# Patient Record
Sex: MALE | Race: WHITE | NOT HISPANIC OR LATINO | Employment: FULL TIME | URBAN - METROPOLITAN AREA
[De-identification: names, ages, dates, MRNs, and addresses within clinical notes are randomized per-mention and may not be internally consistent; named-entity substitution may affect disease eponyms.]

---

## 2017-01-11 ENCOUNTER — LAB VISIT (OUTPATIENT)
Dept: LAB | Facility: HOSPITAL | Age: 57
End: 2017-01-11
Attending: NURSE PRACTITIONER
Payer: COMMERCIAL

## 2017-01-11 DIAGNOSIS — E29.1 HYPOGONADISM IN MALE: ICD-10-CM

## 2017-01-11 PROCEDURE — 84270 ASSAY OF SEX HORMONE GLOBUL: CPT

## 2017-01-11 PROCEDURE — 36415 COLL VENOUS BLD VENIPUNCTURE: CPT

## 2017-01-14 LAB
ALBUMIN SERPL-MCNC: 4.3 G/DL (ref 3.6–5.1)
SHBG SERPL-SCNC: 31 NMOL/L (ref 22–77)
TESTOST FREE SERPL-MCNC: 36.6 PG/ML (ref 46–224)
TESTOST SERPL-MCNC: 273 NG/DL (ref 250–1100)
TESTOSTERONE.FREE+WB SERPL-MCNC: 72 NG/DL (ref 110–575)

## 2017-01-17 ENCOUNTER — OFFICE VISIT (OUTPATIENT)
Dept: UROLOGY | Facility: CLINIC | Age: 57
End: 2017-01-17
Payer: COMMERCIAL

## 2017-01-17 VITALS
HEART RATE: 67 BPM | BODY MASS INDEX: 26.51 KG/M2 | DIASTOLIC BLOOD PRESSURE: 71 MMHG | SYSTOLIC BLOOD PRESSURE: 115 MMHG | HEIGHT: 73 IN | WEIGHT: 200 LBS

## 2017-01-17 DIAGNOSIS — R79.89 LOW SERUM TESTOSTERONE LEVEL: ICD-10-CM

## 2017-01-17 DIAGNOSIS — E29.1 HYPOGONADISM IN MALE: Primary | ICD-10-CM

## 2017-01-17 DIAGNOSIS — R79.89 LOW TESTOSTERONE: ICD-10-CM

## 2017-01-17 LAB
BILIRUB SERPL-MCNC: NORMAL MG/DL
BLOOD URINE, POC: NORMAL
COLOR, POC UA: NORMAL
GLUCOSE UR QL STRIP: NORMAL
KETONES UR QL STRIP: NORMAL
LEUKOCYTE ESTERASE URINE, POC: NORMAL
NITRITE, POC UA: NORMAL
PH, POC UA: 7
PROTEIN, POC: NORMAL
SPECIFIC GRAVITY, POC UA: 1.01
UROBILINOGEN, POC UA: NORMAL

## 2017-01-17 PROCEDURE — 1159F MED LIST DOCD IN RCRD: CPT | Mod: S$GLB,,, | Performed by: NURSE PRACTITIONER

## 2017-01-17 PROCEDURE — 99214 OFFICE O/P EST MOD 30 MIN: CPT | Mod: 25,S$GLB,, | Performed by: NURSE PRACTITIONER

## 2017-01-17 PROCEDURE — 81001 URINALYSIS AUTO W/SCOPE: CPT | Mod: S$GLB,,, | Performed by: NURSE PRACTITIONER

## 2017-01-17 PROCEDURE — 99999 PR PBB SHADOW E&M-EST. PATIENT-LVL III: CPT | Mod: PBBFAC,,, | Performed by: NURSE PRACTITIONER

## 2017-01-17 RX ORDER — TESTOSTERONE CYPIONATE 200 MG/ML
200 INJECTION, SOLUTION INTRAMUSCULAR
Qty: 10 ML | Refills: 0 | Status: SHIPPED | OUTPATIENT
Start: 2017-01-17 | End: 2018-01-29 | Stop reason: ALTCHOICE

## 2017-01-17 NOTE — MR AVS SNAPSHOT
[facility]  [address]  [place] [address]  Phone: [phone]                  Edwin Conklin   2017 9:30 AM   Office Visit    Description:  Male : 1960   Provider:  NORMA Foley   Department:  [facility]           Reason for Visit     Follow-up           Diagnoses this Visit        Comments    Hypogonadism in male    -  Primary     Low serum testosterone level         Low testosterone                To Do List           Goals (5 Years of Data)     None       These Medications        Disp Refills Start End    testosterone cypionate (DEPOTESTOTERONE CYPIONATE) 200 mg/mL injection 10 mL 0 2017     Inject 1 mL (200 mg total) into the muscle every 28 days. - Intramuscular    Pharmacy: [facility] [address] [place] [address]  [facility] Ph #: [phone]         [facility]     [facility] On Call Nurse Care Line -  Assistance  Registered nurses in the [facility] provide clinical advisement, health education, appointment booking, and other advisory services.  Call for this free service at [phone].             Medications           Message regarding Medications     Verify the changes and/or additions to your medication regime listed below are the same as discussed with your clinician today.  If any of these changes or additions are incorrect, please notify your healthcare provider.             Verify that the below list of medications is an accurate representation of the medications you are currently taking.  If none reported, the list may be blank. If incorrect, please contact your healthcare provider. Carry this list with you in case of emergency.           Current Medications     testosterone cypionate (DEPOTESTOTERONE CYPIONATE) 200 mg/mL injection Inject 1 mL (200 mg total) into the muscle every 28 days.           Clinical Reference Information           Vital Signs - Last Recorded  Most  "recent update: 1/17/2017  9:35 AM by Marissa Mclaughlin MA    BP Pulse Ht Wt BMI    115/71 (BP Location: Right arm, Patient Position: Sitting, BP Method: Automatic) 67 6' 1" (1.854 m) 90.7 kg (200 lb) 26.39 kg/m2      Blood Pressure          Most Recent Value    BP  115/71      Allergies as of 1/17/2017     No Known Allergies      Immunizations Administered on Date of Encounter - 1/17/2017     None      Orders Placed During Today's Visit      Normal Orders This Visit    POCT urinalysis, dipstick or tablet reag     Future Labs/Procedures Expected by Expires    Prostate Specific Antigen, Diagnostic  4/17/2017 1/17/2018    Testosterone Panel  4/17/2017 3/18/2018         1/17/2017  9:40 AM - Marissa Mclaughlin MA      Component Results     Component    Color, UA    yellow clear    Spec Grav, UA    1.010    pH, UA    7    WBC, UA    n    Nitrite, UA    n    Protein, UA    n    Glucose, UA    n    Ketones, UA    n    Urobilinogen, UA    n    Bilirubin, UA    n    Blood, UA    n      "

## 2017-01-17 NOTE — PROGRESS NOTES
[facility] Progress Note  Staff: SHERI Briseno    Chief Complaint:   Chief Complaint   Patient presents with    Follow-up     low testosterone      HPI: Edwin Conklin is a 56 y.o. male here for for routine recheck in r/t hypogonadism and testosterone replacement therapy. Pt was last seen by me on 08/15/2016. During last ov we decided to do the replacement injections every other month instead of every month.  Today looking at latest lab results his testosterone has lowered since changing regimen.  Pt states today his erections have decreased as well since going to every other month with the injections as well as some fatigue issues.     Pt also has a hx of BPH but denies any dysuria, urinary frequency, urgency, hematuria or incontinence at this time.    Last Testosterone labs drawn on 2017:        16    Testosterone 250 - 1100 ng/dL [phone]     Last PSA Screenin2016 0.22*  Lab Results   Component Value Date    PSA 0.14 2011    PSA 0.15 2010     Relevant Labs:   UA today:    Color:  Clear, Yellow  Spec. Grav. 1.010  PH  7.0  Negative for leukocytes, nitrites, protein, glucose, ketones, bilirubin, and blood.    Review of Systems   Constitutional: Negative for chills, diaphoresis, fever and weight loss.   HENT: Negative for congestion, hearing loss, nosebleeds and sore throat.    Eyes: Negative for blurred vision and pain.   Respiratory: Negative for cough and wheezing.    Cardiovascular: Negative for chest pain, palpitations and leg swelling.   Gastrointestinal: Negative for abdominal pain, heartburn, nausea and vomiting.   Genitourinary: Negative for dysuria, flank pain, frequency, hematuria and urgency.   Musculoskeletal: Negative for back pain, joint pain, myalgias and neck pain.   Skin: Negative for itching and rash.   Neurological: Negative for dizziness, tremors, sensory change, seizures, loss of consciousness,  weakness and headaches.   Endo/Heme/Allergies: Does not bruise/bleed easily.   Psychiatric/Behavioral: Negative for depression and suicidal ideas. The patient is not nervous/anxious.      Physical Exam   Constitutional: He is oriented to person, place, and time. He appears well-developed and well-nourished.   HENT:   Head: Normocephalic and atraumatic.   Right Ear: External ear normal.   Left Ear: External ear normal.   Nose: Nose normal.   Mouth/Throat: Oropharynx is clear and moist.   Eyes: Conjunctivae and EOM are normal. Pupils are equal, round, and reactive to light.   Neck: Normal range of motion. Neck supple.   Cardiovascular: Normal rate, regular rhythm, normal heart sounds and intact distal pulses.    Pulmonary/Chest: Effort normal and breath sounds normal.   Abdominal: Soft. Bowel sounds are normal.   Musculoskeletal: Normal range of motion.   Neurological: He is alert and oriented to person, place, and time. He has normal reflexes.   Skin: Skin is warm and dry.     Psychiatric: He has a normal mood and affect. His behavior is normal. Judgment and thought content normal.     A) Edwin Conklin is a 56 y.o. male with   1. Hypogonadism in male    2. Low serum testosterone level    3. Low testosterone        Plan)   1. Pt will go back to Testosterone Injections 200 mg-1mL every 28 days and/or monthly.  2. We will re-draw his Testosterone and PSA labs in three months prior to next ov in April.    I have spent 30 minutes with this patient and over 50% of visit was spent counseling with the patient.    Olamide WADE

## 2017-05-01 ENCOUNTER — OFFICE VISIT (OUTPATIENT)
Dept: UROLOGY | Facility: CLINIC | Age: 57
End: 2017-05-01
Payer: COMMERCIAL

## 2017-05-01 VITALS
DIASTOLIC BLOOD PRESSURE: 76 MMHG | HEIGHT: 73 IN | BODY MASS INDEX: 25.98 KG/M2 | TEMPERATURE: 98 F | WEIGHT: 196 LBS | HEART RATE: 74 BPM | SYSTOLIC BLOOD PRESSURE: 126 MMHG

## 2017-05-01 DIAGNOSIS — S39.94XA PENILE TRAUMA, INITIAL ENCOUNTER: ICD-10-CM

## 2017-05-01 PROCEDURE — 99999 PR PBB SHADOW E&M-EST. PATIENT-LVL III: CPT | Mod: PBBFAC,,, | Performed by: UROLOGY

## 2017-05-01 PROCEDURE — 1160F RVW MEDS BY RX/DR IN RCRD: CPT | Mod: S$GLB,,, | Performed by: UROLOGY

## 2017-05-01 PROCEDURE — 81002 URINALYSIS NONAUTO W/O SCOPE: CPT | Mod: S$GLB,,, | Performed by: UROLOGY

## 2017-05-01 PROCEDURE — 99214 OFFICE O/P EST MOD 30 MIN: CPT | Mod: 25,S$GLB,, | Performed by: UROLOGY

## 2017-05-01 NOTE — PROGRESS NOTES
Subjective:       Patient ID: Edwin Conklin is a 56 y.o. male.    Chief Complaint:   OFFICE NOTE    CHIEF COMPLAINT:  Bruising of the genitalia.    Mr. Conklin is a 56-year-old male who is being treated by us for low serum   testosterone.  The patient refers that approximately 8 to 10 days ago, felt a   discomfort at the end of intercourse and the next morning he saw that his penis   was bruised and then the bruising went into the suprapubic area and also in the   scrotum.  Since then, the bruising has been decreasing in size and resolving.    Denies any pain at the present time.  Denies any deformity of the penis and in   general, he feels fine except that the genitalia looks bruised.  This is the   first time that this happens to him.  Denies any difficulty urinating.  Denies   any hematuria.  Denies any significant trauma, but he felt that it was probably   secondary to some mismovement during sexual intercourse at the time of the   ejaculation.  It is to be noted that the last PSA on him is on August 2016,   0.22.  The urinalysis today failed to show evidence of any blood in the urine.    The remaining of the medical and surgical history, current medications, and   allergies were fully reviewed by me during this visit.      EOR/PN  dd: 05/01/2017 16:48:47 (CDT)  td: 05/02/2017 01:26:44 (CDT)  Doc ID   #[id number]  Job ID #[id number]    CC:       HPI  Review of Systems   Constitutional: Negative for activity change and appetite change.   HENT: Negative.    Eyes: Negative for discharge.   Respiratory: Negative for cough and shortness of breath.    Cardiovascular: Negative for chest pain and palpitations.   Gastrointestinal: Negative for abdominal distention, abdominal pain, constipation and vomiting.   Genitourinary: Negative for discharge, dysuria, flank pain, frequency, hematuria, testicular pain and urgency.        Penis and scrotal bruising     Musculoskeletal: Negative for arthralgias.   Skin: Negative for  rash.   Neurological: Negative for dizziness.   Psychiatric/Behavioral: The patient is not nervous/anxious.        Objective:      Physical Exam   Constitutional: He appears well-developed and well-nourished.   HENT:   Head: Normocephalic.   Eyes: Pupils are equal, round, and reactive to light.   Neck: Normal range of motion.   Cardiovascular: Normal rate.    Pulmonary/Chest: Effort normal.   Abdominal: Soft. He exhibits no distension and no mass. There is no tenderness.   Genitourinary: Rectum normal, prostate normal and testes normal. Right testis shows no mass and no tenderness. Left testis shows no mass and no tenderness. Circumcised.         Musculoskeletal: Normal range of motion.   Neurological: He is alert.   Skin: Skin is warm.     Psychiatric: He has a normal mood and affect.       Assessment:       1. Penile trauma, initial encounter        Plan:       Penile trauma, initial encounter  -     POCT URINE DIPSTICK WITHOUT MICROSCOPE    ASA daily, warm bath an the bruising will resolve in time. RTC if needed

## 2017-05-02 LAB
BILIRUB SERPL-MCNC: NORMAL MG/DL
BLOOD URINE, POC: NORMAL
COLOR, POC UA: NORMAL
GLUCOSE UR QL STRIP: NORMAL
KETONES UR QL STRIP: NORMAL
LEUKOCYTE ESTERASE URINE, POC: NORMAL
NITRITE, POC UA: NORMAL
PH, POC UA: 5
PROTEIN, POC: NORMAL
SPECIFIC GRAVITY, POC UA: 1.02
UROBILINOGEN, POC UA: NORMAL

## 2018-01-16 ENCOUNTER — TELEPHONE (OUTPATIENT)
Dept: UROLOGY | Facility: CLINIC | Age: 58
End: 2018-01-16

## 2018-01-16 NOTE — TELEPHONE ENCOUNTER
----- Message from She Ruiz sent at 1/16/2018  2:40 PM CST -----  Pt is asking to have his lab orders sent to [facility] ...[phone] (home)

## 2018-01-29 ENCOUNTER — OFFICE VISIT (OUTPATIENT)
Dept: UROLOGY | Facility: CLINIC | Age: 58
End: 2018-01-29
Payer: COMMERCIAL

## 2018-01-29 VITALS
HEART RATE: 77 BPM | RESPIRATION RATE: 18 BRPM | WEIGHT: 208.13 LBS | SYSTOLIC BLOOD PRESSURE: 108 MMHG | BODY MASS INDEX: 27.59 KG/M2 | DIASTOLIC BLOOD PRESSURE: 69 MMHG | HEIGHT: 73 IN | TEMPERATURE: 98 F

## 2018-01-29 DIAGNOSIS — R79.89 LOW SERUM TESTOSTERONE: Primary | ICD-10-CM

## 2018-01-29 DIAGNOSIS — R79.89 LOW SERUM TESTOSTERONE LEVEL: ICD-10-CM

## 2018-01-29 LAB
BILIRUB SERPL-MCNC: NORMAL MG/DL
BLOOD URINE, POC: NORMAL
COLOR, POC UA: YELLOW
GLUCOSE UR QL STRIP: NORMAL
KETONES UR QL STRIP: NORMAL
LEUKOCYTE ESTERASE URINE, POC: NORMAL
NITRITE, POC UA: NORMAL
PH, POC UA: 6
PROTEIN, POC: NORMAL
SPECIFIC GRAVITY, POC UA: 1.01
UROBILINOGEN, POC UA: NORMAL

## 2018-01-29 PROCEDURE — 99999 PR PBB SHADOW E&M-EST. PATIENT-LVL III: CPT | Mod: PBBFAC,,, | Performed by: UROLOGY

## 2018-01-29 PROCEDURE — 81002 URINALYSIS NONAUTO W/O SCOPE: CPT | Mod: S$GLB,,, | Performed by: UROLOGY

## 2018-01-29 PROCEDURE — 99214 OFFICE O/P EST MOD 30 MIN: CPT | Mod: 25,S$GLB,, | Performed by: UROLOGY

## 2018-01-29 RX ORDER — TESTOSTERONE CYPIONATE 200 MG/ML
200 INJECTION, SOLUTION INTRAMUSCULAR
Qty: 10 ML | Refills: 0 | Status: SHIPPED | OUTPATIENT
Start: 2018-01-29 | End: 2018-11-28 | Stop reason: SDUPTHER

## 2018-01-29 NOTE — PROGRESS NOTES
Patient ID: Edwin Conklin is a 57 y.o. male.    Chief Complaint:   OFFICE NOTE    CHIEF COMPLAINT:  Low serum testosterone.    Mr. Conklin is a 57-year-old male, who has a long history of low serum   testosterone.  The patient is here for his annual evaluation.  He refers that he   stopped giving him the shots on 10/20/2017.  Since then, the patient is having   increased fatigue, low sexual libido, and will like to go back on the   medications.  We did before this visit a complete evaluation on his blood test,   and his PSA was 0.1, the total testosterone was 249, the free testosterone was   36.8, and the testosterone bioavailable is 72.  All these numbers are below the   normal range for the lab.  All these tests were performed on 01/19/2018.    The patient referred to have nocturia x2.  No dysuria.  No hematuria.  The flow   is adequate.  He feels that he empties the bladder satisfactory.    FAMILY HISTORY:  Negative for prostate cancer.    PAST MEDICAL AND PAST SURGICAL HISTORY:  Have not changed since the last visit.    All this was reviewed including medications and allergies.    Urinalysis today is clear.      EOR/HN  dd: 01/29/2018 17:08:20 (CST)  td: 01/30/2018 07:32:20 (CST)  Doc ID   #[id number]  Job ID #[id number]    CC:         HPI  Review of Systems   Constitutional: Positive for activity change and fatigue. Negative for appetite change.   HENT: Negative.    Eyes: Negative for discharge.   Respiratory: Negative for cough and shortness of breath.    Cardiovascular: Negative for chest pain and palpitations.   Gastrointestinal: Negative for abdominal distention, abdominal pain, constipation and vomiting.   Genitourinary: Negative for discharge, dysuria, flank pain, frequency, hematuria, testicular pain and urgency.   Musculoskeletal: Negative for arthralgias.   Skin: Negative for rash.   Neurological: Negative for dizziness.   Psychiatric/Behavioral: The patient is not nervous/anxious.         Objective:      Physical Exam   Constitutional: He appears well-developed and well-nourished.   HENT:   Head: Normocephalic.   Eyes: Pupils are equal, round, and reactive to light.   Neck: Normal range of motion.   Cardiovascular: Normal rate.    Pulmonary/Chest: Effort normal.   Abdominal: Soft. He exhibits no distension and no mass. There is no tenderness. A hernia is present. Hernia confirmed negative in the right inguinal area and confirmed negative in the left inguinal area.   Genitourinary: Rectum normal and penis normal. Rectal exam shows no external hemorrhoid, no mass and no tenderness. Prostate is enlarged. Prostate is not tender. Right testis shows no mass and no tenderness. Left testis shows no mass and no tenderness. No discharge found.   Musculoskeletal: Normal range of motion.   Neurological: He is alert.   Skin: Skin is warm.     Psychiatric: He has a normal mood and affect.       Assessment:       1. Low serum testosterone    2. Low serum testosterone level        Plan:       Low serum testosterone  -     POCT URINE DIPSTICK WITHOUT MICROSCOPE    Low serum testosterone level  -     testosterone cypionate (DEPOTESTOTERONE CYPIONATE) 200 mg/mL injection; Inject 1 mL (200 mg total) into the muscle every 28 days.  Dispense: 10 mL; Refill: 0    Plan to keep the same management. RTC 1 yr

## 2018-11-28 DIAGNOSIS — R79.89 LOW SERUM TESTOSTERONE LEVEL: ICD-10-CM

## 2018-11-28 NOTE — TELEPHONE ENCOUNTER
----- Message from Heather Diaz sent at 11/28/2018  2:12 PM CST -----  Contact: [phone]  Patient requesting a refill on testosterone.    Patient will be using [facility] Phone: [phone].    Please call patient at [phone]. Thanks!

## 2018-11-29 DIAGNOSIS — R79.89 LOW SERUM TESTOSTERONE LEVEL: ICD-10-CM

## 2018-11-29 NOTE — TELEPHONE ENCOUNTER
----- Message from Quinn Sniger sent at 11/29/2018 11:16 AM CST -----  Pt came in the the [facility] and did not know dr. Hernandez was in [facility]. He was trying to get a refill on testosteron.

## 2018-11-29 NOTE — TELEPHONE ENCOUNTER
Informed pt that I sent in his refill request to provider. Dr. Hernandez will be in for clinic tomorrow and I will tell him to look at his refill request. Pt verbalized understanding.

## 2018-12-03 DIAGNOSIS — R79.89 LOW SERUM TESTOSTERONE LEVEL: ICD-10-CM

## 2018-12-03 RX ORDER — TESTOSTERONE CYPIONATE 200 MG/ML
200 INJECTION, SOLUTION INTRAMUSCULAR
Qty: 10 ML | Refills: 0 | Status: SHIPPED | OUTPATIENT
Start: 2018-12-03 | End: 2018-12-04 | Stop reason: SDUPTHER

## 2018-12-04 DIAGNOSIS — R79.89 LOW SERUM TESTOSTERONE LEVEL: ICD-10-CM

## 2018-12-04 NOTE — TELEPHONE ENCOUNTER
----- Message from Bess Johnson sent at 12/4/2018  2:24 PM CST -----  Type:  RX Refill Request    Who Called:Ptient  Refill or New Rx:  refill  RX Name and Strength:  testosterone cypionate (DEPOTESTOTERONE CYPIONATE) 200 mg/mL injection  How is the patient currently taking it? (ex. 1XDay):  Na  Is this a 30 day or 90 day RX:  30  Preferred Pharmacy with phone number:    [facility] [address] [place], [place] [address] [facility]  [address] [facility] [address]  Phone: [phone] Fax: [phone]  Local or Mail Order: local  Ordering Provider:  David Lock Call Back Number:  [phone] (home)     Additional Information:  Carly

## 2018-12-05 RX ORDER — TESTOSTERONE CYPIONATE 200 MG/ML
INJECTION, SOLUTION INTRAMUSCULAR
Qty: 10 ML | Refills: 0 | OUTPATIENT
Start: 2018-12-05

## 2018-12-05 NOTE — TELEPHONE ENCOUNTER
----- Message from Vinny Bishop sent at 12/5/2018 10:15 AM CST -----  Contact: self   Please need for rx testosterone cypionate (DEPOTESTOTERONE CYPIONATE) 200 mg/mL injection to be sent to pharmacy. Please call back at [phone] (home) asap     [id number] - [place] - [address] [facility] [address]  [address] [place] [address]  Phone: [phone] Fax: [phone]

## 2018-12-06 ENCOUNTER — TELEPHONE (OUTPATIENT)
Dept: UROLOGY | Facility: CLINIC | Age: 58
End: 2018-12-06

## 2018-12-06 RX ORDER — TESTOSTERONE CYPIONATE 200 MG/ML
200 INJECTION, SOLUTION INTRAMUSCULAR
Qty: 10 ML | Refills: 0 | Status: SHIPPED | OUTPATIENT
Start: 2018-12-06 | End: 2019-02-18 | Stop reason: SDUPTHER

## 2018-12-06 NOTE — TELEPHONE ENCOUNTER
"----- Message from Tera Ellis sent at 12/6/2018  9:41 AM CST -----  Type: Needs Medical Advice    Who Called:  Bc Lock Call Back Number: [phone]  Additional Information: Edwin Conklin is on the line kind of "ticked" off about a prescription - please call regardless today    "

## 2018-12-06 NOTE — TELEPHONE ENCOUNTER
Informed pt provider is send another RX in due to the other one not transcribing to pharmacy correctly. Pt verbalized understanding.

## 2019-02-06 ENCOUNTER — TELEPHONE (OUTPATIENT)
Dept: UROLOGY | Facility: CLINIC | Age: 59
End: 2019-02-06

## 2019-02-06 DIAGNOSIS — R79.89 LOW TESTOSTERONE: Primary | ICD-10-CM

## 2019-02-06 NOTE — TELEPHONE ENCOUNTER
----- Message from Ellis Javier sent at 2/6/2019 11:43 AM CST -----  Contact: pt  Pt is requesting orders to be put in    Orders Requested: testosterone levels to be checked  Reason for the orders: always gets this done prior to horacio   Additional Information: previous pt of David    Please call pt to inform them the orders have been entered so that they are able to call and schedule.     Call Back #: [phone]  Thanks

## 2019-02-06 NOTE — TELEPHONE ENCOUNTER
----- Message from Carolina Dior sent at 2/6/2019  4:30 PM CST -----  Type:  Patient Returning Call    Who Called: Patient  Who Left Message for Patient:  n/a  Does the patient know what this is regarding?:  no  Best Call Back Number:  [phone]  Additional Information:  Accidentally told nurse that she had the wrong number because he did not think it was him office was asking for. Please call back

## 2019-02-06 NOTE — TELEPHONE ENCOUNTER
Contacted pt to inform him of orders put in for him to have lab work done. Pt was advised to fast before labs are done no later than 9:30am and 3 days before next injection. Patient voiced understanding.

## 2019-02-12 ENCOUNTER — LAB VISIT (OUTPATIENT)
Dept: LAB | Facility: HOSPITAL | Age: 59
End: 2019-02-12
Attending: NURSE PRACTITIONER
Payer: COMMERCIAL

## 2019-02-12 DIAGNOSIS — R79.89 LOW TESTOSTERONE: ICD-10-CM

## 2019-02-12 LAB
ANION GAP SERPL CALC-SCNC: 9 MMOL/L
BUN SERPL-MCNC: 12 MG/DL
CALCIUM SERPL-MCNC: 9.3 MG/DL
CHLORIDE SERPL-SCNC: 106 MMOL/L
CO2 SERPL-SCNC: 24 MMOL/L
COMPLEXED PSA SERPL-MCNC: 0.12 NG/ML
CREAT SERPL-MCNC: 1.1 MG/DL
ERYTHROCYTE [DISTWIDTH] IN BLOOD BY AUTOMATED COUNT: 14.8 %
EST. GFR  (AFRICAN AMERICAN): >60 ML/MIN/1.73 M^2
EST. GFR  (NON AFRICAN AMERICAN): >60 ML/MIN/1.73 M^2
GLUCOSE SERPL-MCNC: 87 MG/DL
HCT VFR BLD AUTO: 47.3 %
HGB BLD-MCNC: 15.5 G/DL
MCH RBC QN AUTO: 29.8 PG
MCHC RBC AUTO-ENTMCNC: 32.7 G/DL
MCV RBC AUTO: 91 FL
PLATELET # BLD AUTO: 205 K/UL
PMV BLD AUTO: 7.6 FL
POTASSIUM SERPL-SCNC: 3.7 MMOL/L
RBC # BLD AUTO: 5.2 M/UL
SODIUM SERPL-SCNC: 139 MMOL/L
TESTOST SERPL-MCNC: 664 NG/DL
WBC # BLD AUTO: 6.8 K/UL

## 2019-02-12 PROCEDURE — 84403 ASSAY OF TOTAL TESTOSTERONE: CPT

## 2019-02-12 PROCEDURE — 80048 BASIC METABOLIC PNL TOTAL CA: CPT

## 2019-02-12 PROCEDURE — 36415 COLL VENOUS BLD VENIPUNCTURE: CPT

## 2019-02-12 PROCEDURE — 84153 ASSAY OF PSA TOTAL: CPT

## 2019-02-12 PROCEDURE — 85027 COMPLETE CBC AUTOMATED: CPT

## 2019-02-15 NOTE — PROGRESS NOTES
[facility] Note  Staff: SHERI Briseno    PCP: N/A    Chief Complaint: Follow-up: Hypogonadism    Subjective:        HPI: Edwin Conklin is a 58 y.o. male presents today for his annual exam.    The pt was last seen by Dr. Hernandez on 01/30/2018.    Mr. Conklin is a 57-year-old male, who has a long history of low serum   testosterone.  The patient is here for his annual evaluation.  Pt is doing well with no complaints voiced as of today's office visit.   The patient referred to have nocturia x2.  No dysuria.  No hematuria.  The flow   is adequate.  He feels that he empties the bladder satisfactory.     FAMILY HISTORY:  Negative for prostate cancer.     PAST MEDICAL AND PAST SURGICAL HISTORY:  Have not changed since the last visit.    All this was reviewed including medications and allergies.    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    Medications: reviewed   Objective:     Vitals:    02/18/19 0821   BP: 115/72   Pulse: 73   Resp: 18   Temp: 97.5 °F (36.4 °C)     General:WDWN in NAD  Eyes: PERRLA, normal conjunctiva  Respiratory: no increased work on breathing, clear to auscultation  Cardiovascular: regular rate and rhythm. No obvious extremity edema.  GI: palpation of masses. No  tenderness. No hepatosplenomegaly to palpation.  Musculoskeletal: normal range of motion of bilateral upper extremities. Normal muscle strength and tone.  Skin: no obvious rashes or lesions. No tightening of skin noted.  Neurologic: CN grossly normal. Normal sensation.   Psychiatric: awake, alert and oriented x 3. Mood and affect normal. Cooperative.    :  Inspection of anus and perineum normal  No scrotal rashes, cysts or lesions  Epididymis normal in size, no tenderness  Testes normal and size, equal size bilaterally, no masses  Urethral meatus normal without discharge  YNES: 25-30g prostate gland without masses, tenderness. SV not palpable. Normal sphincter tone. No hemhorroids.    LABS REVIEW:  UA today:  Color:Clear, Yellow  Spec. Grav.  1.020  PH  6.0  Trace of Blood    Cr:   Lab Results   Component Value Date    CREATININE 1.1 02/12/2019     Assessment:       1. Hypogonadism in male    2. Low serum testosterone level          Plan:     1.  Testosterone cypionate 200 mg/mL-Inject 1 mL every 28 days refilled for pt today.    F/u with pt in one year with lab work done prior to office visit.    [facility]: Inactive    [place] Larios, NORMA-C

## 2019-02-18 ENCOUNTER — OFFICE VISIT (OUTPATIENT)
Dept: UROLOGY | Facility: CLINIC | Age: 59
End: 2019-02-18
Payer: COMMERCIAL

## 2019-02-18 VITALS
BODY MASS INDEX: 28.11 KG/M2 | DIASTOLIC BLOOD PRESSURE: 72 MMHG | SYSTOLIC BLOOD PRESSURE: 115 MMHG | HEIGHT: 73 IN | TEMPERATURE: 98 F | RESPIRATION RATE: 18 BRPM | WEIGHT: 212.06 LBS | HEART RATE: 73 BPM

## 2019-02-18 DIAGNOSIS — R79.89 LOW SERUM TESTOSTERONE LEVEL: ICD-10-CM

## 2019-02-18 DIAGNOSIS — E29.1 HYPOGONADISM IN MALE: Primary | ICD-10-CM

## 2019-02-18 LAB
BILIRUB SERPL-MCNC: ABNORMAL MG/DL
BLOOD URINE, POC: ABNORMAL
COLOR, POC UA: YELLOW
GLUCOSE UR QL STRIP: ABNORMAL
KETONES UR QL STRIP: ABNORMAL
LEUKOCYTE ESTERASE URINE, POC: ABNORMAL
NITRITE, POC UA: ABNORMAL
PH, POC UA: 6
PROTEIN, POC: ABNORMAL
SPECIFIC GRAVITY, POC UA: 1.02
UROBILINOGEN, POC UA: 0.2

## 2019-02-18 PROCEDURE — 3008F PR BODY MASS INDEX (BMI) DOCUMENTED: ICD-10-PCS | Mod: CPTII,S$GLB,, | Performed by: NURSE PRACTITIONER

## 2019-02-18 PROCEDURE — 3008F BODY MASS INDEX DOCD: CPT | Mod: CPTII,S$GLB,, | Performed by: NURSE PRACTITIONER

## 2019-02-18 PROCEDURE — 99999 PR PBB SHADOW E&M-EST. PATIENT-LVL IV: ICD-10-PCS | Mod: PBBFAC,,, | Performed by: NURSE PRACTITIONER

## 2019-02-18 PROCEDURE — 99214 PR OFFICE/OUTPT VISIT, EST, LEVL IV, 30-39 MIN: ICD-10-PCS | Mod: 25,S$GLB,, | Performed by: NURSE PRACTITIONER

## 2019-02-18 PROCEDURE — 99999 PR PBB SHADOW E&M-EST. PATIENT-LVL IV: CPT | Mod: PBBFAC,,, | Performed by: NURSE PRACTITIONER

## 2019-02-18 PROCEDURE — 81002 POCT URINE DIPSTICK WITHOUT MICROSCOPE: ICD-10-PCS | Mod: S$GLB,,, | Performed by: NURSE PRACTITIONER

## 2019-02-18 PROCEDURE — 99214 OFFICE O/P EST MOD 30 MIN: CPT | Mod: 25,S$GLB,, | Performed by: NURSE PRACTITIONER

## 2019-02-18 PROCEDURE — 81002 URINALYSIS NONAUTO W/O SCOPE: CPT | Mod: S$GLB,,, | Performed by: NURSE PRACTITIONER

## 2019-02-18 RX ORDER — TESTOSTERONE CYPIONATE 200 MG/ML
200 INJECTION, SOLUTION INTRAMUSCULAR
Qty: 10 ML | Refills: 3 | Status: SHIPPED | OUTPATIENT
Start: 2019-02-18 | End: 2019-09-23 | Stop reason: SDUPTHER

## 2019-02-18 NOTE — PATIENT INSTRUCTIONS
Testosterone injection  What is this medicine?  TESTOSTERONE (esteban TOS ter one) is the main male hormone. It supports normal male development such as muscle growth, facial hair, and deep voice. It is used in males to treat low testosterone levels.  How should I use this medicine?  This medicine is for injection into a muscle. It is usually given by a health care professional in a hospital or clinic setting.  Contact your pediatrician regarding the use of this medicine in children. While this medicine may be prescribed for children as young as 12 years of age for selected conditions, precautions do apply.  What side effects may I notice from receiving this medicine?  Side effects that you should report to your doctor or health care professional as soon as possible:  · allergic reactions like skin rash, itching or hives, swelling of the face, lips, or tongue  · breast enlargement  · breathing problems  · changes in mood, especially anger, depression, or rage  · dark urine  · general ill feeling or flu-like symptoms  · light-colored stools  · loss of appetite, nausea  · nausea, vomiting  · right upper belly pain  · stomach pain  · swelling of ankles  · too frequent or persistent erections  · trouble passing urine or change in the amount of urine  · unusually weak or tired  · yellowing of the eyes or skin  Additional side effects that can occur in women include:  · deep or hoarse voice  · facial hair growth  · irregular menstrual periods  Side effects that usually do not require medical attention (report to your doctor or health care professional if they continue or are bothersome):  · acne  · change in sex drive or performance  · hair loss  · headache  What may interact with this medicine?  · medicines for diabetes  · medicines that treat or prevent blood clots like warfarin  · oxyphenbutazone  · propranolol  · steroid medicines like prednisone or cortisone  What if I miss a dose?  Try not to miss a dose. Your doctor  or health care professional will tell you when your next injection is due. Notify the office if you are unable to keep an appointment.  Where should I keep my medicine?  Keep out of the reach of children. This medicine can be abused. Keep your medicine in a safe place to protect it from theft. Do not share this medicine with anyone. Selling or giving away this medicine is dangerous and against the law.  Store at room temperature between 20 and 25 degrees C (68 and 77 degrees F). Do not freeze. Protect from light. Follow the directions for the product you are prescribed. Throw away any unused medicine after the expiration date.  What should I tell my health care provider before I take this medicine?  They need to know if you have any of these conditions:  · breast cancer  · diabetes  · heart disease  · kidney disease  · liver disease  · lung disease  · prostate cancer, enlargement  · an unusual or allergic reaction to testosterone, other medicines, foods, dyes, or preservatives  · pregnant or trying to get pregnant  · breast-feeding  What should I watch for while using this medicine?  Visit your doctor or health care professional for regular checks on your progress. They will need to check the level of testosterone in your blood.  This medicine is only approved for use in men who have low levels of testosterone related to certain medical conditions. Heart attacks and strokes have been reported with the use of this medicine. Notify your doctor or health care professional and seek emergency treatment if you develop breathing problems; changes in vision; confusion; chest pain or chest tightness; sudden arm pain; severe, sudden headache; trouble speaking or understanding; sudden numbness or weakness of the face, arm or leg; loss of balance or coordination. Talk to your doctor about the risks and benefits of this medicine.  This medicine may affect blood sugar levels. If you have diabetes, check with your doctor or health  care professional before you change your diet or the dose of your diabetic medicine.  This drug is banned from use in athletes by most athletic organizations.  NOTE:This sheet is a summary. It may not cover all possible information. If you have questions about this medicine, talk to your doctor, pharmacist, or health care provider. Copyright© 2017 [employer]

## 2019-05-09 ENCOUNTER — OFFICE VISIT (OUTPATIENT)
Dept: URGENT CARE | Facility: CLINIC | Age: 59
End: 2019-05-09
Payer: COMMERCIAL

## 2019-05-09 VITALS
HEIGHT: 73 IN | TEMPERATURE: 98 F | WEIGHT: 212 LBS | OXYGEN SATURATION: 100 % | BODY MASS INDEX: 28.1 KG/M2 | HEART RATE: 82 BPM | RESPIRATION RATE: 18 BRPM

## 2019-05-09 DIAGNOSIS — T14.8XXA ABRASION OF SKIN: Primary | ICD-10-CM

## 2019-05-09 DIAGNOSIS — S69.92XA HAND INJURY, LEFT, INITIAL ENCOUNTER: ICD-10-CM

## 2019-05-09 PROCEDURE — 99203 PR OFFICE/OUTPT VISIT, NEW, LEVL III, 30-44 MIN: ICD-10-PCS | Mod: S$GLB,,, | Performed by: PHYSICIAN ASSISTANT

## 2019-05-09 PROCEDURE — 99203 OFFICE O/P NEW LOW 30 MIN: CPT | Mod: S$GLB,,, | Performed by: PHYSICIAN ASSISTANT

## 2019-05-09 PROCEDURE — 3008F PR BODY MASS INDEX (BMI) DOCUMENTED: ICD-10-PCS | Mod: CPTII,S$GLB,, | Performed by: PHYSICIAN ASSISTANT

## 2019-05-09 PROCEDURE — 3008F BODY MASS INDEX DOCD: CPT | Mod: CPTII,S$GLB,, | Performed by: PHYSICIAN ASSISTANT

## 2019-05-09 RX ORDER — MUPIROCIN 20 MG/G
OINTMENT TOPICAL
Qty: 22 G | Refills: 0 | Status: SHIPPED | OUTPATIENT
Start: 2019-05-09 | End: 2019-08-07

## 2019-05-09 NOTE — PROGRESS NOTES
"Subjective:       Patient ID: Edwin Conklin is a 58 y.o. male.    Vitals:  height is 6' 1" (1.854 m) and weight is 96.2 kg (212 lb). His temperature is 98 °F (36.7 °C). His pulse is 82. His respiration is 18 and oxygen saturation is 100%.     Chief Complaint: Hand Injury (lt hand inj)    Lt hand abrasion secondary using a pressure hose this am proximal to left thumb. Denies E/E/E. TETANUS UTD    Hand Injury    His dominant hand is their left hand. The incident occurred 6 to 12 hours ago. The incident occurred at home. The injury mechanism was a direct blow. The pain is present in the left hand. Quality: throbbing. The pain does not radiate. The pain is at a severity of 2/10. The pain is mild. The pain has been constant since the incident. Pertinent negatives include no chest pain, muscle weakness, numbness or tingling. Nothing aggravates the symptoms. He has tried nothing for the symptoms.       Constitution: Negative for fatigue.   HENT: Negative for facial swelling and facial trauma.    Neck: Negative for neck stiffness.   Cardiovascular: Negative for chest trauma and chest pain.   Eyes: Negative for eye trauma, double vision and blurred vision.   Gastrointestinal: Negative for abdominal trauma, abdominal pain and rectal bleeding.   Genitourinary: Negative for hematuria, genital trauma and pelvic pain.   Musculoskeletal: Positive for pain and trauma. Negative for joint swelling, abnormal ROM of joint and pain with walking.   Skin: Negative for color change, wound, abrasion, laceration and erythema.   Neurological: Negative for dizziness, history of vertigo, light-headedness, coordination disturbances, altered mental status, loss of consciousness and numbness.   Hematologic/Lymphatic: Negative for history of bleeding disorder.   Psychiatric/Behavioral: Negative for altered mental status.       Objective:      Physical Exam   Constitutional: He is oriented to person, place, and time. He appears well-developed " and well-nourished. He is cooperative.  Non-toxic appearance. He does not appear ill. No distress.   HENT:   Head: Normocephalic and atraumatic. Head is without abrasion, without contusion and without laceration.   Right Ear: Hearing, tympanic membrane, external ear and ear canal normal.   Left Ear: Hearing, tympanic membrane, external ear and ear canal normal.   Nose: Nose normal. No mucosal edema, rhinorrhea or nasal deformity. No epistaxis. Right sinus exhibits no maxillary sinus tenderness and no frontal sinus tenderness. Left sinus exhibits no maxillary sinus tenderness and no frontal sinus tenderness.   Mouth/Throat: Uvula is midline, oropharynx is clear and moist and mucous membranes are normal. No trismus in the jaw. Normal dentition. No uvula swelling. No posterior oropharyngeal erythema.   Eyes: Pupils are equal, round, and reactive to light. Conjunctivae, EOM and lids are normal. Right eye exhibits no discharge. Left eye exhibits no discharge. No scleral icterus.   Sclera clear bilat   Neck: Trachea normal, normal range of motion, full passive range of motion without pain and phonation normal. Neck supple.   Cardiovascular: Normal rate, regular rhythm, normal heart sounds, intact distal pulses and normal pulses.   Pulmonary/Chest: Effort normal and breath sounds normal. No stridor. No respiratory distress.   Abdominal: Soft. Normal appearance and bowel sounds are normal. He exhibits no distension, no pulsatile midline mass and no mass. There is no tenderness.   Musculoskeletal: Normal range of motion. He exhibits no edema or deformity.   Neurological: He is alert and oriented to person, place, and time. He exhibits normal muscle tone. Coordination normal.   Skin: Skin is warm and dry. Capillary refill takes less than 2 seconds. Abrasion noted. No bruising, no burn, no ecchymosis, no laceration, no lesion and no rash noted. He is not diaphoretic. No erythema. No pallor.        Psychiatric: He has a normal  mood and affect. His speech is normal and behavior is normal. Judgment and thought content normal. Cognition and memory are normal.   Nursing note and vitals reviewed.      Assessment:       1. Abrasion of skin    2. Hand injury, left, initial encounter        Plan:         Abrasion of skin  -     mupirocin (BACTROBAN) 2 % ointment; Apply to affected area 3 times daily  Dispense: 22 g; Refill: 0    Hand injury, left, initial encounter     Discussed with patient that due to pressure injury we get concerned for swelling edema or further soft tissue damage.  Discussed in the area which he had just hit this superficial skin layer therefore further injury unlikely although specific signs and symptoms to watch out for were discussed.  Verbalized understanding and all his questions were answered      Abrasions  Abrasions are skin scrapes. Their treatment depends on how large and deep the abrasion is.  Home care  You may be prescribed an antibiotic cream or ointment to apply to the wound. This helps prevent infection. Follow instructions when using this medicine.  General care  · To care for the abrasion, do the following each day for as long as directed by your healthcare provider.  ¨ If you were given a bandage, change it once a day. If your bandage sticks to the wound, soak it in warm water until it loosens.  ¨ Wash the area with soap and warm water. You may do this in a sink or under a tub faucet or shower. Rinse off the soap. Then pat the area dry with a clean towel.  ¨ If antibiotic ointment or cream was prescribed, reapply it to the wound as directed. Cover the wound with a fresh nonstick bandage. If the bandage becomes wet or dirty, change it as soon as possible.  ¨ Some antibiotic ointments or cream can cause an allergic reaction or dermatitis. This may cause redness, itching and or hives. If this occurs, stop using the ointment immediately and wash off any remaining ointment. You may need to take some allergy  medicine to relieve symptoms.  · You may use acetaminophen or ibuprofen to control pain unless another pain medicine was prescribed. Talk with your healthcare provider before using these medicines if you have chronic liver or kidney disease or ever had a stomach ulcer or GI bleeding. Dont use ibuprofen in children younger than six months old.  · Most skin wounds heal within 10 days. But an infection may occur even with treatment. So its important to watch the wound for signs of infection as listed below.  Follow-up care  Follow up with your healthcare provider, or as advised.  When to seek medical advice  Call your healthcare provider right away if any of these occur:  · Fever of 100.4ºF (38ºC) or higher, or as directed by your healthcare provider  · Increasing pain, redness, swelling, or drainage from the wound  · Bleeding from the wound that does not stop after a few minutes of steady, firm pressure  · Decreased ability to move any body part near the wound  Date Last Reviewed: 3/3/2017  © 8754-7661 [employer]. [address] [place] [address], [place] [address]. All rights reserved. This information is not intended as a substitute for professional medical care. Always follow your healthcare professional's instructions.      RICE therapy discussed.    Please follow up with your Primary care provider within 2-5 days if your signs and symptoms have not resolved or worsen.     If your condition worsens or fails to improve we recommend that you receive another evaluation at the emergency room immediately or contact your primary medical clinic to discuss your concerns.   You must understand that you have received an Urgent Care treatment only and that you may be released before all of your medical problems are known or treated. You, the patient, will arrange for follow up care as instructed.     RED FLAGS/WARNING SYMPTOMS DISCUSSED WITH PATIENT THAT WOULD WARRANT EMERGENT MEDICAL ATTENTION. PATIENT VERBALIZED  UNDERSTANDING.

## 2019-05-09 NOTE — PATIENT INSTRUCTIONS
Abrasions  Abrasions are skin scrapes. Their treatment depends on how large and deep the abrasion is.  Home care  You may be prescribed an antibiotic cream or ointment to apply to the wound. This helps prevent infection. Follow instructions when using this medicine.  General care  · To care for the abrasion, do the following each day for as long as directed by your healthcare provider.  ¨ If you were given a bandage, change it once a day. If your bandage sticks to the wound, soak it in warm water until it loosens.  ¨ Wash the area with soap and warm water. You may do this in a sink or under a tub faucet or shower. Rinse off the soap. Then pat the area dry with a clean towel.  ¨ If antibiotic ointment or cream was prescribed, reapply it to the wound as directed. Cover the wound with a fresh nonstick bandage. If the bandage becomes wet or dirty, change it as soon as possible.  ¨ Some antibiotic ointments or cream can cause an allergic reaction or dermatitis. This may cause redness, itching and or hives. If this occurs, stop using the ointment immediately and wash off any remaining ointment. You may need to take some allergy medicine to relieve symptoms.  · You may use acetaminophen or ibuprofen to control pain unless another pain medicine was prescribed. Talk with your healthcare provider before using these medicines if you have chronic liver or kidney disease or ever had a stomach ulcer or GI bleeding. Dont use ibuprofen in children younger than six months old.  · Most skin wounds heal within 10 days. But an infection may occur even with treatment. So its important to watch the wound for signs of infection as listed below.  Follow-up care  Follow up with your healthcare provider, or as advised.  When to seek medical advice  Call your healthcare provider right away if any of these occur:  · Fever of 100.4ºF (38ºC) or higher, or as directed by your healthcare provider  · Increasing pain, redness, swelling, or  drainage from the wound  · Bleeding from the wound that does not stop after a few minutes of steady, firm pressure  · Decreased ability to move any body part near the wound  Date Last Reviewed: 3/3/2017  © 0914-7600 [employer]. [address] [place] [address], [place] [address]. All rights reserved. This information is not intended as a substitute for professional medical care. Always follow your healthcare professional's instructions.      RICE therapy discussed.    Please follow up with your Primary care provider within 2-5 days if your signs and symptoms have not resolved or worsen.     If your condition worsens or fails to improve we recommend that you receive another evaluation at the emergency room immediately or contact your primary medical clinic to discuss your concerns.   You must understand that you have received an Urgent Care treatment only and that you may be released before all of your medical problems are known or treated. You, the patient, will arrange for follow up care as instructed.     RED FLAGS/WARNING SYMPTOMS DISCUSSED WITH PATIENT THAT WOULD WARRANT EMERGENT MEDICAL ATTENTION. PATIENT VERBALIZED UNDERSTANDING.

## 2019-08-07 ENCOUNTER — OFFICE VISIT (OUTPATIENT)
Dept: FAMILY MEDICINE | Facility: CLINIC | Age: 59
End: 2019-08-07
Payer: COMMERCIAL

## 2019-08-07 VITALS
BODY MASS INDEX: 27.7 KG/M2 | DIASTOLIC BLOOD PRESSURE: 69 MMHG | HEART RATE: 78 BPM | WEIGHT: 209 LBS | SYSTOLIC BLOOD PRESSURE: 104 MMHG | HEIGHT: 73 IN

## 2019-08-07 DIAGNOSIS — Z11.59 NEED FOR HEPATITIS C SCREENING TEST: ICD-10-CM

## 2019-08-07 DIAGNOSIS — Z00.00 ANNUAL PHYSICAL EXAM: Primary | ICD-10-CM

## 2019-08-07 PROCEDURE — 99999 PR PBB SHADOW E&M-NEW PATIENT-LVL III: CPT | Mod: PBBFAC,,, | Performed by: INTERNAL MEDICINE

## 2019-08-07 PROCEDURE — 99396 PR PREVENTIVE VISIT,EST,40-64: ICD-10-PCS | Mod: S$GLB,,, | Performed by: INTERNAL MEDICINE

## 2019-08-07 PROCEDURE — 99396 PREV VISIT EST AGE 40-64: CPT | Mod: S$GLB,,, | Performed by: INTERNAL MEDICINE

## 2019-08-07 PROCEDURE — 99999 PR PBB SHADOW E&M-NEW PATIENT-LVL III: ICD-10-PCS | Mod: PBBFAC,,, | Performed by: INTERNAL MEDICINE

## 2019-08-07 NOTE — PATIENT INSTRUCTIONS

## 2019-08-07 NOTE — PROGRESS NOTES
Subjective:       Patient ID: Edwin Conklin is a 59 y.o. male.    Chief Complaint: Establish Care and Annual Exam    Mr. Edwin Conklin is a 59-year-old  gentleman who comes to establish with me as a new patient.  He is also here for annual physical.  Thus far he has not been diagnosed with any major medical issues except for low testosterone and he sees Dr. Hernandez/Ms. Olamide Deluca for the same.    He had a lipid panel done approximately in 2010 which was unremarkable.  Thus far he has a normal blood sugar.    He does not smoke cigarettes.  He has briefly smoked during his teenage years.  No illicit substance abuse.  Alcohol use is occasional.    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  He also works as a part-time night  at [employer] in [place].    He is happily  and is blessed with 2 daughters.  He plays golf as a hobby.    He drive safely.    He is updated on tetanus diphtheria vaccination.  He had a colonoscopy in 2012.    He has been also recommended shingles vaccine given that he is 59 now and also annual influenza vaccination.      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      Social History     Socioeconomic History    Marital status:      Spouse name: Theresa Lucio    Number of children: Not on file    Years of education: Not on file    Highest education level: Not on file   Occupational History    Occupation: Salesman     Comment: [employer]   Social Needs    Financial resource strain: Not on file    Food insecurity:     Worry: Not on file     Inability: Not on file    Transportation needs:     Medical: Not on file     Non-medical: Not on file   Tobacco Use    Smoking status: Former Smoker      Packs/day: 0.25     Years: 0.25     Pack years: 0.06     Types: Cigarettes     Start date: 1974     Last attempt to quit: 1975     Years since quittin.6    Smokeless tobacco: Never Used   Substance and Sexual Activity    Alcohol use: Yes     Comment: 3x's week-beer or crown    Drug use: Never    Sexual activity: Yes     Partners: Female   Lifestyle    Physical activity:     Days per week: Not on file     Minutes per session: Not on file    Stress: Not at all   Relationships    Social connections:     Talks on phone: Not on file     Gets together: Not on file     Attends Anabaptist service: Not on file     Active member of club or organization: Not on file     Attends meetings of clubs or organizations: Not on file     Relationship status: Not on file   Other Topics Concern    Not on file   Social History Narrative    Annamaria Daughter     Past Surgical History:   Procedure Laterality Date    EYE SURGERY      ROTATOR CUFF REPAIR      left shoulder    VASECTOMY       Family History   Problem Relation Age of Onset    Alzheimer's disease Father     Emphysema Sister        Review of Systems   Constitutional: Negative for activity change, chills, diaphoresis, fatigue and fever.   HENT: Negative for congestion, facial swelling, nosebleeds, postnasal drip, sore throat and trouble swallowing.    Eyes: Negative for pain, discharge and visual disturbance.   Respiratory: Positive for apnea (CPAP - .Dr Abernathysey). Negative for cough, chest tightness and wheezing.    Cardiovascular: Negative for chest pain, palpitations and leg swelling.        Small Cyst rt breast /Chest   Gastrointestinal: Negative for abdominal distention, abdominal pain, blood in stool and diarrhea.   Endocrine: Negative for cold intolerance, heat intolerance, polydipsia, polyphagia and polyuria.   Genitourinary: Negative for dysuria, flank pain, frequency (nocturia X 1 ), hematuria, scrotal swelling and urgency.        Low T levels  "sees Dr Hernandez and Olamide Deluca   Musculoskeletal: Positive for back pain. Negative for arthralgias and joint swelling.        Back spasm   Skin: Positive for rash (in arm and wrist on and off). Negative for color change and pallor.   Allergic/Immunologic: Negative for environmental allergies, food allergies and immunocompromised state.   Neurological: Negative for dizziness, tremors, seizures, syncope, speech difficulty, light-headedness and numbness.   Hematological: Negative for adenopathy. Does not bruise/bleed easily.   Psychiatric/Behavioral: Negative for agitation, behavioral problems, dysphoric mood and sleep disturbance (sleep apnea). The patient is not nervous/anxious.          Objective:      Blood pressure 104/69, pulse 78, height 6' 1" (1.854 m), weight 94.8 kg (209 lb). Body mass index is 27.57 kg/m².  Physical Exam   Constitutional: He is oriented to person, place, and time. He appears well-developed.   HENT:   Head: Normocephalic and atraumatic.   Nose: Nose normal.   Mouth/Throat: Oropharynx is clear and moist. No oropharyngeal exudate.   Eyes: Conjunctivae and EOM are normal.   Neck: Normal range of motion. Neck supple. No JVD present. No tracheal deviation present. No thyromegaly present.   Cardiovascular: Normal rate, regular rhythm and normal heart sounds. Exam reveals no gallop and no friction rub.   No murmur heard.  Pulmonary/Chest: Effort normal and breath sounds normal. No respiratory distress. He has no wheezes. He has no rales. He exhibits no tenderness, no crepitus, no edema and no swelling. Right breast exhibits no mass. Left breast exhibits no mass.       Abdominal: Soft. Bowel sounds are normal. He exhibits no distension. There is no tenderness.   Musculoskeletal: Normal range of motion.   Tight hamstrings bilaterally   Neurological: He is alert and oriented to person, place, and time. He has normal reflexes.   Skin: Skin is warm and dry.   Psychiatric: He has a normal mood and " affect.   Nursing note and vitals reviewed.        Assessment:       1. Annual physical exam           No visits with results within 3 Month(s) from this visit.   Latest known visit with results is:   Office Visit on 02/18/2019   Component Date Value Ref Range Status    Color, UA 02/18/2019 Yellow   Final    Spec Grav UA 02/18/2019 1.020   Final    pH, UA 02/18/2019 6   Final    WBC, UA 02/18/2019 neg   Final    Nitrite, UA 02/18/2019 neg   Final    Protein 02/18/2019 neg   Final    Glucose, UA 02/18/2019 neg   Final    Ketones, UA 02/18/2019 neg   Final    Urobilinogen, UA 02/18/2019 0.2   Final    Bilirubin 02/18/2019 neg   Final    Blood, UA 02/18/2019 trace   Final         Plan:           Annual physical exam      Patient generally presents with a good physical examination.  Blood pressures are okay.  Clinical examination is okay.    He has a small benign-appearing cyst on the right chest wall below the right breast.  No breast abnormality detected.  This can be simply observed.  No intervention needed now.    Patient does experience some itchy rash on and off in the left arm.  I have advised him to take pictures of it.  He can apply some Benadryl cream or over-the-counter steroid cream for relief.  Try to avoid excess sweating.    I will check a lipid panel and a fasting blood glucose.  I will notify him of the results.    I have advised him to continue to watch his diet and incorporate more greens and vegetables.  Avoid fatty and fried food.  Exercise and stretching to keep his hamstring supple and pliant.    I have also recommended him to consider shingles vaccine as well as influenza vaccine but at this point declines.    If all goes good, I will see him back in 1 year time for repeat annual physical and in the interim if there is any problem he can see me earlier.    I would like to request a copy of his colonoscopy reports also.  This was done by Dr. Barraza several years back.      Current  Outpatient Medications:     testosterone cypionate (DEPOTESTOTERONE CYPIONATE) 200 mg/mL injection, Inject 1 mL (200 mg total) into the muscle every 28 days., Disp: 10 mL, Rfl: 3

## 2019-08-09 ENCOUNTER — TELEPHONE (OUTPATIENT)
Dept: FAMILY MEDICINE | Facility: CLINIC | Age: 59
End: 2019-08-09

## 2019-08-09 ENCOUNTER — LAB VISIT (OUTPATIENT)
Dept: LAB | Facility: HOSPITAL | Age: 59
End: 2019-08-09
Attending: INTERNAL MEDICINE
Payer: COMMERCIAL

## 2019-08-09 DIAGNOSIS — Z00.00 ANNUAL PHYSICAL EXAM: ICD-10-CM

## 2019-08-09 DIAGNOSIS — Z11.59 NEED FOR HEPATITIS C SCREENING TEST: ICD-10-CM

## 2019-08-09 LAB
CHOLEST SERPL-MCNC: 169 MG/DL (ref 120–199)
CHOLEST/HDLC SERPL: 4.8 {RATIO} (ref 2–5)
GLUCOSE SERPL-MCNC: 77 MG/DL (ref 70–110)
HCV AB SERPL QL IA: NEGATIVE
HDLC SERPL-MCNC: 35 MG/DL (ref 40–75)
HDLC SERPL: 20.7 % (ref 20–50)
LDLC SERPL CALC-MCNC: 109.6 MG/DL (ref 63–159)
NONHDLC SERPL-MCNC: 134 MG/DL
TRIGL SERPL-MCNC: 122 MG/DL (ref 30–150)

## 2019-08-09 PROCEDURE — 82947 ASSAY GLUCOSE BLOOD QUANT: CPT

## 2019-08-09 PROCEDURE — 36415 COLL VENOUS BLD VENIPUNCTURE: CPT

## 2019-08-09 PROCEDURE — 80061 LIPID PANEL: CPT

## 2019-08-09 PROCEDURE — 86803 HEPATITIS C AB TEST: CPT

## 2019-08-12 ENCOUNTER — TELEPHONE (OUTPATIENT)
Dept: FAMILY MEDICINE | Facility: CLINIC | Age: 59
End: 2019-08-12

## 2019-08-12 NOTE — TELEPHONE ENCOUNTER
----- Message from Jerel Earl MD sent at 8/9/2019  7:25 PM CDT -----  The results are within acceptable range.  Please keep regular follow up.

## 2019-09-23 DIAGNOSIS — R79.89 LOW SERUM TESTOSTERONE LEVEL: ICD-10-CM

## 2019-09-25 ENCOUNTER — TELEPHONE (OUTPATIENT)
Dept: UROLOGY | Facility: CLINIC | Age: 59
End: 2019-09-25

## 2019-09-25 NOTE — TELEPHONE ENCOUNTER
Patient requesting testosterone refill.    ----- Message from Xin Burch sent at 9/25/2019 10:35 AM CDT -----  Type: Needs Medical Advice    Who Called:  Patient  Best Call Back Number: [phone]  Additional Information: Patient needs to speak with nurse concerning ordering Testosterone injection/please call back to advise.

## 2019-09-26 RX ORDER — TESTOSTERONE CYPIONATE 200 MG/ML
INJECTION, SOLUTION INTRAMUSCULAR
Qty: 10 ML | Refills: 0 | Status: SHIPPED | OUTPATIENT
Start: 2019-09-26 | End: 2019-10-10 | Stop reason: SDUPTHER

## 2019-09-26 NOTE — TELEPHONE ENCOUNTER
Patient was just wondering why he didn't have any more refills available and if he needed to come in for an appointment to do so?

## 2019-09-26 NOTE — TELEPHONE ENCOUNTER
Patient informed of medication being control substance that needs to be sent in every 4-6 months. He was informed that rx was faxed to his pharmacy.

## 2019-09-26 NOTE — TELEPHONE ENCOUNTER
TRT Injections 200 mg/mL every 28 days refilled for this pt today.    Please let him know that Testosterone is a controlled substance and can only be refilled a few times with original prescription.  He will need a new RX every 4-6 months regardless of the f/up appointments which are yearly for him.    Thanks.

## 2019-10-09 ENCOUNTER — TELEPHONE (OUTPATIENT)
Dept: UROLOGY | Facility: CLINIC | Age: 59
End: 2019-10-09

## 2019-10-09 DIAGNOSIS — E29.1 HYPOGONADISM IN MALE: Primary | ICD-10-CM

## 2019-10-09 NOTE — TELEPHONE ENCOUNTER
Patient states that he needs another rx of testosterone sent in before the end of the month. He said that the pharmacy won't fill 10ml for him so they only gave him 1ml, enough for the one shot last month.    ----- Message from Deborah Chamberlain sent at 10/9/2019 12:11 PM CDT -----  Contact: self  Patient is scheduled for 10/14/19 and needs orders to do blood work tomorrow. Please call patient when in system at [phone] (home) . Thanks!

## 2019-10-10 DIAGNOSIS — R79.89 LOW SERUM TESTOSTERONE LEVEL: ICD-10-CM

## 2019-10-10 RX ORDER — TESTOSTERONE CYPIONATE 200 MG/ML
200 INJECTION, SOLUTION INTRAMUSCULAR
Qty: 1 ML | Refills: 4 | Status: SHIPPED | OUTPATIENT
Start: 2019-10-10 | End: 2019-10-22 | Stop reason: SDUPTHER

## 2019-10-10 NOTE — TELEPHONE ENCOUNTER
Patient informed of lab orders and will go to have labs drawn tomorrow morning. His appointment is rescheduled for 10/18.

## 2019-10-10 NOTE — TELEPHONE ENCOUNTER
Labs are in the computer.  You will probably have to reschedule his appointment.  I don't know if I will have his Testosterone results prior to Monday's office visit.

## 2019-10-11 ENCOUNTER — LAB VISIT (OUTPATIENT)
Dept: LAB | Facility: HOSPITAL | Age: 59
End: 2019-10-11
Attending: NURSE PRACTITIONER
Payer: COMMERCIAL

## 2019-10-11 DIAGNOSIS — E29.1 HYPOGONADISM IN MALE: ICD-10-CM

## 2019-10-11 LAB
COMPLEXED PSA SERPL-MCNC: 0.16 NG/ML (ref 0–4)
ERYTHROCYTE [DISTWIDTH] IN BLOOD BY AUTOMATED COUNT: 13.9 % (ref 11.5–14.5)
HCT VFR BLD AUTO: 51.4 % (ref 40–54)
HGB BLD-MCNC: 16.7 G/DL (ref 14–18)
MCH RBC QN AUTO: 30.6 PG (ref 27–31)
MCHC RBC AUTO-ENTMCNC: 32.5 G/DL (ref 32–36)
MCV RBC AUTO: 94 FL (ref 82–98)
PLATELET # BLD AUTO: 181 K/UL (ref 150–350)
PMV BLD AUTO: 9 FL (ref 9.2–12.9)
RBC # BLD AUTO: 5.46 M/UL (ref 4.6–6.2)
TESTOST SERPL-MCNC: 491 NG/DL (ref 304–1227)
WBC # BLD AUTO: 5.41 K/UL (ref 3.9–12.7)

## 2019-10-11 PROCEDURE — 85027 COMPLETE CBC AUTOMATED: CPT

## 2019-10-11 PROCEDURE — 36415 COLL VENOUS BLD VENIPUNCTURE: CPT

## 2019-10-11 PROCEDURE — 84403 ASSAY OF TOTAL TESTOSTERONE: CPT

## 2019-10-11 PROCEDURE — 84153 ASSAY OF PSA TOTAL: CPT

## 2019-10-22 ENCOUNTER — OFFICE VISIT (OUTPATIENT)
Dept: UROLOGY | Facility: CLINIC | Age: 59
End: 2019-10-22
Payer: COMMERCIAL

## 2019-10-22 VITALS
RESPIRATION RATE: 18 BRPM | BODY MASS INDEX: 28.14 KG/M2 | HEIGHT: 73 IN | HEART RATE: 68 BPM | SYSTOLIC BLOOD PRESSURE: 123 MMHG | DIASTOLIC BLOOD PRESSURE: 69 MMHG | TEMPERATURE: 98 F | WEIGHT: 212.31 LBS

## 2019-10-22 DIAGNOSIS — N52.9 ERECTILE DYSFUNCTION, UNSPECIFIED ERECTILE DYSFUNCTION TYPE: ICD-10-CM

## 2019-10-22 DIAGNOSIS — E29.1 HYPOGONADISM IN MALE: Primary | ICD-10-CM

## 2019-10-22 DIAGNOSIS — R79.89 LOW SERUM TESTOSTERONE LEVEL: ICD-10-CM

## 2019-10-22 LAB
BILIRUB SERPL-MCNC: NORMAL MG/DL
BLOOD URINE, POC: NORMAL
COLOR, POC UA: YELLOW
GLUCOSE UR QL STRIP: NORMAL
KETONES UR QL STRIP: NORMAL
LEUKOCYTE ESTERASE URINE, POC: NORMAL
NITRITE, POC UA: NORMAL
PH, POC UA: 5.5
PROTEIN, POC: NORMAL
SPECIFIC GRAVITY, POC UA: 1.02
UROBILINOGEN, POC UA: 0.2

## 2019-10-22 PROCEDURE — 81002 URINALYSIS NONAUTO W/O SCOPE: CPT | Mod: S$GLB,,, | Performed by: NURSE PRACTITIONER

## 2019-10-22 PROCEDURE — 99999 PR PBB SHADOW E&M-EST. PATIENT-LVL IV: CPT | Mod: PBBFAC,,, | Performed by: NURSE PRACTITIONER

## 2019-10-22 PROCEDURE — 99999 PR PBB SHADOW E&M-EST. PATIENT-LVL IV: ICD-10-PCS | Mod: PBBFAC,,, | Performed by: NURSE PRACTITIONER

## 2019-10-22 PROCEDURE — 3008F BODY MASS INDEX DOCD: CPT | Mod: CPTII,S$GLB,, | Performed by: NURSE PRACTITIONER

## 2019-10-22 PROCEDURE — 99214 OFFICE O/P EST MOD 30 MIN: CPT | Mod: 25,S$GLB,, | Performed by: NURSE PRACTITIONER

## 2019-10-22 PROCEDURE — 3008F PR BODY MASS INDEX (BMI) DOCUMENTED: ICD-10-PCS | Mod: CPTII,S$GLB,, | Performed by: NURSE PRACTITIONER

## 2019-10-22 PROCEDURE — 99214 PR OFFICE/OUTPT VISIT, EST, LEVL IV, 30-39 MIN: ICD-10-PCS | Mod: 25,S$GLB,, | Performed by: NURSE PRACTITIONER

## 2019-10-22 PROCEDURE — 81002 POCT URINE DIPSTICK WITHOUT MICROSCOPE: ICD-10-PCS | Mod: S$GLB,,, | Performed by: NURSE PRACTITIONER

## 2019-10-22 RX ORDER — TESTOSTERONE CYPIONATE 200 MG/ML
200 INJECTION, SOLUTION INTRAMUSCULAR
Qty: 1 ML | Refills: 4 | Status: SHIPPED | OUTPATIENT
Start: 2019-10-22 | End: 2019-10-22 | Stop reason: SDUPTHER

## 2019-10-22 RX ORDER — TESTOSTERONE CYPIONATE 200 MG/ML
200 INJECTION, SOLUTION INTRAMUSCULAR
Qty: 1 ML | Refills: 5 | Status: SHIPPED | OUTPATIENT
Start: 2019-10-22 | End: 2020-01-30

## 2019-10-22 NOTE — PROGRESS NOTES
[facility] Note  Staff: SHERI Briseno    PCP:     Chief Complaint: Follow-up    Subjective:        HPI: Edwin Conklin is a 59 y.o. male presents today for routine recheck/follow-up.  Pt has hx of low serum testosterone levels.  He is currently on Testosterone injections 200 mg/mL IM every 28 days at this time and tolerating medication with no problems.    Pt was last seen by me on 02/18/19.  The pt was last seen by Dr. Hernandez on 01/30/2018.     Mr. Conklin is a 59-year-old male, who has a long history of low serum   testosterone.  The patient is here for his 6-8 month evaluation.  Pt is doing well, does state his ED is a little worse than normal, unsure whether medication is weaning off faster since last ov with me.   The patient referred to have nocturia x2.  No dysuria.  No hematuria.  The flow is adequate.  He feels that he empties the bladder satisfactory.     FAMILY HISTORY:  Negative for prostate cancer.     PAST MEDICAL AND PAST SURGICAL HISTORY:  Have not changed since the last visit.  All this was reviewed including medications and allergies.     Past Testosterone levels:  10/11/19:     491  02/12/2019:  664  01/11/2017:  273    Last PSA Screening:   Lab Results   Component Value Date    PSA 0.14 09/16/2011    PSA 0.15 07/28/2010    PSADIAG 0.16 10/11/2019    PSADIAG 0.12 02/12/2019    PSADIAG 0.22 08/08/2016     REVIEW OF SYSTEMS:  A comprehensive 10 system review was performed and is negative except as noted above in HPI    PMHx:  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      PSHx:  Past Surgical History:   Procedure Laterality Date    EYE SURGERY      ROTATOR CUFF REPAIR  2/13    left shoulder    VASECTOMY       Allergies:  Patient has no known allergies.    Medications:  reviewed     Objective:     Vitals:    10/22/19 0925   BP: 123/69   Pulse: 68   Resp: 18   Temp: 97.5 °F (36.4 °C)     General:WDWN in NAD  Eyes: PERRLA, normal conjunctiva  Respiratory: no increased work on breathing, clear to auscultation  Cardiovascular: regular rate and rhythm. No obvious extremity edema.  GI: palpation of masses. No tenderness. No hepatosplenomegaly to palpation.  Musculoskeletal: normal range of motion of bilateral upper extremities. Normal muscle strength and tone.  Skin: no obvious rashes or lesions. No tightening of skin noted.  Neurologic: CN grossly normal. Normal sensation.   Psychiatric: awake, alert and oriented x 3. Mood and affect normal. Cooperative.    LABS REVIEW:  UA today:  Color:Clear, Yellow  Spec. Grav.  1.020  PH  5.5  Negative for leukocytes, nitrates, protein, glucose, ketones, urobili, bili, and blood.    Assessment:       1. Hypogonadism in male    2. Low serum testosterone level    3. Erectile dysfunction, unspecified erectile dysfunction type          Plan:   Low serum Testosterone; ED:    We will increase TRT to 200 mg/mL to every 21 days at this time.  We will recheck lab work before January 2020 dosage to review any improvement at that time.  Pt verbalized understanding.    F/u after we review lab results, we will contact pt to discuss and then set up next office visit at that time.    [facility]: Active    Olamide Larios, SHERI

## 2019-11-01 ENCOUNTER — OFFICE VISIT (OUTPATIENT)
Dept: FAMILY MEDICINE | Facility: CLINIC | Age: 59
End: 2019-11-01
Payer: COMMERCIAL

## 2019-11-01 VITALS
HEART RATE: 95 BPM | SYSTOLIC BLOOD PRESSURE: 124 MMHG | WEIGHT: 213.88 LBS | HEIGHT: 73 IN | BODY MASS INDEX: 28.34 KG/M2 | OXYGEN SATURATION: 96 % | DIASTOLIC BLOOD PRESSURE: 62 MMHG | TEMPERATURE: 100 F

## 2019-11-01 DIAGNOSIS — J10.1 INFLUENZA A: ICD-10-CM

## 2019-11-01 DIAGNOSIS — R68.89 FLU-LIKE SYMPTOMS: Primary | ICD-10-CM

## 2019-11-01 LAB
CTP QC/QA: YES
FLUAV AG NPH QL: POSITIVE
FLUBV AG NPH QL: NEGATIVE

## 2019-11-01 PROCEDURE — 99213 PR OFFICE/OUTPT VISIT, EST, LEVL III, 20-29 MIN: ICD-10-PCS | Mod: 25,S$GLB,, | Performed by: NURSE PRACTITIONER

## 2019-11-01 PROCEDURE — 99213 OFFICE O/P EST LOW 20 MIN: CPT | Mod: 25,S$GLB,, | Performed by: NURSE PRACTITIONER

## 2019-11-01 PROCEDURE — 87804 INFLUENZA ASSAY W/OPTIC: CPT | Mod: QW,S$GLB,, | Performed by: NURSE PRACTITIONER

## 2019-11-01 PROCEDURE — 87804 POCT INFLUENZA A/B: ICD-10-PCS | Mod: 59,QW,S$GLB, | Performed by: NURSE PRACTITIONER

## 2019-11-01 PROCEDURE — 3008F BODY MASS INDEX DOCD: CPT | Mod: S$GLB,,, | Performed by: NURSE PRACTITIONER

## 2019-11-01 PROCEDURE — 3008F PR BODY MASS INDEX (BMI) DOCUMENTED: ICD-10-PCS | Mod: S$GLB,,, | Performed by: NURSE PRACTITIONER

## 2019-11-01 RX ORDER — OSELTAMIVIR PHOSPHATE 75 MG/1
75 CAPSULE ORAL 2 TIMES DAILY
Qty: 10 CAPSULE | Refills: 0 | Status: SHIPPED | OUTPATIENT
Start: 2019-11-01 | End: 2019-11-06

## 2019-11-01 NOTE — PROGRESS NOTES
SUBJECTIVE:      Patient ID: Edwin Conklin is a 59 y.o. male.    Chief Complaint: Cough (since 10/30); Sore Throat; and Fever (pt has been alternating tylenol & ibuprofen)    Established patient of Dr. Earl here for c/o sore throat, nonproductive cough and fever up to 102 x 2 days.     Cough   This is a new problem. The current episode started in the past 7 days. The problem has been gradually worsening. The cough is non-productive. Associated symptoms include chills, a fever (up to 102), nasal congestion, rhinorrhea and a sore throat. Pertinent negatives include no chest pain, ear congestion, ear pain, headaches, hemoptysis, myalgias, postnasal drip, rash, shortness of breath, sweats or wheezing. Nothing aggravates the symptoms. Risk factors: sick contact exposure  He has tried nothing for the symptoms. The treatment provided no relief. There is no history of asthma or COPD.   URI    This is a new problem. The current episode started in the past 7 days. The problem has been gradually worsening. The maximum temperature recorded prior to his arrival was 102 - 102.9 F. The fever has been present for 1 to 2 days. Associated symptoms include congestion, coughing, rhinorrhea and a sore throat. Pertinent negatives include no abdominal pain, chest pain, diarrhea, dysuria, ear pain, headaches, nausea, neck pain, plugged ear sensation, rash, sinus pain, sneezing, swollen glands, vomiting or wheezing. He has tried acetaminophen, NSAIDs and sleep for the symptoms. The treatment provided mild relief.       Family History   Problem Relation Age of Onset    Alzheimer's disease Father     Emphysema Sister       Social History     Socioeconomic History    Marital status:      Spouse name: Theresa Lucio    Number of children: Not on file    Years of education: Not on file    Highest education level: Not on file   Occupational History    Occupation: Salesman     Comment: [employer]   Social Needs     Financial resource strain: Not on file    Food insecurity:     Worry: Not on file     Inability: Not on file    Transportation needs:     Medical: Not on file     Non-medical: Not on file   Tobacco Use    Smoking status: Former Smoker     Packs/day: 0.25     Years: 0.25     Pack years: 0.06     Types: Cigarettes     Start date: 1974     Last attempt to quit: 1975     Years since quittin.8    Smokeless tobacco: Never Used   Substance and Sexual Activity    Alcohol use: Yes     Comment: 3x's week-beer or crown    Drug use: Never    Sexual activity: Yes     Partners: Female   Lifestyle    Physical activity:     Days per week: Not on file     Minutes per session: Not on file    Stress: Not at all   Relationships    Social connections:     Talks on phone: Not on file     Gets together: Not on file     Attends Congregational service: Not on file     Active member of club or organization: Not on file     Attends meetings of clubs or organizations: Not on file     Relationship status: Not on file   Other Topics Concern    Not on file   Social History Narrative    Annamaria Daughter     Current Outpatient Medications   Medication Sig Dispense Refill    testosterone cypionate (DEPOTESTOTERONE CYPIONATE) 200 mg/mL injection Inject 1 mL (200 mg total) into the muscle every 21 days. 1 mL 5    oseltamivir (TAMIFLU) 75 MG capsule Take 1 capsule (75 mg total) by mouth 2 (two) times daily. for 5 days 10 capsule 0     No current facility-administered medications for this visit.      Review of patient's allergies indicates:  No Known Allergies   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      Past Surgical History:   Procedure Laterality Date    EYE SURGERY      ROTATOR CUFF REPAIR      left shoulder    VASECTOMY    "      Review of Systems   Constitutional: Positive for chills, fatigue and fever (up to 102). Negative for appetite change, diaphoresis and unexpected weight change.   HENT: Positive for congestion, rhinorrhea and sore throat. Negative for ear discharge, ear pain, postnasal drip, sinus pressure, sinus pain, sneezing and trouble swallowing.    Eyes: Negative for pain, discharge, itching and visual disturbance.   Respiratory: Positive for cough. Negative for hemoptysis, shortness of breath and wheezing.    Cardiovascular: Negative for chest pain and palpitations.   Gastrointestinal: Negative for abdominal pain, diarrhea, nausea and vomiting.   Genitourinary: Negative for dysuria and frequency.   Musculoskeletal: Negative for myalgias and neck pain.   Skin: Negative for rash.   Allergic/Immunologic: Negative for immunocompromised state.   Neurological: Negative for dizziness, weakness and headaches.   Hematological: Negative for adenopathy.      OBJECTIVE:      Vitals:    11/01/19 1609   BP: 124/62   BP Location: Left arm   Patient Position: Sitting   BP Method: X-Large (Manual)   Pulse: 95   Temp: 99.6 °F (37.6 °C)   TempSrc: Oral   SpO2: 96%   Weight: 97 kg (213 lb 14.4 oz)   Height: 6' 1" (1.854 m)     Physical Exam   Constitutional: He is oriented to person, place, and time. He appears well-developed and well-nourished. No distress.   HENT:   Head: Normocephalic and atraumatic.   Right Ear: Tympanic membrane, external ear and ear canal normal.   Left Ear: Tympanic membrane, external ear and ear canal normal.   Nose: Mucosal edema and rhinorrhea present. No epistaxis. Right sinus exhibits no maxillary sinus tenderness and no frontal sinus tenderness. Left sinus exhibits no maxillary sinus tenderness and no frontal sinus tenderness.   Mouth/Throat: Uvula is midline and mucous membranes are normal. Posterior oropharyngeal erythema (mild posterior pharynx ) present. No oropharyngeal exudate. No tonsillar exudate. "   Eyes: Pupils are equal, round, and reactive to light. Conjunctivae and EOM are normal. No scleral icterus.   Neck: Normal range of motion. Neck supple.   Cardiovascular: Normal rate, regular rhythm and normal heart sounds.   No murmur heard.  Pulmonary/Chest: Effort normal and breath sounds normal. He has no wheezes. He has no rales.   Abdominal: Soft. Bowel sounds are normal. He exhibits no distension. There is no tenderness.   Lymphadenopathy:     He has cervical adenopathy.        Right cervical: Superficial cervical adenopathy present.        Left cervical: Superficial cervical adenopathy present.        Right: No supraclavicular adenopathy present.        Left: No supraclavicular adenopathy present.   Neurological: He is alert and oriented to person, place, and time.   Skin: Skin is warm and dry. No rash noted. He is not diaphoretic.   Psychiatric: He has a normal mood and affect. His behavior is normal. Judgment and thought content normal.   Nursing note and vitals reviewed.     Assessment:       1. Flu-like symptoms    2. Influenza A        Plan:       Flu-like symptoms  -     POCT Influenza A/B    Influenza A  -     oseltamivir (TAMIFLU) 75 MG capsule; Take 1 capsule (75 mg total) by mouth 2 (two) times daily. for 5 days  Dispense: 10 capsule; Refill: 0     *Rapid flu test positive for influenza A. Patient advised that influenza typically lasts about 7 days. Tamiflu prescribed x 5 days.  Supportive care advised: drink lots of fluids such as water, juice, and warm soup, get plenty of rest, take acetaminophen or ibuprofen for fever and pain.    Call clinic if fever is 100.4°F (38°C) or higher for more than 7 days, or if patient becomes dizzy, lightheaded, or short of breath.      Follow up if symptoms worsen or fail to improve.      11/1/2019 TIM Reyes, FNP

## 2019-11-01 NOTE — PATIENT INSTRUCTIONS
"  Getting a Flu Vaccine  The flu (influenza) is caused by a virus that is easily spread. A flu vaccine is your best chance to avoid the flu. The vaccine is given in the form of a shot (injection) or a nasal spray. Its best to get vaccinated each year when the flu vaccine is available in your area. This can be done at your healthcare providers office or a health clinic. Drugstores, senior centers, and workplaces often offer flu vaccines, too. If you want to know when the vaccine is available or if you have questions about getting vaccinated, ask your healthcare provider.  Flu facts  · The flu vaccine will not give you the flu.  · The flu is caused by a virus. It cant be treated with antibiotics.  · The flu can be life-threatening, especially for people in high-risk groups.  · Influenza is not the same as stomach flu, the 24-hour bug that causes vomiting and diarrhea. This is most likely because of a GI (gastrointestinal) infection--not the flu.   Flu symptoms  Flu symptoms tend to come on quickly. Fever, headache, fatigue, cough, sore throat, runny nose, and muscle aches are symptoms of the flu. Children may have upset stomach or vomiting, but adults usually dont. Some symptoms such as fatigue and cough can last a few weeks.  How a flu vaccine protects you    There are many types (strains) of flu viruses. Medical experts predict which strains are most likely to make people sick each year. Flu vaccines are made from these strains. With the shot, "killed" (inactivated) flu viruses are injected into your body. With the nasal spray, live and weakened viruses are sprayed into your nose. The viruses in both vaccines cannot make you sick. But they do cause the body to make antibodies to fight these flu strains. If you are exposed to the same strains later in the flu season, the antibodies will fight off the virus. Your healthcare provider can tell you which type of flu vaccine is right for you.  Who should get the flu " vaccine?  The CDC recommends that infants over the age of 6 months and all children and adults should get a flu shot every year.  Some people are at an increased risk of developing serious complications from the flu. It's extremely important that these people get the vaccine. They include those with:  · Long-term heart and lung conditions  · Other serious health conditions:  ¨ Endocrine disorders such as diabetes  ¨ Kidney or liver disorders  ¨ Weak immune system from disease or medical treatment. This could be a person with HIV or AIDS or those taking long-term steroids or medicines to treat cancer  ¨ Blood disorders such as sickle cell disease  It is also very important that others who have an increased risk of being exposed to the flu or are around people with increased risk for complications get the vaccine. They are:  · Healthcare providers and other staff who provide care in hospitals, nursing homes, home health, and other facilities  · Household members, including children of people in high-risk groups  Types of flu vaccines  The flu vaccine is available as a shot and as a nasal spray. Your healthcare provider will recommend the vaccine that is best for you.  Flu shot  The shot is available in a few different forms. There is a high-dose vaccine for those over age 65 and a vaccine for those with egg allergies. It's safe for most people. Talk with your provider if you have had:  · A severe allergic reaction to a previous flu vaccine  · Guillain-Barré syndrome. This is a severe paralyzing condition.  Nasal spray  The nasal spray is not recommended for the 7957-3444 flu season. The CDC says this is because the nasal spray did not seem to protect against the flu over the last several flu seasons. In the past, it was meant for people ages 2 to 49.  Date Last Reviewed: 8/27/2014  © 3303-4188 [employer]. [address] [place] [address], [place] [address]. All rights reserved. This information is not intended as  a substitute for professional medical care. Always follow your healthcare professional's instructions.        Influenza (Adult)    Influenza is also called the flu. It is a viral illness that affects the air passages of your lungs. It is different from the common cold. The flu can easily be passed from one to person to another. It may be spread through the air by coughing and sneezing. Or it can be spread by touching the sick person and then touching your own eyes, nose, or mouth.  The flu starts 1 to 3 days after you are exposed to the flu virus. It may last for 1 to 2 weeks but many people feel tired or fatigued for many weeks afterward. You usually dont need to take antibiotics unless you have a complication. This might be an ear or sinus infection or pneumonia.  Symptoms of the flu may be mild or severe. They can include extreme tiredness (wanting to stay in bed all day), chills, fevers, muscle aches, soreness with eye movement, headache, and a dry, hacking cough.  Home care  Follow these guidelines when caring for yourself at home:  · Avoid being around cigarette smoke, whether yours or other peoples.  · Acetaminophen or ibuprofen will help ease your fever, muscle aches, and headache. Dont give aspirin to anyone younger than 18 who has the flu. Aspirin can harm the liver.  · Nausea and loss of appetite are common with the flu. Eat light meals. Drink 6 to 8 glasses of liquids every day. Good choices are water, sport drinks, soft drinks without caffeine, juices, tea, and soup. Extra fluids will also help loosen secretions in your nose and lungs.  · Over-the-counter cold medicines will not make the flu go away faster. But the medicines may help with coughing, sore throat, and congestion in your nose and sinuses. Dont use a decongestant if you have high blood pressure.  · Stay home until your fever has been gone for at least 24 hours without using medicine to reduce fever.  Follow-up care  Follow up with your  healthcare provider, or as advised, if you are not getting better over the next week.  If you are age 65 or older, talk with your provider about getting a pneumococcal vaccine every 5 years. You should also get this vaccine if you have chronic asthma or COPD. All adults should get a flu vaccine every fall. Ask your provider about this.  When to seek medical advice  Call your healthcare provider right away if any of these occur:  · Cough with lots of colored mucus (sputum) or blood in your mucus  · Chest pain, shortness of breath, wheezing, or trouble breathing  · Severe headache, or face, neck, or ear pain  · New rash with fever  · Fever of 100.4°F (38°C) or higher, or as directed by your healthcare provider  · Confusion, behavior change, or seizure  · Severe weakness or dizziness  · You get a new fever or cough after getting better for a few days  Date Last Reviewed: 1/1/2017  © 0083-6725 [employer]. [address] [place] [address]. All rights reserved. This information is not intended as a substitute for professional medical care. Always follow your healthcare professional's instructions.        Flu Vaccines for Adults   The flu (influenza) is caused by a virus that is easily spread. A flu vaccine protects you and others from the flu. Its best to get a flu shot every year in late summer or early fall, as soon as the vaccine is available in your area. You can get it at your healthcare providers office or a health clinic. Pharmacies, senior centers, and workplaces often offer flu shots, too. If you want to know if your provider has the flu vaccine available, or if you have other questions, ask your healthcare provider.    Flu facts  · The flu shot wont give you the flu. The virus that is in the flu shot has been killed (inactivated).  · The flu can be dangerous--even life-threatening. Every year thousands of people die from complications from the flu.  · The flu is caused by a virus. It  cant be treated with antibiotics.  · Influenza is not the same as stomach flu, the 24-hour virus that causes vomiting and diarrhea. The stomach flu most likely happens because of a GI (gastrointestinal) infection, not the flu.  · You need to get a flu shot each year.   Flu symptoms  Flu symptoms tend to come on quickly. They include:  · Fever  · Headache  · Tiredness (fatigue)  · Cough  · Sore throat  · Runny nose  · Muscle aches  Upset stomach and vomiting are not common for adults. Some symptoms such as tiredness and cough may last for many weeks.  How a flu vaccine protects you  There are many types (strains) of the flu virus. Medical experts predict which strains are most likely to make people sick each year. Flu shots are made from these strains. When you get a flu vaccine, killed (inactivated) viruses are injected into your body. These cant give you the flu. But they do cause your body to make antibodies to fight these flu strains. If you are exposed to the same strains later in the flu season, the antibodies will fight off the germs.  Who should get the flu vaccine?  The CDC recommends that infants over the age of 6 months and all children and adults should get a flu shot every year.  Some people are at an increased risk of developing serious complications from the flu. It is extremely important that these people get the vaccine. They include those with:  · Long-term heart and lung conditions  · Other serious health conditions such as:  ¨ Endocrine disorders such as diabetes  ¨ Kidney or liver disorders  ¨ Weakened immune system from disease or medical treatment. For example, people with HIV or AIDS, or those taking long-term steroids or medicines to treat cancer.  ¨ Blood disorders such as sickle cell disease  It is also very important that others who have an increased risk of being exposed to the flu or are around people with increased risk for complications get the vaccine. This includes:  · Healthcare  providers and other staff who provide care in hospitals, nursing homes, home health, and other facilities  · Household members, including children of people in high-risk groups  Types of flu vaccines  The flu vaccine is available as a regular and a high-strength shot. Your healthcare provider will recommend the vaccine that is best for you.  Flu shot  The flu shot is available in a few different forms. Your healthcare provider will determine which vaccine is right for you. There is a high-dose vaccine for those over age 65 and a vaccine for those with egg allergies. It is safe for most people. Talk with your provider if you have had:  · A severe allergic reaction to a previous flu vaccine  · Guillain-Barré syndrome. This is a severe paralyzing condition.  Nasal spray  The nasal spray is not recommended for the 1983-9279 flu season. The CDC says this is because the nasal spray did not seem to protect against the flu over the last several flu seasons. In the past, it was meant for people ages 2 to 49.  Date Last Reviewed: 12/1/2016  © 2736-7435 [employer]. [address] [place] [address]. All rights reserved. This information is not intended as a substitute for professional medical care. Always follow your healthcare professional's instructions.

## 2019-11-11 PROBLEM — Z00.00 ANNUAL PHYSICAL EXAM: Status: RESOLVED | Noted: 2019-08-07 | Resolved: 2019-11-11

## 2020-01-16 ENCOUNTER — LAB VISIT (OUTPATIENT)
Dept: LAB | Facility: HOSPITAL | Age: 60
End: 2020-01-16
Attending: NURSE PRACTITIONER
Payer: COMMERCIAL

## 2020-01-16 DIAGNOSIS — E29.1 HYPOGONADISM IN MALE: ICD-10-CM

## 2020-01-16 DIAGNOSIS — R79.89 LOW SERUM TESTOSTERONE LEVEL: ICD-10-CM

## 2020-01-16 LAB
ERYTHROCYTE [DISTWIDTH] IN BLOOD BY AUTOMATED COUNT: 13.2 % (ref 11.5–14.5)
HCT VFR BLD AUTO: 52.4 % (ref 40–54)
HGB BLD-MCNC: 16.9 G/DL (ref 14–18)
MCH RBC QN AUTO: 30.2 PG (ref 27–31)
MCHC RBC AUTO-ENTMCNC: 32.3 G/DL (ref 32–36)
MCV RBC AUTO: 94 FL (ref 82–98)
PLATELET # BLD AUTO: 190 K/UL (ref 150–350)
PMV BLD AUTO: 9.1 FL (ref 9.2–12.9)
RBC # BLD AUTO: 5.59 M/UL (ref 4.6–6.2)
WBC # BLD AUTO: 5.31 K/UL (ref 3.9–12.7)

## 2020-01-16 PROCEDURE — 84403 ASSAY OF TOTAL TESTOSTERONE: CPT

## 2020-01-16 PROCEDURE — 85027 COMPLETE CBC AUTOMATED: CPT

## 2020-01-16 PROCEDURE — 36415 COLL VENOUS BLD VENIPUNCTURE: CPT

## 2020-01-17 LAB — TESTOST SERPL-MCNC: 130 NG/DL (ref 304–1227)

## 2020-01-30 ENCOUNTER — OFFICE VISIT (OUTPATIENT)
Dept: UROLOGY | Facility: CLINIC | Age: 60
End: 2020-01-30
Payer: COMMERCIAL

## 2020-01-30 VITALS
DIASTOLIC BLOOD PRESSURE: 89 MMHG | RESPIRATION RATE: 16 BRPM | SYSTOLIC BLOOD PRESSURE: 146 MMHG | TEMPERATURE: 98 F | HEIGHT: 73 IN | BODY MASS INDEX: 27.17 KG/M2 | HEART RATE: 78 BPM | WEIGHT: 205 LBS

## 2020-01-30 DIAGNOSIS — R79.89 LOW SERUM TESTOSTERONE LEVEL: Primary | ICD-10-CM

## 2020-01-30 PROCEDURE — 3008F BODY MASS INDEX DOCD: CPT | Mod: S$GLB,,, | Performed by: UROLOGY

## 2020-01-30 PROCEDURE — 99214 OFFICE O/P EST MOD 30 MIN: CPT | Mod: S$GLB,,, | Performed by: UROLOGY

## 2020-01-30 PROCEDURE — 3008F PR BODY MASS INDEX (BMI) DOCUMENTED: ICD-10-PCS | Mod: S$GLB,,, | Performed by: UROLOGY

## 2020-01-30 PROCEDURE — 99999 PR PBB SHADOW E&M-EST. PATIENT-LVL III: ICD-10-PCS | Mod: PBBFAC,,, | Performed by: UROLOGY

## 2020-01-30 PROCEDURE — 99999 PR PBB SHADOW E&M-EST. PATIENT-LVL III: CPT | Mod: PBBFAC,,, | Performed by: UROLOGY

## 2020-01-30 PROCEDURE — 99214 PR OFFICE/OUTPT VISIT, EST, LEVL IV, 30-39 MIN: ICD-10-PCS | Mod: S$GLB,,, | Performed by: UROLOGY

## 2020-01-30 RX ORDER — TESTOSTERONE CYPIONATE 200 MG/ML
100 INJECTION, SOLUTION INTRAMUSCULAR
Qty: 10 ML | Refills: 0 | Status: SHIPPED | OUTPATIENT
Start: 2020-01-30 | End: 2020-05-18 | Stop reason: SDUPTHER

## 2020-01-31 NOTE — PROGRESS NOTES
Subjective:       Patient ID: Edwin Conklin is a 59 y.o. male.    Chief Complaint:   Low serum testosterone level  HPI     Mr. Conklin is a 59-year-old male last seen by our service on October 2019 by Ms. Olamide Cuadra.  At that point he has his prostate evaluation and his PSA was found to be at 0.16.  The patient is also suffering of low serum testosterone for what he is been medicated with 200 mg of Depo-Testosterone IM Q 28 days.  He refers that still is having problems at testosterone level was measure 2 days before the last injection and was found to be around 150 mg .    I suggested to the patient that we produce a change in the way that the testosterone is provided to him.  I suggest that we use 100 mg every 2 weeks and we should measure his testosterone level after 1 week of the injection.  The rationale risks and complication of this type of testosterone therapy has been fully disclosed to the patient and he agreed to proceed with that.  He is to be noted that he is giving himself the injections.  I placed an order for the next testosterone level approximately 2 months from now after a week of the injection.    tReview of Systems   Constitutional: Positive for fatigue. Negative for activity change and appetite change.   HENT: Negative.    Eyes: Negative for discharge.   Respiratory: Negative for cough and shortness of breath.    Cardiovascular: Negative for chest pain and palpitations.   Gastrointestinal: Negative for abdominal distention, abdominal pain, constipation and vomiting.   Genitourinary: Negative for discharge, dysuria, flank pain, frequency, hematuria, testicular pain and urgency.   Musculoskeletal: Negative for arthralgias.   Skin: Negative for rash.   Neurological: Negative for dizziness.   Psychiatric/Behavioral: The patient is not nervous/anxious.            Objective:      Physical Exam   Constitutional: He appears well-developed and well-nourished.   HENT:   Head: Normocephalic.    Eyes: Pupils are equal, round, and reactive to light.   Neck: Normal range of motion.   Cardiovascular: Normal rate.    Pulmonary/Chest: Effort normal.   Abdominal: Soft. He exhibits no distension and no mass. There is no tenderness.   Genitourinary: Rectal exam shows no external hemorrhoid, no mass and no tenderness. Prostate is not enlarged and not tender.   Musculoskeletal: Normal range of motion.   Neurological: He is alert.   Skin: Skin is warm.     Psychiatric: He has a normal mood and affect.       Assessment:       1. Low serum testosterone level        Plan:       Low serum testosterone level  -     Testosterone; Future; Expected date: 03/11/2020    Other orders  -     testosterone cypionate (DEPOTESTOTERONE CYPIONATE) 200 mg/mL injection; Inject 0.5 mLs (100 mg total) into the muscle every 14 (fourteen) days. for 20 doses  Dispense: 10 mL; Refill: 0     Start with 0.5 cc of Depo-Testosterone IM Q 2 weeks repeat testosterone level early March to 20 as indicated to the patient.  RTC end of March 2020.

## 2020-03-13 ENCOUNTER — LAB VISIT (OUTPATIENT)
Dept: LAB | Facility: HOSPITAL | Age: 60
End: 2020-03-13
Attending: UROLOGY
Payer: COMMERCIAL

## 2020-03-13 DIAGNOSIS — R79.89 LOW SERUM TESTOSTERONE LEVEL: ICD-10-CM

## 2020-03-13 LAB — TESTOST SERPL-MCNC: 496 NG/DL (ref 304–1227)

## 2020-03-13 PROCEDURE — 84403 ASSAY OF TOTAL TESTOSTERONE: CPT

## 2020-03-13 PROCEDURE — 36415 COLL VENOUS BLD VENIPUNCTURE: CPT

## 2020-05-13 RX ORDER — TESTOSTERONE CYPIONATE 200 MG/ML
INJECTION, SOLUTION INTRAMUSCULAR
Qty: 1 ML | OUTPATIENT
Start: 2020-05-13

## 2020-05-14 DIAGNOSIS — R79.89 LOW SERUM TESTOSTERONE LEVEL: Primary | ICD-10-CM

## 2020-05-14 NOTE — TELEPHONE ENCOUNTER
----- Message from Sihra Hale MA sent at 5/14/2020  9:37 AM CDT -----  Contact: self/ [phone]  Refill Request    Medication:testosterone cypionate (DEPOTESTOTERONE CYPIONATE) 200 mg/mL injection   Provider: Dr. Hernandez  Pharmacy and Phone:[id number] [place] [place] [address] AT [facility] [address] [phone] (Phone)   Additional information: n/a

## 2020-05-15 NOTE — TELEPHONE ENCOUNTER
Pt requesting refill on testosterone cypionate 200 mg/ml injection. [id number] - [place] [place] - [address]

## 2020-05-15 NOTE — TELEPHONE ENCOUNTER
----- Message from Anna Campbell sent at 5/15/2020 11:36 AM CDT -----  Type:  Patient Returning Call    Who Called: pt  Who Left Message for Patient: nurse  Does the patient know what this is regarding?:   About   Calling  In his   scripts  Best Call Back Number:  [phone]  Additional Information:    Please call

## 2020-05-15 NOTE — TELEPHONE ENCOUNTER
LVM informing pt Rx request has been sent to the MD once it is completed we will be in contact.     ----- Message from Heaven Torres sent at 5/15/2020 10:34 AM CDT -----  Patient is calling to follow up on his previous requests for refill of testosterone.  He said he was supposed to take his shot on 5/14.  His pharmacy is:   [id number] - [place] - [address] AT [facility] [address]  [address] [place] [address]  Phone: [phone] Fax: [phone]    Please call him at [phone].  Thank you!

## 2020-05-25 RX ORDER — TESTOSTERONE CYPIONATE 200 MG/ML
100 INJECTION, SOLUTION INTRAMUSCULAR
Qty: 10 ML | Refills: 0 | OUTPATIENT
Start: 2020-05-25 | End: 2021-02-16

## 2020-06-24 DIAGNOSIS — R79.89 LOW SERUM TESTOSTERONE LEVEL: ICD-10-CM

## 2020-06-24 RX ORDER — TESTOSTERONE CYPIONATE 200 MG/ML
100 INJECTION, SOLUTION INTRAMUSCULAR
Qty: 10 ML | Refills: 0 | Status: CANCELLED | OUTPATIENT
Start: 2020-06-24 | End: 2021-03-18

## 2020-06-25 NOTE — TELEPHONE ENCOUNTER
Called patient to inform that prescription request was sent to the wrong provider. Dr. Hernandez would have to refill medication. Patient verbalized understanding.

## 2020-06-29 DIAGNOSIS — R79.89 LOW SERUM TESTOSTERONE LEVEL: ICD-10-CM

## 2020-06-29 RX ORDER — TESTOSTERONE CYPIONATE 200 MG/ML
INJECTION, SOLUTION INTRAMUSCULAR
Qty: 10 ML | Refills: 0 | Status: SHIPPED | OUTPATIENT
Start: 2020-06-29 | End: 2020-08-12 | Stop reason: SDUPTHER

## 2020-06-29 NOTE — TELEPHONE ENCOUNTER
Called and spoke to patient to inform that prescription was sent the pharmacy. Patient verbalized understanding.

## 2020-07-17 ENCOUNTER — TELEPHONE (OUTPATIENT)
Dept: UROLOGY | Facility: CLINIC | Age: 60
End: 2020-07-17

## 2020-07-17 DIAGNOSIS — R79.89 LOW SERUM TESTOSTERONE LEVEL: Primary | ICD-10-CM

## 2020-07-22 ENCOUNTER — OFFICE VISIT (OUTPATIENT)
Dept: FAMILY MEDICINE | Facility: CLINIC | Age: 60
End: 2020-07-22
Payer: COMMERCIAL

## 2020-07-22 VITALS
HEIGHT: 73 IN | WEIGHT: 208 LBS | HEART RATE: 67 BPM | BODY MASS INDEX: 27.57 KG/M2 | SYSTOLIC BLOOD PRESSURE: 135 MMHG | DIASTOLIC BLOOD PRESSURE: 78 MMHG

## 2020-07-22 DIAGNOSIS — K64.8 INTERNAL HEMORRHOIDS: Primary | ICD-10-CM

## 2020-07-22 PROCEDURE — 3008F BODY MASS INDEX DOCD: CPT | Mod: S$GLB,,, | Performed by: INTERNAL MEDICINE

## 2020-07-22 PROCEDURE — 99212 OFFICE O/P EST SF 10 MIN: CPT | Mod: S$GLB,,, | Performed by: INTERNAL MEDICINE

## 2020-07-22 PROCEDURE — 3008F PR BODY MASS INDEX (BMI) DOCUMENTED: ICD-10-PCS | Mod: S$GLB,,, | Performed by: INTERNAL MEDICINE

## 2020-07-22 PROCEDURE — 99212 PR OFFICE/OUTPT VISIT, EST, LEVL II, 10-19 MIN: ICD-10-PCS | Mod: S$GLB,,, | Performed by: INTERNAL MEDICINE

## 2020-07-22 RX ORDER — HYDROCORTISONE ACETATE 25 MG/1
25 SUPPOSITORY RECTAL 2 TIMES DAILY
Qty: 20 SUPPOSITORY | Refills: 1 | Status: SHIPPED | OUTPATIENT
Start: 2020-07-22 | End: 2020-08-01

## 2020-07-22 NOTE — PATIENT INSTRUCTIONS
Hemorrhoids    Hemorrhoids are swollen and inflamed veins inside the rectum and near the anus. The rectum is the last several inches of the colon. The anus is the passage between the rectum and the outside of the body.  Causes  The veins can become swollen due to increased pressure in them. This is most often caused by:  · Chronic constipation or diarrhea  · Straining when having a bowel movement  · Sitting too long on the toilet  · A low-fiber diet  · Pregnancy  Symptoms  · Bleeding from the rectum (this may be noticeable after bowel movements)  · Lump near the anus  · Itching around the anus  · Pain around the anus  There are different types of hemorrhoids. Depending on the type you have and the severity, you may be able to treat yourself at home. In some cases, a procedure may be the best treatment option. Your healthcare provider can tell you more about this, if needed.  Home care  General care  · To get relief from pain or itching, try:  ¨ Topical products. Your healthcare provider may prescribe or recommend creams, ointments, or pads that can be applied to the hemorrhoid. Use these exactly as directed.  ¨ Medicines. Your healthcare provider may recommend stool softeners, suppositories, or laxatives to help manage constipation. Use these exactly as directed.  ¨ Sitz baths. A sitz bath involves sitting in a few inches of warm bath water. Be careful not to make the water so hot that you burn yourself--test it before sitting in it. Soak for about 10 to 15 minutes a few times a day. This may help relieve pain.  Tips to help prevent hemorrhoids  · Eat more fiber. Fiber adds bulk to stool and absorbs water as it moves through your colon. This makes stool softer and easier to pass.  ¨ Increase the fiber in your diet with more fiber-rich foods. These include fresh fruit, vegetables, and whole grains.  ¨ Take a fiber supplement or bulking agent, if advised to by your provider. These include products such as psyllium  or methylcellulose.  · Drink plenty of water, if directed to by your provider. This can help keep stool soft.  · Be more active. Frequent exercise aids digestion and helps prevent constipation. It may also help make bowel movements more regular.  · Dont strain during bowel movements. This can make hemorrhoids more likely. Also, dont sit on the toilet for long periods of time.  Follow-up care  Follow up with your healthcare provider, or as advised. If a culture or imaging tests were done, you will be notified of the results when they are ready. This may take a few days or longer.  When to seek medical advice  Call your healthcare provider right away if any of these occur:  · Increased bleeding from the rectum  · Increased pain around the rectum or anus  · Weakness or dizziness  Call 911  Call 911 or return to the emergency department right away if any of these occur:  · Trouble breathing or swallowing  · Fainting or loss of consciousness  · Unusually fast heart rate  · Vomiting blood  · Large amounts of blood in stool  Date Last Reviewed: 6/22/2015 © 2000-2017 [employer]. [address] [place] [address]. All rights reserved. This information is not intended as a substitute for professional medical care. Always follow your healthcare professional's instructions.        Treating Hemorrhoids: Self-Care    Follow your healthcare providers advice about caring for your hemorrhoids at home. Some treatments help relieve symptoms right away. Others involve making changes in your diet and exercise habits. These can help ease constipation and prevent hemorrhoid symptoms from coming back.  Relieving symptoms  Your healthcare provider may prescribe anti-inflammatory medicine to help ease your symptoms. The following tips will also help relieve pain and swelling.  · Take sitz baths. Taking a sitz bath means sitting in a few inches of warm bath water. Soaking for 10 minutes twice a day can provide  welcome relief from painful hemorrhoids. It can also help the area stay clean.  · Develop good bowel habits. Use the bathroom when you need to. Dont ignore the urge to move your bowels. This can lead to constipation, hard stools, and straining. Also, dont read while on the toilet. Sit only as long as needed. Wipe gently with soft, unscented toilet tissue or baby wipes.  · Use ice packs. Placing an ice pack on a thrombosed external hemorrhoid can help relieve pain right away. It will also help reduce the blood clot. Use the ice for 15 to 20 minutes at a time. Keep a cloth between the ice and your skin to prevent skin damage.  · Use other measures. Laxatives and enemas can help ease constipation. But use them only on your healthcare providers advice. For symptom relief, try using cotton pads soaked in witch hazel. These are available at most drugstores. Over-the-counter hemorrhoid ointments and petroleum jelly can also provide relief.  Add fiber to your diet  Adding fiber to your diet can help relieve constipation by making stools softer and easier to pass. To increase your fiber intake, your healthcare provider may recommend a bulking agent, such as psyllium. This is a high-fiber supplement available at most grocery stores and drugstores. Eating more fiber-rich foods will also help. There are two types of fiber:  · Insoluble fiber is the main ingredient in bulking agents. Its also found in foods such as wheat bran, whole-grain breads, fresh fruits, and vegetables.  · Soluble fiber is found in foods such as oat bran. Although soluble fiber is good for you, it may not ease constipation as much as foods high in insoluble fiber.  Drink more water  Along with a high-fiber diet, drinking more water can help ease constipation. This is because insoluble fiber absorbs water, making stools soft and bulky. Be sure to drink plenty of water throughout the day. Drinking fruit juices, such as prune juice or apple juice, can  also help prevent constipation.  Get more exercise  Regular exercise aids digestion and helps prevent constipation. Its also great for your health. So talk with your healthcare provider about starting an exercise program. Low-impact activities, such as swimming or walking, are good places to start. Take it easy at first. And remember to drink plenty of water when you exercise.  High-fiber foods  High-fiber foods offer many benefits. By making your stools softer, they help heal and prevent swollen hemorrhoids. They may also help reduce the risk of colon and rectal cancer. Best of all, theyre usually low in calories and taste great. Here are some examples of fiber-rich foods.  · Whole grains, such as wheat bran, corn bran, and brown rice.  · Vegetables, especially carrots, broccoli, cabbage, and peas.  · Fruits, such as apples, bananas, raisins, peaches, and pears.  · Nuts and legumes, especially peanuts, lentils, and kidney beans.  Easy ways to add fiber  The tips below offer some simple ways to add more high-fiber foods to your meals.  · Start your day with a high-fiber breakfast. Eat a wheat bran cereal along with a sliced banana. Or, try peanut butter on whole-wheat toast.  · Eat carrot sticks for snacks. Theyre easy to prepare, taste great, and are low in calories.  · Use whole-grain breads instead of white bread for sandwiches.  · Eat fruits for treats. Try an apple and some raisins instead of a candy bar.   Date Last Reviewed: 7/1/2016  © 7347-2242 [employer]. [address] [place] [address], [place] [address]. All rights reserved. This information is not intended as a substitute for professional medical care. Always follow your healthcare professional's instructions.        Understanding Hemorrhoids    Hemorrhoid tissues are cushions of blood vessels that swell slightly during bowel movements. Too much pressure on the anal canal can make these tissues remain enlarged, become inflamed, and cause  symptoms. This can happen both inside and outside the anal canal.  Parts of the anal canal  The parts of the anal canal are:  · Internal hemorrhoid tissue is in the upper area of the anal canal.  · The rectum is the last several inches of the colon. This is where stool is stored prior to bowel movements.  · Anal sphincters are ring-shaped muscles that expand and contract to control the anal opening.  · External hemorrhoid tissue lies under the anal skin.  · The anus is the passage between the rectum and the outside of the body.  Normal hemorrhoid tissue  Hemorrhoid tissues play an important role in helping your body eliminate waste. Food passes from the stomach through the intestines. The waste (stool) then travels through the colon to the rectum. It is stored in the rectum until its ready to be passed from the anus. During bowel movements, hemorrhoids swell with blood and become slightly larger. This swelling helps protect and cushion the anal canal as stool passes from the body. Once the stool has passed, the tissues stop swelling and return to normal.  Problem hemorrhoids  Pressure due to straining or other factors can cause hemorrhoid tissues to remain swollen. When this happens to the hemorrhoid tissues in the anal canal theyre called internal hemorrhoids. Swollen tissues around the anal opening are called external hemorrhoids. Depending on the location, your symptoms can differ.  · Internal hemorrhoids often happen in clusters around the wall of the anal canal. They are usually painless. But they may prolapse (protrude out of the anus) due to straining or pressure from hard stool. After the bowel movement is over, they may then reduce (return inside the body). Internal hemorrhoids often bleed. They can also discharge mucus.  ·   External hemorrhoids are located at the anal opening, just beneath the skin. These tissues rarely cause problems unless they thrombose (form a blood clot). When this happens, a hard,  bluish lump may appear. A thrombosed hemorrhoid also causes sudden, severe pain. In time, the clot may go away on its own. This sometimes leaves a skin tag of tissue stretched by the clot.  Hemorrhoid symptoms  Hemorrhoid symptoms may include:  · Pain or a burning sensation  · Bleeding during bowel movements  · Protrusion of tissue from the anus  · Itching around the anus  Causes of hemorrhoids  Theres no single cause of hemorrhoids. Most often, though, they are caused by too much pressure on the anal canal. This can be due to:  · Chronic (ongoing) constipation  · Straining during bowel movements  · Sitting too long on the toilet  · Strenuous exercise or heavy lifting  · Pregnancy and childbirth  · Aging  · Diarrhea  Date Last Reviewed: 7/1/2016  © 0061-3774 [employer]. [address] [place] [address], [place] [address]. All rights reserved. This information is not intended as a substitute for professional medical care. Always follow your healthcare professional's instructions.

## 2020-07-22 NOTE — PROGRESS NOTES
Subjective:       Patient ID: Edwin Conklin is a 59 y.o. male.    Chief Complaint: Hemorrhoids    Mr. Edwin Conklin is a 59-year-old gentleman who has been suffering from hemorrhoids for quite some time.  Recently had a flare-up for couple of weeks.  Not much of a bleeding but irritation and itching.    Bowel movements are variable sometimes good and sometimes he is constipated.    No loss and weight or weakness.  In fact he has gained 3 lb of weight since the last visit.      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      Social History     Socioeconomic History    Marital status:      Spouse name: Theresa Lucio    Number of children: Not on file    Years of education: Not on file    Highest education level: Not on file   Occupational History    Occupation: [employer]     Comment: [employer]   Social Needs    Financial resource strain: Not hard at all    Food insecurity     Worry: Never true     Inability: Never true    Transportation needs     Medical: No     Non-medical: No   Tobacco Use    Smoking status: Former Smoker     Packs/day: 0.25     Years: 0.25     Pack years: 0.06     Types: Cigarettes     Start date: 1974     Quit date: 1975     Years since quittin.5    Smokeless tobacco: Never Used   Substance and Sexual Activity    Alcohol use: Yes     Frequency: 2-3 times a week     Drinks per session: 1 or 2     Binge frequency: Less than monthly     Comment: 3x's week-beer or crown    Drug use: Never    Sexual activity: Yes     Partners: Female   Lifestyle    Physical activity     Days per week: 3 days     Minutes per session: 40 min    Stress: Not at all   Relationships    Social connections     Talks on phone: Three times a week     Gets together: Once a week      "Attends Denominational service: Not on file     Active member of club or organization: Yes     Attends meetings of clubs or organizations: 1 to 4 times per year     Relationship status:    Other Topics Concern    Not on file   Social History Narrative    Annamaria Daughter     Past Surgical History:   Procedure Laterality Date    EYE SURGERY      ROTATOR CUFF REPAIR  2/13    left shoulder    VASECTOMY       Family History   Problem Relation Age of Onset    Alzheimer's disease Father     Emphysema Sister        Review of Systems   Constitutional: Negative for activity change, diaphoresis and fever.   HENT: Negative for hearing loss and trouble swallowing.    Eyes: Negative for discharge.   Respiratory: Negative for cough, chest tightness and wheezing.    Cardiovascular: Negative for chest pain and palpitations.   Gastrointestinal: Positive for blood in stool. Negative for abdominal distention, abdominal pain, anal bleeding, constipation, diarrhea and vomiting.        Chronic hemorrhoids   Genitourinary: Negative for difficulty urinating and hematuria.   Neurological: Negative for headaches.   Psychiatric/Behavioral: Negative for dysphoric mood.         Objective:      Blood pressure 135/78, pulse 67, height 6' 1" (1.854 m), weight 94.3 kg (208 lb). Body mass index is 27.44 kg/m².  Physical Exam  Constitutional:       General: He is not in acute distress.     Appearance: He is not ill-appearing, toxic-appearing or diaphoretic.   Cardiovascular:      Rate and Rhythm: Normal rate and regular rhythm.      Heart sounds: Normal heart sounds.   Abdominal:      General: There is no distension.      Palpations: There is no mass.   Genitourinary:      Neurological:      Mental Status: He is alert.           Assessment:       1. Internal hemorrhoids           No visits with results within 3 Month(s) from this visit.   Latest known visit with results is:   Lab Visit on 03/13/2020   Component Date Value Ref Range Status    " "Testosterone, Total 03/13/2020 496  304 - 1227 ng/dL Final         Plan:           Internal hemorrhoids      Mr. Pineda mostly presents with itching and irritation.  Thankfully not much of a bleeding.  I did not see much on the outside perhaps except for a small tag.  Probably he might have some irritating internal hemorrhoids which are not bleeding at this point.    1.  I have recommended him the following-key bowel movements regular and take some Metamucil or Citrucel daily to keep it more on the mushier side.    2- use Sitz.  Or warm showers in the buttock.    3.-hydrocortisone suppositories 1 suppository twice a day and he should let me know in the next few weeks as to how he is doing.  He will keep his regular follow-up.  If symptoms persist then I will refer him to his gastroenterologist for consideration for banding or colorectal surgeon for treating "permanently" ? the hemorrhoids.    Follow-up in 3-4 or 5 months for preventive physical as per his convenience.          Current Outpatient Medications:     testosterone cypionate (DEPOTESTOTERONE CYPIONATE) 200 mg/mL injection, INJECT 1 ML INTO THE MUSCLE EVERY 28 DAYS, Disp: 10 mL, Rfl: 0    "

## 2020-08-12 ENCOUNTER — OFFICE VISIT (OUTPATIENT)
Dept: UROLOGY | Facility: CLINIC | Age: 60
End: 2020-08-12
Payer: COMMERCIAL

## 2020-08-12 VITALS
HEART RATE: 63 BPM | SYSTOLIC BLOOD PRESSURE: 115 MMHG | WEIGHT: 208 LBS | DIASTOLIC BLOOD PRESSURE: 71 MMHG | TEMPERATURE: 98 F | RESPIRATION RATE: 16 BRPM | HEIGHT: 73 IN | BODY MASS INDEX: 27.57 KG/M2

## 2020-08-12 DIAGNOSIS — R79.89 LOW SERUM TESTOSTERONE LEVEL: ICD-10-CM

## 2020-08-12 DIAGNOSIS — N40.0 BPH WITHOUT OBSTRUCTION/LOWER URINARY TRACT SYMPTOMS: Primary | ICD-10-CM

## 2020-08-12 PROCEDURE — 99214 OFFICE O/P EST MOD 30 MIN: CPT | Mod: S$GLB,,, | Performed by: UROLOGY

## 2020-08-12 PROCEDURE — 3008F BODY MASS INDEX DOCD: CPT | Mod: S$GLB,,, | Performed by: UROLOGY

## 2020-08-12 PROCEDURE — 99214 PR OFFICE/OUTPT VISIT, EST, LEVL IV, 30-39 MIN: ICD-10-PCS | Mod: S$GLB,,, | Performed by: UROLOGY

## 2020-08-12 PROCEDURE — 99999 PR PBB SHADOW E&M-EST. PATIENT-LVL III: CPT | Mod: PBBFAC,,, | Performed by: UROLOGY

## 2020-08-12 PROCEDURE — 3008F PR BODY MASS INDEX (BMI) DOCUMENTED: ICD-10-PCS | Mod: S$GLB,,, | Performed by: UROLOGY

## 2020-08-12 PROCEDURE — 99999 PR PBB SHADOW E&M-EST. PATIENT-LVL III: ICD-10-PCS | Mod: PBBFAC,,, | Performed by: UROLOGY

## 2020-08-12 RX ORDER — TESTOSTERONE CYPIONATE 200 MG/ML
INJECTION, SOLUTION INTRAMUSCULAR
Qty: 10 ML | Refills: 0 | Status: SHIPPED | OUTPATIENT
Start: 2020-08-12 | End: 2021-03-19 | Stop reason: SDUPTHER

## 2020-08-12 NOTE — PROGRESS NOTES
Subjective:       Patient ID: Edwin Conklin is a 60 y.o. male.    Chief Complaint:   BPH.  History of low serum testosterone on testosterone replacement.    HPI   Mr. Conklin is a 60-year-old male with history of mild BPH at the present time taking no medication.  The patient here for his annual prostate evaluation and re-evaluation of his testosterone replacement therapy.    The patient referred that have nocturia x1.  Denies dysuria.  Denies hematuria.  The flow is adequate most of the time.  Changes the size of the flow frequently.  He feels like he empties the bladder satisfactory.  He feels that the current testosterone replacement therapy is satisfactory he feels fine with in of energy and sexual libido within normal limits.  The family history is negative for prostate cancer.    He is to be noted the patient is taking 100 mg of the patella.  Sterile every 2 weeks self administered.    The past medical and surgical history are well documented in the EHR and all these were reviewed by me during this visit.  Is to be noted that have not changed since the last visit to our office on January 30, 2020.    Review of Systems   Constitutional: Negative for activity change and appetite change.   HENT: Negative.    Eyes: Negative for discharge.   Respiratory: Negative for cough and shortness of breath.    Cardiovascular: Negative for chest pain and palpitations.   Gastrointestinal: Negative for abdominal distention, abdominal pain, constipation and vomiting.   Genitourinary: Negative for discharge, dysuria, flank pain, frequency, hematuria, testicular pain and urgency.   Musculoskeletal: Negative for arthralgias.   Skin: Negative for rash.   Neurological: Negative for dizziness.   Psychiatric/Behavioral: The patient is not nervous/anxious.          Objective:      Physical Exam   Constitutional: He appears well-developed.   HENT:   Head: Normocephalic.   Eyes: Pupils are equal, round, and reactive to light.    Neck: Normal range of motion.   Cardiovascular: Normal rate.    Pulmonary/Chest: Effort normal.   Abdominal: Soft. He exhibits no distension and no mass. There is no abdominal tenderness.   Genitourinary:    Prostate, penis and rectum normal.   Rectum:      No rectal mass, tenderness or external hemorrhoid.   Prostate is not enlarged and not tender. Right testis shows no mass and no tenderness. Left testis shows no mass and no tenderness. No discharge found.       Musculoskeletal: Normal range of motion.   Neurological: He is alert.   Skin: Skin is warm.         Assessment:       1. BPH without obstruction/lower urinary tract symptoms    2. Low serum testosterone level        Plan:       BPH without obstruction/lower urinary tract symptoms    Low serum testosterone level  -     testosterone cypionate (DEPOTESTOTERONE CYPIONATE) 200 mg/mL injection; INJECT 1 ML INTO THE MUSCLE EVERY 28 DAYS  Dispense: 10 mL; Refill: 0  -     Prostate Specific Antigen, Diagnostic; Future; Expected date: 11/12/2020      PSA to be done in November 2020.  Patient will be informed of the results of that test.  We are going to keep injecting testosterone 0.5 cc every 2 weeks IM RTC in 1 year

## 2020-08-12 NOTE — LETTER
August 12, 2020      Jerel Earl MD  [address] [facility]  [address]  [facility] [address]           [facility]  [address] [place] [address]  Phone: [phone]  Fax: [phone]          Patient: Edwin Conklin   MR Number: [id number]   YOB: 1960   Date of Visit: 8/12/2020       Dear Dr. Jerel Earl:    Thank you for referring Edwin Conklin to me for evaluation. Attached you will find relevant portions of my assessment and plan of care.    If you have questions, please do not hesitate to call me. I look forward to following Edwin Conklin along with you.    Sincerely,    Jason Hernandez MD    Enclosure  CC:  No Recipients    If you would like to receive this communication electronically, please contact [email or web address] or [phone] to request more information on [facility] [facility] [employer] access.    For providers and/or their staff who would like to refer a patient to [facility], please contact us through our one-stop-shop provider referral line, [facility], at [phone].    If you feel you have received this communication in error or would no longer like to receive these types of communications, please e-mail [email or web address]

## 2020-08-31 ENCOUNTER — OFFICE VISIT (OUTPATIENT)
Dept: FAMILY MEDICINE | Facility: CLINIC | Age: 60
End: 2020-08-31
Payer: COMMERCIAL

## 2020-08-31 VITALS
BODY MASS INDEX: 27.7 KG/M2 | HEART RATE: 64 BPM | SYSTOLIC BLOOD PRESSURE: 121 MMHG | WEIGHT: 209 LBS | HEIGHT: 73 IN | DIASTOLIC BLOOD PRESSURE: 73 MMHG | TEMPERATURE: 97 F

## 2020-08-31 DIAGNOSIS — R21 RASH: Primary | ICD-10-CM

## 2020-08-31 DIAGNOSIS — L50.9 URTICARIA: ICD-10-CM

## 2020-08-31 DIAGNOSIS — L29.9 ITCHING: ICD-10-CM

## 2020-08-31 PROCEDURE — 99214 PR OFFICE/OUTPT VISIT, EST, LEVL IV, 30-39 MIN: ICD-10-PCS | Mod: S$GLB,,, | Performed by: INTERNAL MEDICINE

## 2020-08-31 PROCEDURE — 3008F BODY MASS INDEX DOCD: CPT | Mod: S$GLB,,, | Performed by: INTERNAL MEDICINE

## 2020-08-31 PROCEDURE — 99214 OFFICE O/P EST MOD 30 MIN: CPT | Mod: S$GLB,,, | Performed by: INTERNAL MEDICINE

## 2020-08-31 PROCEDURE — 3008F PR BODY MASS INDEX (BMI) DOCUMENTED: ICD-10-PCS | Mod: S$GLB,,, | Performed by: INTERNAL MEDICINE

## 2020-08-31 RX ORDER — FAMOTIDINE 20 MG/1
20 TABLET, FILM COATED ORAL 2 TIMES DAILY
Qty: 60 TABLET | Refills: 2 | Status: SHIPPED | OUTPATIENT
Start: 2020-08-31 | End: 2021-01-14

## 2020-08-31 RX ORDER — MONTELUKAST SODIUM 10 MG/1
10 TABLET ORAL NIGHTLY
Qty: 30 TABLET | Refills: 2 | Status: SHIPPED | OUTPATIENT
Start: 2020-08-31 | End: 2020-09-30

## 2020-08-31 NOTE — PATIENT INSTRUCTIONS
Hives (Adult)  Hives are pink or red bumps on the skin. These bumps are also known as wheals. The bumps can itch, burn, or sting. Hives can occur anywhere on the body. They vary in size and shape and can form in clusters. Individual hives can appear and go away quickly. New hives may develop as old ones fade. Hives are common and usually harmless. Occasionally hives are a sign of a serious allergy.  Hives are often caused by an allergic reaction. It may be an allergic reaction to foods such as fruit, shellfish, chocolate, nuts, or tomatoes. It may be a reaction to pollens, animal fur, or mold spores. Medicines, chemicals, and insect bites can also cause hives. And hives can be caused by hot sun or cold air. The cause of hives can be difficult to find.  You may be given medicines to relieve swelling and itching. Follow all instructions when using these medicines. The hives will usually fade in a few days, but can last up to 2 weeks.  Home care  Follow these tips:  · Try to find the cause of the hives and eliminate it. Discuss possible causes with your healthcare provider. Future reactions to the same allergen may be worse.  · Dont scratch the hives. Scratching will delay healing. To reduce itching, apply cool, wet compresses to the skin.  · Dress in soft, loose cotton clothing.  · Dont bathe in hot water. This can make the itching worse.  · Apply an ice pack or cool pack wrapped in a thin towel to your skin. This will help reduce redness and itching. But if your hives were caused by exposure to cold, then do not apply more cold to them.  · You may use over-the counter antihistamines to reduce itching. Some older antihistamines, such as diphenhydramine and chlorpheniramine, are inexpensive. But they need to be taken often and may make you sleepy. They are best used at bedtime. Dont use diphenhydramine if you have glaucoma or have trouble urinating because of an enlarged prostate. Newer antihistamines, such as  loratadine, cetirizine, and fexofenadine, are generally more expensive. But they tend to have fewer side effects, such as drowsiness. They can be taken less often.  · Another type of antihistamine is used to treat heartburn. This type includes ranitidine, nizatidine, famotidine, and cimetidine. These are sometimes used along with the above antihistamines if a single medicine is not working.  Follow-up care  Follow up with your healthcare provider if your symptoms don't get better in 2 days. Ask your provider about allergy testing if you have had a severe reaction, or have had several episodes of hives. He or she can use the allergy testing to find out what you are allergic to.  When to seek medical advice  Call your healthcare provider right away if any of these occur:  · Fever of 100.4°F (38.0°C) or higher, or as directed by your healthcare provider  · Redness, swelling, or pain  · Foul-smelling fluid coming from the rash  Call 911  Call 911 if any of the following occur:  · Swelling of the face, throat, or tongue  · Trouble breathing or swallowing  · Dizziness, weakness, or fainting  Date Last Reviewed: 9/1/2016  © 5611-2780 [employer]. [address] [place] [address], [place] [id number]. All rights reserved. This information is not intended as a substitute for professional medical care. Always follow your healthcare professional's instructions.        Self-Care for Skin Rashes     Pat your skin dry. Do not rub.     When your skin reacts to a substance your body is sensitive to, it can cause a rash. You can treat most rashes at home by keeping the skin clean and dry. Many rashes may get better on their own within 2 to 3 days. You may need medical attention if your rash itches, drains, or hurts, particularly if the rash is getting worse.  What can cause a skin rash?  · Sun poisoning, caused by too much exposure to the sun  · An irritant or allergic reaction to a certain type of food, plant, or chemical, such  as  shellfish, poison ivy, and or cleaning products  · An infection caused by a fungus (ringworm), virus (chickenpox), or bacteria (strep)  · Bites or infestation caused by insects or pests, such as ticks, lice, or mites  · Dry skin, which is often seen during the winter months and in older people  How can I control itching and skin damage?  · Take soothing lukewarm baths in a colloidal oatmeal product. You can buy this at the [employer].  · Do your best not to scratch. Clip fingernails short, especially in young children, to reduce skin damage if scratching does occur.  · Use moisturizing skin lotion instead of scratching your dry skin.  · Use sunscreen whenever going out into direct sun.  · Use only mild cleansing agents whenever possible.  · Wash with mild, nonirritating soap and warm water.  · Wear clothing that breathes, such as cotton shirts or canvas shoes.  · If fluid is seeping from the rash, cover it loosely with clean gauze to absorb the discharge.  · Many rashes are contagious. Prevent the rash from spreading to others by washing your hands often before or after touching others with any skin rash.  Use medicine  · Antihistamines such as diphenhydramine can help control itching. But use with caution because they can make you drowsy.  · Using over-the-counter hydrocortisone cream on small rashes may help reduce swelling and itching  · Most over-the-counter antifungal medicines can treat athletes foot and many other fungal infections of the skin.  Check with your healthcare provider  Call your healthcare provider if:  · You were told that you have a fungal infection on your skin to make sure you have the correct type of medicine.  · You have questions or concerns about medicines or their side effects.     Call 911  Call 911 if either of these occur:  · Your tongue or lips start to swell  · You have difficulty breathing      Call your healthcare provider  Call your healthcare provider if any of these  occur:  · Temperature of more than 101.0°F (38.3°C), or as directed  · Sore throat, a cough, or unusual fatigue  · Red, oozy, or painful rash gets worse. These are signs of infection.  · Rash covers your face, genitals, or most of your body  · Crusty sores or red rings that begin to spread  · You were exposed to someone who has a contagious rash, such as scabies or lice.  · Red bulls-eye rash with a white center (a sign of Lyme disease)  · You were told that you have resistant bacteria (MRSA) on your skin.   Date Last Reviewed: 5/12/2015  © 1958-8877 [employer] [address] [place] [address], [place] [address]. All rights reserved. This information is not intended as a substitute for professional medical care. Always follow your healthcare professional's instructions.

## 2020-08-31 NOTE — PROGRESS NOTES
Subjective:       Patient ID: Edwin Conklin is a 60 y.o. male.    Chief Complaint: Rash (whole body )    Mr. Pineda is a 60-year-old gentleman who comes for follow-up.  More than a year or so he has been experiencing on and off rash on his body and torso.  He had been seen by a previous primary care physician and also by dermatologist who opined that he might have allergic reaction to either some food or perhaps some soap exposure.  He has try to eliminate as much as possible but with no relief.    Today he comes with complains of significant itching especially towards nighttime at his back, at his waist, at his trunk line, at his genitalia and in his upper thighs and perhaps arms also.  There is no associated fever.    Limited insight as to what could be causing this rash as far as food or exposure is concerned.  He does eat all forms of food including seafood, peanuts but cannot specifically pinpoint if anyone of them is causing by method of trial elimination.    No other family member has rash.    He does have hypogonadism for which he takes testosterone.  He has not had a trial of stopping testosterone and seeing if stopping that medication reduces his rash.    Rash  This is a recurrent problem. The current episode started more than 1 year ago. The problem has been waxing and waning since onset. The affected locations include the abdomen, torso, back, right upper leg and left upper leg. The rash is characterized by itchiness.       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      Social History     Socioeconomic History    Marital status:      Spouse name: Theresa Lucio    Number of children: 2    Years of education: Not on file    Highest education level: Not on file   Occupational History     "Occupation: [employer]     Comment: [employer]   Social Needs    Financial resource strain: Not hard at all    Food insecurity     Worry: Never true     Inability: Never true    Transportation needs     Medical: No     Non-medical: No   Tobacco Use    Smoking status: Former Smoker     Packs/day: 0.25     Years: 0.25     Pack years: 0.06     Types: Cigarettes     Start date: 1974     Quit date: 1975     Years since quittin.6    Smokeless tobacco: Never Used   Substance and Sexual Activity    Alcohol use: Yes     Frequency: 2-3 times a week     Drinks per session: 1 or 2     Binge frequency: Less than monthly     Comment: 3x's week-beer or crown    Drug use: Never    Sexual activity: Yes     Partners: Female   Lifestyle    Physical activity     Days per week: 3 days     Minutes per session: 40 min    Stress: Not at all   Relationships    Social connections     Talks on phone: Three times a week     Gets together: Once a week     Attends Gnosticism service: Not on file     Active member of club or organization: Yes     Attends meetings of clubs or organizations: 1 to 4 times per year     Relationship status:    Other Topics Concern    Not on file   Social History Narrative    Annamaria Daughter    Janelle Daughter     Past Surgical History:   Procedure Laterality Date    EYE SURGERY      ROTATOR CUFF REPAIR      left shoulder    VASECTOMY       Family History   Problem Relation Age of Onset    Alzheimer's disease Father     Stroke Father     Dementia Mother     Emphysema Sister     COPD Sister         ex smoker    No Known Problems Brother        Review of Systems   Skin: Positive for rash.         Objective:      Blood pressure 121/73, pulse 64, temperature 97.2 °F (36.2 °C), height 6' 1" (1.854 m), weight 94.8 kg (209 lb). Body mass index is 27.57 kg/m².  Physical Exam      This panel shows a slightly wheal like elevation.      Examination of the back did not show any " specific lesions or rash  Assessment:       1. Rash    2. Itching           No visits with results within 3 Month(s) from this visit.   Latest known visit with results is:   Lab Visit on 03/13/2020   Component Date Value Ref Range Status    Testosterone, Total 03/13/2020 496  304 - 1227 ng/dL Final         Plan:           Rash    Itching     At this point the only impressive finding is on his left baseline which seems to be a whelps or wheal like elevation.  Please see the picture above.    He has been taking testosterone for years without any problems and he does not believe that this medication is causing him any problems.    As far as food and diet is concerned I will recommend him the following.    1.-elimination of dairy products for 1 week to 2 weeks and see how he does.  He should stop taking milk, cheese, butter or yogurt.    2.-if no relief.  Wheat products leg gluten.  See if any relief.    I will do a set of labs to be sure that we are not missing any underlying immunological or rheumatological condition.  Check CBC, CMP, sed rate, C-reactive protein and lupus screening.    He was screen negative for hepatitis C in past.  He does not believe himself to be at risk for HIV.    Take cool showers and not hot showers.  He does have a dog at home but he has had this dog for several years.    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      I will notify him the results of the lab results and take it from there.    I will treat him with Pepcid 20 mg twice a day and also montelukast 10 mg per day.    Further management will depend upon results of labs and response to initial intervention.  I had a long discussion with the patient concerning these type of rashes, causative factors, usual trajectory and possibility of involvement of allergist/dermatologist.    Spent horacio 25 minutes with patient which involved review of pts medical conditions, labs, medications and with 50%  of time face-to-face discussion about medical problems, management and any applicable changes.        Current Outpatient Medications:     testosterone cypionate (DEPOTESTOTERONE CYPIONATE) 200 mg/mL injection, INJECT 1 ML INTO THE MUSCLE EVERY 28 DAYS, Disp: 10 mL, Rfl: 0

## 2020-09-03 ENCOUNTER — PATIENT MESSAGE (OUTPATIENT)
Dept: FAMILY MEDICINE | Facility: CLINIC | Age: 60
End: 2020-09-03

## 2020-09-03 DIAGNOSIS — L29.9 ITCHING: ICD-10-CM

## 2020-09-03 DIAGNOSIS — L50.9 URTICARIA: ICD-10-CM

## 2020-09-03 DIAGNOSIS — R21 RASH: Primary | ICD-10-CM

## 2020-09-03 LAB
ALBUMIN SERPL-MCNC: 4 G/DL (ref 3.6–5.1)
ALBUMIN/GLOB SERPL: 1.3 (CALC) (ref 1–2.5)
ALP SERPL-CCNC: 42 U/L (ref 35–144)
ALT SERPL-CCNC: 22 U/L (ref 9–46)
ANA SER QL IF: NEGATIVE
AST SERPL-CCNC: 16 U/L (ref 10–35)
BASOPHILS # BLD AUTO: 62 CELLS/UL (ref 0–200)
BASOPHILS NFR BLD AUTO: 1 %
BILIRUB SERPL-MCNC: 0.6 MG/DL (ref 0.2–1.2)
BUN SERPL-MCNC: 13 MG/DL (ref 7–25)
BUN/CREAT SERPL: NORMAL (CALC) (ref 6–22)
CALCIUM SERPL-MCNC: 9.3 MG/DL (ref 8.6–10.3)
CHLORIDE SERPL-SCNC: 103 MMOL/L (ref 98–110)
CO2 SERPL-SCNC: 27 MMOL/L (ref 20–32)
CREAT SERPL-MCNC: 1.09 MG/DL (ref 0.7–1.25)
CRP SERPL-MCNC: 2.5 MG/L
EOSINOPHIL # BLD AUTO: 384 CELLS/UL (ref 15–500)
EOSINOPHIL NFR BLD AUTO: 6.2 %
ERYTHROCYTE [DISTWIDTH] IN BLOOD BY AUTOMATED COUNT: 14 % (ref 11–15)
ERYTHROCYTE [SEDIMENTATION RATE] IN BLOOD BY WESTERGREN METHOD: 2 MM/H
GFRSERPLBLD MDRD-ARVRAT: 73 ML/MIN/1.73M2
GLOBULIN SER CALC-MCNC: 3.2 G/DL (CALC) (ref 1.9–3.7)
GLUCOSE SERPL-MCNC: 94 MG/DL (ref 65–99)
HCT VFR BLD AUTO: 47.5 % (ref 38.5–50)
HGB BLD-MCNC: 16.1 G/DL (ref 13.2–17.1)
LYMPHOCYTES # BLD AUTO: 1513 CELLS/UL (ref 850–3900)
LYMPHOCYTES NFR BLD AUTO: 24.4 %
MCH RBC QN AUTO: 30.6 PG (ref 27–33)
MCHC RBC AUTO-ENTMCNC: 33.9 G/DL (ref 32–36)
MCV RBC AUTO: 90.1 FL (ref 80–100)
MONOCYTES # BLD AUTO: 663 CELLS/UL (ref 200–950)
MONOCYTES NFR BLD AUTO: 10.7 %
NEUTROPHILS # BLD AUTO: 3577 CELLS/UL (ref 1500–7800)
NEUTROPHILS NFR BLD AUTO: 57.7 %
PLATELET # BLD AUTO: 193 THOUSAND/UL (ref 140–400)
PMV BLD REES-ECKER: 9.6 FL (ref 7.5–12.5)
POTASSIUM SERPL-SCNC: 4.5 MMOL/L (ref 3.5–5.3)
PROT SERPL-MCNC: 7.2 G/DL (ref 6.1–8.1)
RBC # BLD AUTO: 5.27 MILLION/UL (ref 4.2–5.8)
SODIUM SERPL-SCNC: 139 MMOL/L (ref 135–146)
WBC # BLD AUTO: 6.2 THOUSAND/UL (ref 3.8–10.8)

## 2020-09-08 NOTE — TELEPHONE ENCOUNTER
Called pt to schedule appt. States he is doing a little better. Did not want to schedule appt for right now. Scheduled appt 9/29, in case it doesn't completely clear up he would have an appt on the book. Verbalized understanding.

## 2020-09-09 ENCOUNTER — PATIENT MESSAGE (OUTPATIENT)
Dept: DERMATOLOGY | Facility: CLINIC | Age: 60
End: 2020-09-09

## 2020-10-01 ENCOUNTER — OFFICE VISIT (OUTPATIENT)
Dept: DERMATOLOGY | Facility: CLINIC | Age: 60
End: 2020-10-01
Payer: COMMERCIAL

## 2020-10-01 VITALS — HEIGHT: 73 IN | BODY MASS INDEX: 27.7 KG/M2 | WEIGHT: 209 LBS

## 2020-10-01 DIAGNOSIS — L98.9 DISEASE OF SKIN AND SUBCUTANEOUS TISSUE: Primary | ICD-10-CM

## 2020-10-01 DIAGNOSIS — D18.01 CHERRY ANGIOMA: ICD-10-CM

## 2020-10-01 DIAGNOSIS — L57.0 ACTINIC KERATOSES: ICD-10-CM

## 2020-10-01 DIAGNOSIS — Z12.83 SKIN CANCER SCREENING: ICD-10-CM

## 2020-10-01 DIAGNOSIS — D48.5 NEOPLASM OF UNCERTAIN BEHAVIOR OF SKIN: ICD-10-CM

## 2020-10-01 DIAGNOSIS — L82.1 SEBORRHEIC KERATOSES: ICD-10-CM

## 2020-10-01 PROCEDURE — 3008F PR BODY MASS INDEX (BMI) DOCUMENTED: ICD-10-PCS | Mod: CPTII,S$GLB,, | Performed by: DERMATOLOGY

## 2020-10-01 PROCEDURE — 3008F BODY MASS INDEX DOCD: CPT | Mod: CPTII,S$GLB,, | Performed by: DERMATOLOGY

## 2020-10-01 PROCEDURE — 99999 PR PBB SHADOW E&M-EST. PATIENT-LVL III: CPT | Mod: PBBFAC,,, | Performed by: DERMATOLOGY

## 2020-10-01 PROCEDURE — 99203 OFFICE O/P NEW LOW 30 MIN: CPT | Mod: S$GLB,,, | Performed by: DERMATOLOGY

## 2020-10-01 PROCEDURE — 99999 PR PBB SHADOW E&M-EST. PATIENT-LVL III: ICD-10-PCS | Mod: PBBFAC,,, | Performed by: DERMATOLOGY

## 2020-10-01 PROCEDURE — 99203 PR OFFICE/OUTPT VISIT, NEW, LEVL III, 30-44 MIN: ICD-10-PCS | Mod: S$GLB,,, | Performed by: DERMATOLOGY

## 2020-10-01 RX ORDER — TRIAMCINOLONE ACETONIDE 1 MG/G
CREAM TOPICAL
Qty: 80 G | Refills: 3 | Status: SHIPPED | OUTPATIENT
Start: 2020-10-01 | End: 2021-01-14

## 2020-10-01 NOTE — LETTER
October 1, 2020      Jerel Earl MD  [address] [facility]  [address]  [facility] [address]           [address] [address]  Phone: [phone]          Patient: Edwin Conklin   MR Number: [id number]   YOB: 1960   Date of Visit: 10/1/2020       Dear Dr. Jerel Earl:    Thank you for referring Edwin Conklin to me for evaluation. Attached you will find relevant portions of my assessment and plan of care.    If you have questions, please do not hesitate to call me. I look forward to following Edwin Conklin along with you.    Sincerely,    Shaye Jacob MD    Enclosure  CC:  No Recipients    If you would like to receive this communication electronically, please contact [email or web address] or [phone] to request more information on [facility] Link access.    For providers and/or their staff who would like to refer a patient to [facility], please contact us through our one-stop-shop provider referral line, [facility], at [phone].    If you feel you have received this communication in error or would no longer like to receive these types of communications, please e-mail [email or web address]

## 2020-10-01 NOTE — PROGRESS NOTES
Subjective:       Patient ID:  Edwin Conklin is a 60 y.o. male who presents for   Chief Complaint   Patient presents with    Rash    Spot    Skin Check     New patient    Patient here today with c/o a rash on hands and torso that has been going on for about one year, comes and goes, bumpy and itchy, uses Benadryl cream, was given Pepcid and Singular by Dr Earl, has improved some but not totally gone.  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   Respiratory: Negative for cough and shortness of breath.    Skin: Positive for itching, rash and wears hat. Negative for daily sunscreen use and activity-related sunscreen use.   Hematologic/Lymphatic: Does not bruise/bleed easily.        Objective:    Physical Exam   Constitutional: He appears well-developed and well-nourished. No distress.   Neurological: He is alert and oriented to person, place, and time. He is not disoriented.   Psychiatric: He has a normal mood and affect.   Skin:   Areas Examined (abnormalities noted in diagram):   Scalp / Hair Palpated and Inspected  Head / Face Inspection Performed  Neck Inspection Performed  Chest / Axilla Inspection Performed  Abdomen Inspection Performed  Genitals / Buttocks / Groin Inspection Performed  Back Inspection Performed  RUE Inspected  LUE Inspection Performed  RLE Inspected  LLE  Inspection Performed  Nails and Digits Inspection Performed                   Diagram Legend     Erythematous scaling macule/papule c/w actinic keratosis       Vascular papule c/w angioma      Pigmented verrucoid papule/plaque c/w seborrheic keratosis      Yellow umbilicated papule c/w sebaceous hyperplasia      Irregularly shaped tan macule c/w lentigo     1-2 mm smooth white papules consistent with Milia      Movable subcutaneous cyst with punctum c/w epidermal inclusion cyst      Subcutaneous movable cyst c/w pilar cyst      Firm pink to brown papule c/w dermatofibroma      Pedunculated fleshy papule(s) c/w skin tag(s)      Evenly pigmented macule c/w junctional nevus     Mildly variegated pigmented, slightly irregular-bordered macule c/w mildly atypical nevus      Flesh colored to evenly pigmented papule c/w intradermal nevus       Pink pearly papule/plaque c/w basal cell carcinoma      Erythematous hyperkeratotic cursted plaque c/w SCC      Surgical scar with no sign of skin cancer recurrence      Open and closed comedones      Inflammatory papules and pustules      Verrucoid papule consistent consistent with wart     Erythematous eczematous patches and plaques     Dystrophic onycholytic nail with subungual debris c/w onychomycosis     Umbilicated papule    Erythematous-base heme-crusted tan verrucoid plaque consistent with inflamed seborrheic keratosis     Erythematous Silvery Scaling Plaque c/w Psoriasis     See annotation      Assessment / Plan:       AK's  sclp  Cryosurgery Procedure Note    Verbal consent from the patient is obtained and the patient is aware of the precancerous quality and need for treatment of these lesions. Liquid nitrogen cryosurgery is applied to the 2 actinic keratoses, as detailed in the physical exam, to produce a freeze injury. The patient is aware that blisters may form and is instructed on wound care with gentle cleansing and use of vaseline ointment to keep moist until healed.  The patient is supplied a handout on cryosurgery and is instructed to call if lesions do not completely resolve.    Disease of skin and subcutaneous tissue  -     triamcinolone acetonide 0.1% (KENALOG) 0.1 % cream; AAA bid prn flare  Dispense: 80 g; Refill: 3  Eczematous, mild today  cerave lotion after shower to bid  Continue sensitive skin routine  Hyperlinear palms    Neoplasm of uncertain behavior of skin  R lower flank  DF vs other  Plan excisional biopsy    Seborrheic keratoses  These are benign inherited growths without a malignant potential. Reassurance given to patient. No treatment is necessary.     Cherry angioma  This is a benign vascular lesion. Reassurance given. No treatment required.     Skin cancer screening  Total body skin examination performed today including at least 12 points as noted in physical examination.    Patient instructed in importance in daily sun protection of at least spf 30. Mineral sunscreen ingredients preferred (Zinc +/- Titanium).   Recommend Elta MD for daily use on face and neck.  Patient encouraged to wear hat for all outdoor exposure.   Also discussed sun avoidance and use of protective clothing.             Follow up if symptoms worsen or fail to improve.

## 2020-10-01 NOTE — PATIENT INSTRUCTIONS
XEROSIS (DRY SKIN)    Xerosis is the term for dry skin.  We all have a natural oil coating over our skin produced by the skin oil glands.  If this oil is removed, the skin becomes dry which can lead to cracking, which can lead to inflammation. Xerosis is usually a long-term problem that recurs often, especially in the winter.    1. Cause     Long hot baths or showers can remove our natural oil and lead to xerosis.  One should never take more than one bath or shower a day and for no longer than ten minutes.   Use of harsh soaps such as Zest, Dial, and Ivory can worsen and cause xerosis.   Cold winter weather worsens xerosis because the amount of moisture contained in cold air is much less than the amount of moisture in warm air.    2. Treatment     Treatment is intended to restore the natural oil to your skin.  Keep the skin lubricated.     Do not take more than one bath or shower a day.  Use lukewarm water, not hot.  Hot water dries out the skin.     Use a gentle moisturizing soap such as Cetaphil soap, cerave gentle cleanser, Oil of Olay, Dove sensitive bar, Basis, Ivory moisture care, Restoraderm cleanser.     When toweling dry, dont rub.  Blot the skin so there is still some water left on the skin.  Immediately after toweling, you should apply a moisturizing cream from neck to toes such as Cerave cream, Cetaphil cream, Restoraderm or Eucerin Original Formula cream.  Alpha hydroxyacid lotions, i.e., AmLactin or Cerave SA, also work very well for preventing dry skin, but may burn when used on inflamed or reddened skin.     If you like to swim during the winter months, you should not use soap when getting out of the pool.  When you have finished swimming, rinse off the chlorine with cool to warm water.  If this will be the only shower of the day, then you may use Cetaphil or another mild soap to cleanse your skin.  After the shower, apply a moisturizing cream to all of the skin as above.    3. Additional  recommendation:      Use unscented hypoallergenic detergent for your clothes, avoid softener or dryer sheets unless they are unscented and hypoallergenic (all free and clear; downy free and gentle).    [facility]; [address] [place] [address] Tel: [phone] Fax: [phone]

## 2020-10-02 ENCOUNTER — TELEPHONE (OUTPATIENT)
Dept: DERMATOLOGY | Facility: CLINIC | Age: 60
End: 2020-10-02

## 2020-10-02 NOTE — TELEPHONE ENCOUNTER
Returned pt' call. Looking to schedule excisional biopsy of DF vs other on right flank. Pt requesting 10/14 or 10/22. Informed Dr Jacob is off on those days. Pt states he will call back to schedule the beginning of Nov when he knows his work schedule.

## 2020-10-02 NOTE — TELEPHONE ENCOUNTER
----- Message from Gab Moore sent at 10/2/2020 12:24 PM CDT -----  Regarding: pt  Type: Needs Medical Advice    Who Called:  pt    Best Call Back Number: [phone]   Additional Information: pt needs to schedule procedure with Dr Jacob. Victoria call to assist.

## 2020-10-05 ENCOUNTER — TELEPHONE (OUTPATIENT)
Dept: DERMATOLOGY | Facility: CLINIC | Age: 60
End: 2020-10-05

## 2020-10-05 NOTE — TELEPHONE ENCOUNTER
----- Message from Makenzie Antonio sent at 10/5/2020 12:54 PM CDT -----  Contact: [phone]/Self  Patient is requesting to speak with nurse to schedule a procedure. Please advise.

## 2020-10-27 ENCOUNTER — PROCEDURE VISIT (OUTPATIENT)
Dept: DERMATOLOGY | Facility: CLINIC | Age: 60
End: 2020-10-27
Payer: COMMERCIAL

## 2020-10-27 DIAGNOSIS — D48.5 NEOPLASM OF UNCERTAIN BEHAVIOR OF SKIN: Primary | ICD-10-CM

## 2020-10-27 PROCEDURE — 12032 PR LAYR CLOS WND TRUNK,ARM,LEG 2.6-7.5 CM: ICD-10-PCS | Mod: S$GLB,,, | Performed by: DERMATOLOGY

## 2020-10-27 PROCEDURE — 12032 INTMD RPR S/A/T/EXT 2.6-7.5: CPT | Mod: S$GLB,,, | Performed by: DERMATOLOGY

## 2020-10-27 PROCEDURE — 99499 UNLISTED E&M SERVICE: CPT | Mod: S$GLB,,, | Performed by: DERMATOLOGY

## 2020-10-27 PROCEDURE — 88305 TISSUE EXAM BY PATHOLOGIST: CPT | Mod: 26,,, | Performed by: PATHOLOGY

## 2020-10-27 PROCEDURE — 11402 PR EXC SKIN BENIG 1.1-2 CM TRUNK,ARM,LEG: ICD-10-PCS | Mod: 51,S$GLB,, | Performed by: DERMATOLOGY

## 2020-10-27 PROCEDURE — 88305 TISSUE EXAM BY PATHOLOGIST: CPT | Performed by: PATHOLOGY

## 2020-10-27 PROCEDURE — 11402 EXC TR-EXT B9+MARG 1.1-2 CM: CPT | Mod: 51,S$GLB,, | Performed by: DERMATOLOGY

## 2020-10-27 PROCEDURE — 99499 NO LOS: ICD-10-PCS | Mod: S$GLB,,, | Performed by: DERMATOLOGY

## 2020-10-27 PROCEDURE — 88305 TISSUE EXAM BY PATHOLOGIST: ICD-10-PCS | Mod: 26,,, | Performed by: PATHOLOGY

## 2020-10-27 NOTE — PATIENT INSTRUCTIONS
Surgery Wound Care    Your doctor has performed an excision today.  A bandage and vaseline ointment has been placed over the site.  This should remain in place for 24 hours.  It is recommended that you keep the area dry for the first 24 hours.  After 24 hours, you may remove the band aid and wash the area with warm soap and water and apply Vaseline jelly or aquaphore.  Many patients prefer to use Neosporin or Bacitracin ointment.  This is acceptable; however know that you can develop an allergy to this medication even if you have used it safely for years.  It is important to keep the area moist.  Letting it dry out and get air slows healing time, will worsen the scar, and make it more difficult to remove the stitches if they were placed.        If you notice increasing redness, tenderness, pain, or yellow drainage at the biopsy or surgical site, please notify your doctor.  These are signs of an infection.    If your biopsy/surgical site is bleeding, apply firm pressure for 15 minutes straight.  Repeat for another 15 minutes, if it is still bleeding.   If the surgical site continues to bleed, then please contact your doctor.     [facility]  [facility]  [address] [place] [id number]  Dept: [phone]

## 2020-10-27 NOTE — PROGRESS NOTES
Subjective:       Patient ID:  Edwin Conklin is a 60 y.o. male who presents for   Chief Complaint   Patient presents with    Pre-op Exam     Patient here for excisional biopsy of a NUB, R upper hip x several months  Feeling well today.   Denies pacemaker, defibrillator or blood thinners.   No additional cutaneous complaints.         Review of Systems   Constitutional: Negative for fever and chills.        Objective:    Physical Exam   Constitutional: He appears well-developed and well-nourished. No distress.   Neurological: He is alert and oriented to person, place, and time. He is not disoriented.   Psychiatric: He has a normal mood and affect.   Skin:   Areas Examined (abnormalities noted in diagram):   Abdomen Inspection Performed  Back Inspection Performed              Diagram Legend     Erythematous scaling macule/papule c/w actinic keratosis       Vascular papule c/w angioma      Pigmented verrucoid papule/plaque c/w seborrheic keratosis      Yellow umbilicated papule c/w sebaceous hyperplasia      Irregularly shaped tan macule c/w lentigo     1-2 mm smooth white papules consistent with Milia      Movable subcutaneous cyst with punctum c/w epidermal inclusion cyst      Subcutaneous movable cyst c/w pilar cyst      Firm pink to brown papule c/w dermatofibroma      Pedunculated fleshy papule(s) c/w skin tag(s)      Evenly pigmented macule c/w junctional nevus     Mildly variegated pigmented, slightly irregular-bordered macule c/w mildly atypical nevus      Flesh colored to evenly pigmented papule c/w intradermal nevus       Pink pearly papule/plaque c/w basal cell carcinoma      Erythematous hyperkeratotic cursted plaque c/w SCC      Surgical scar with no sign of skin cancer recurrence      Open and closed comedones      Inflammatory papules and pustules      Verrucoid papule consistent consistent with wart     Erythematous eczematous patches and plaques     Dystrophic onycholytic nail with subungual  debris c/w onychomycosis     Umbilicated papule    Erythematous-base heme-crusted tan verrucoid plaque consistent with inflamed seborrheic keratosis     Erythematous Silvery Scaling Plaque c/w Psoriasis     See annotation              Assessment / Plan:      Pathology Orders:     Normal Orders This Visit    Specimen to Pathology, Dermatology     Questions:    Procedure Type: Dermatology and skin neoplasms    Number of Specimens: 1    ------------------------: -------------------------    Spec 1 Procedure: Excision >2cm    Spec 1 Clinical Impression: hyperpigmented firm nodule, DF vs other    Spec 1 Source: R hip        Neoplasm of uncertain behavior of skin  -     Specimen to Pathology, Dermatology    PROCEDURE: Elliptical excision with intermediate layered repair in order to decrease dead space, decrease tension and close large gap.    ANESTHETIC: 10 cc 1% Xylocaine with Epinephrine 1:100,000, buffered    SURGEON: Shaye Jacob MD  ASSISTANTS: Tegan Garcia LPN ; Janet Flores LPN, Lucia Fenton MA    PREOPERATIVE DIAGNOSIS:  NUB    POSTOPERATIVE DIAGNOSIS:  Same as preoperative diagnosis    PATHOLOGIC DIAGNOSIS: Pending    LOCATION: R upper hip    INITIAL LESION SIZE: 1.2 cm    EXCISED DIAMETER: 1.6 cm    PREPARATION: The diagnosis, procedure, alternatives, benefits and risks, including but not limited to: infection, bleeding/bruising, drug reactions, pain, scar or cosmetic defect, local sensation disturbances, wound dehiscence (separation of wound edges after sutures removed) and/or recurrence of present condition were explained to the patient. The patient elected to proceed.  Patient's identity was verified using 2 patient identifiers and the side and site was verified.  Time out period with surgeon, assistant and patient in surgical suite was taken.    PROCEDURE: The location noted above was prepped and draped in the usual sterile fashion. The area was anesthetized with intradermal buffered xylocaine.  Lesional tissue was carefully marked with at least 2 mm margins of clinically normal skin in all directions. A fusiform elliptical excision was done with #15 blade carried down completely through the dermis into the subcutaneous tissues to the level of the subcutaneous fat, and dissection was carried out in that plane.  Electrocoagulation was used to obtain hemostasis. Blood loss was minimal. The wound was then approximated in a layered fashion with subcutaneous and intradermal sutures of 4.0 Monocryl, approximately 6 in number, and the wound was then superficially closed with simple interrupted sutures and vertical mattress of 4.0 Prolene.    The patient tolerated the procedure well.    The area was cleaned and dressed appropriately and the patient was given wound care instructions, as well as an appointment for follow-up evaluation.    LENGTH OF REPAIR: 5 cm               Follow up in about 2 weeks (around 11/10/2020) for suture removal.

## 2020-10-29 LAB
FINAL PATHOLOGIC DIAGNOSIS: NORMAL
GROSS: NORMAL
MICROSCOPIC EXAM: NORMAL

## 2020-11-06 ENCOUNTER — CLINICAL SUPPORT (OUTPATIENT)
Dept: DERMATOLOGY | Facility: CLINIC | Age: 60
End: 2020-11-06
Payer: COMMERCIAL

## 2020-11-06 DIAGNOSIS — Z48.02 VISIT FOR SUTURE REMOVAL: Primary | ICD-10-CM

## 2020-11-26 ENCOUNTER — PATIENT MESSAGE (OUTPATIENT)
Dept: FAMILY MEDICINE | Facility: CLINIC | Age: 60
End: 2020-11-26

## 2020-11-26 DIAGNOSIS — K64.4 EXTERNAL BLEEDING HEMORRHOIDS: Primary | ICD-10-CM

## 2020-11-29 RX ORDER — HYDROCORTISONE ACETATE 25 MG/1
25 SUPPOSITORY RECTAL 2 TIMES DAILY
Qty: 20 SUPPOSITORY | Refills: 0 | Status: SHIPPED | OUTPATIENT
Start: 2020-11-29 | End: 2020-12-09

## 2020-11-30 NOTE — TELEPHONE ENCOUNTER
Patient sent a request for bleeding hemorrhoids and I have sent hydrocortisone rectal suppository if it is available.  If not he can try over-the-counter preparation H type of creams.

## 2021-01-14 ENCOUNTER — OFFICE VISIT (OUTPATIENT)
Dept: FAMILY MEDICINE | Facility: CLINIC | Age: 61
End: 2021-01-14
Payer: COMMERCIAL

## 2021-01-14 VITALS
HEART RATE: 73 BPM | SYSTOLIC BLOOD PRESSURE: 102 MMHG | TEMPERATURE: 97 F | WEIGHT: 212 LBS | BODY MASS INDEX: 28.1 KG/M2 | HEIGHT: 73 IN | DIASTOLIC BLOOD PRESSURE: 68 MMHG

## 2021-01-14 DIAGNOSIS — R21 RASH OF BODY: Primary | ICD-10-CM

## 2021-01-14 PROCEDURE — 1126F AMNT PAIN NOTED NONE PRSNT: CPT | Mod: S$GLB,,, | Performed by: INTERNAL MEDICINE

## 2021-01-14 PROCEDURE — 3008F PR BODY MASS INDEX (BMI) DOCUMENTED: ICD-10-PCS | Mod: S$GLB,,, | Performed by: INTERNAL MEDICINE

## 2021-01-14 PROCEDURE — 3008F BODY MASS INDEX DOCD: CPT | Mod: S$GLB,,, | Performed by: INTERNAL MEDICINE

## 2021-01-14 PROCEDURE — 99213 PR OFFICE/OUTPT VISIT, EST, LEVL III, 20-29 MIN: ICD-10-PCS | Mod: S$GLB,,, | Performed by: INTERNAL MEDICINE

## 2021-01-14 PROCEDURE — 99213 OFFICE O/P EST LOW 20 MIN: CPT | Mod: S$GLB,,, | Performed by: INTERNAL MEDICINE

## 2021-01-14 PROCEDURE — 1126F PR PAIN SEVERITY QUANTIFIED, NO PAIN PRESENT: ICD-10-PCS | Mod: S$GLB,,, | Performed by: INTERNAL MEDICINE

## 2021-01-14 RX ORDER — METHYLPREDNISOLONE 4 MG/1
TABLET ORAL
Qty: 1 PACKAGE | Refills: 0 | Status: SHIPPED | OUTPATIENT
Start: 2021-01-14 | End: 2021-01-26 | Stop reason: ALTCHOICE

## 2021-01-26 ENCOUNTER — OFFICE VISIT (OUTPATIENT)
Dept: ALLERGY | Facility: CLINIC | Age: 61
End: 2021-01-26
Payer: COMMERCIAL

## 2021-01-26 VITALS
OXYGEN SATURATION: 99 % | SYSTOLIC BLOOD PRESSURE: 122 MMHG | BODY MASS INDEX: 27.7 KG/M2 | DIASTOLIC BLOOD PRESSURE: 78 MMHG | WEIGHT: 209 LBS | HEART RATE: 89 BPM | TEMPERATURE: 98 F | HEIGHT: 73 IN

## 2021-01-26 DIAGNOSIS — R21 RASH OF BODY: ICD-10-CM

## 2021-01-26 DIAGNOSIS — J31.0 RHINITIS, UNSPECIFIED TYPE: Primary | ICD-10-CM

## 2021-01-26 PROCEDURE — 99243 OFF/OP CNSLTJ NEW/EST LOW 30: CPT | Mod: S$GLB,,, | Performed by: ALLERGY & IMMUNOLOGY

## 2021-01-26 PROCEDURE — 3008F BODY MASS INDEX DOCD: CPT | Mod: S$GLB,,, | Performed by: ALLERGY & IMMUNOLOGY

## 2021-01-26 PROCEDURE — 3008F PR BODY MASS INDEX (BMI) DOCUMENTED: ICD-10-PCS | Mod: S$GLB,,, | Performed by: ALLERGY & IMMUNOLOGY

## 2021-01-26 PROCEDURE — 99243 PR OFFICE CONSULTATION,LEVEL III: ICD-10-PCS | Mod: S$GLB,,, | Performed by: ALLERGY & IMMUNOLOGY

## 2021-01-26 RX ORDER — CAMPHOR AND MENTHOL 5; 5 MG/ML; MG/ML
LOTION TOPICAL
Qty: 222 ML | Refills: 3 | Status: SHIPPED | OUTPATIENT
Start: 2021-01-26 | End: 2021-04-06

## 2021-01-26 RX ORDER — LEVOCETIRIZINE DIHYDROCHLORIDE 5 MG/1
5 TABLET, FILM COATED ORAL 2 TIMES DAILY PRN
Qty: 60 TABLET | Refills: 11 | Status: SHIPPED | OUTPATIENT
Start: 2021-01-26 | End: 2021-04-06

## 2021-02-11 ENCOUNTER — PROCEDURE VISIT (OUTPATIENT)
Dept: ALLERGY | Facility: CLINIC | Age: 61
End: 2021-02-11
Payer: COMMERCIAL

## 2021-02-11 VITALS
DIASTOLIC BLOOD PRESSURE: 77 MMHG | BODY MASS INDEX: 27.7 KG/M2 | SYSTOLIC BLOOD PRESSURE: 137 MMHG | HEIGHT: 73 IN | TEMPERATURE: 98 F | HEART RATE: 73 BPM | WEIGHT: 209 LBS

## 2021-02-11 DIAGNOSIS — R21 RASH OF BODY: ICD-10-CM

## 2021-02-11 DIAGNOSIS — J31.0 RHINITIS, UNSPECIFIED TYPE: ICD-10-CM

## 2021-02-11 PROCEDURE — 95004 PR ALLERGY SKIN TESTS,ALLERGENS: ICD-10-PCS | Mod: S$GLB,,, | Performed by: ALLERGY & IMMUNOLOGY

## 2021-02-11 PROCEDURE — 95004 PERQ TESTS W/ALRGNC XTRCS: CPT | Mod: S$GLB,,, | Performed by: ALLERGY & IMMUNOLOGY

## 2021-03-03 ENCOUNTER — OFFICE VISIT (OUTPATIENT)
Dept: ALLERGY | Facility: CLINIC | Age: 61
End: 2021-03-03
Payer: COMMERCIAL

## 2021-03-03 VITALS
HEART RATE: 81 BPM | DIASTOLIC BLOOD PRESSURE: 71 MMHG | OXYGEN SATURATION: 97 % | WEIGHT: 212 LBS | TEMPERATURE: 99 F | SYSTOLIC BLOOD PRESSURE: 122 MMHG | BODY MASS INDEX: 28.1 KG/M2 | HEIGHT: 73 IN

## 2021-03-03 DIAGNOSIS — R21 RASH OF BODY: Primary | ICD-10-CM

## 2021-03-03 DIAGNOSIS — J30.1 SEASONAL ALLERGIC RHINITIS DUE TO POLLEN: ICD-10-CM

## 2021-03-03 PROCEDURE — 3008F BODY MASS INDEX DOCD: CPT | Mod: S$GLB,,, | Performed by: ALLERGY & IMMUNOLOGY

## 2021-03-03 PROCEDURE — 99214 PR OFFICE/OUTPT VISIT, EST, LEVL IV, 30-39 MIN: ICD-10-PCS | Mod: S$GLB,,, | Performed by: ALLERGY & IMMUNOLOGY

## 2021-03-03 PROCEDURE — 99214 OFFICE O/P EST MOD 30 MIN: CPT | Mod: S$GLB,,, | Performed by: ALLERGY & IMMUNOLOGY

## 2021-03-03 PROCEDURE — 3008F PR BODY MASS INDEX (BMI) DOCUMENTED: ICD-10-PCS | Mod: S$GLB,,, | Performed by: ALLERGY & IMMUNOLOGY

## 2021-03-09 ENCOUNTER — PATIENT MESSAGE (OUTPATIENT)
Dept: ALLERGY | Facility: CLINIC | Age: 61
End: 2021-03-09

## 2021-03-12 ENCOUNTER — PROCEDURE VISIT (OUTPATIENT)
Dept: ALLERGY | Facility: CLINIC | Age: 61
End: 2021-03-12
Payer: COMMERCIAL

## 2021-03-12 ENCOUNTER — PATIENT MESSAGE (OUTPATIENT)
Dept: ALLERGY | Facility: CLINIC | Age: 61
End: 2021-03-12

## 2021-03-12 DIAGNOSIS — J30.1 SEASONAL ALLERGIC RHINITIS DUE TO POLLEN: ICD-10-CM

## 2021-03-12 PROCEDURE — 95165 ANTIGEN THERAPY SERVICES: CPT | Mod: S$GLB,,, | Performed by: ALLERGY & IMMUNOLOGY

## 2021-03-12 PROCEDURE — 95165 PR PROFES SVC,IMMUNOTHER,SINGLE/MULT AGS: ICD-10-PCS | Mod: S$GLB,,, | Performed by: ALLERGY & IMMUNOLOGY

## 2021-03-18 ENCOUNTER — TELEPHONE (OUTPATIENT)
Dept: UROLOGY | Facility: CLINIC | Age: 61
End: 2021-03-18

## 2021-03-19 ENCOUNTER — OFFICE VISIT (OUTPATIENT)
Dept: UROLOGY | Facility: CLINIC | Age: 61
End: 2021-03-19
Payer: COMMERCIAL

## 2021-03-19 VITALS
HEIGHT: 73 IN | SYSTOLIC BLOOD PRESSURE: 135 MMHG | WEIGHT: 212.06 LBS | HEART RATE: 80 BPM | DIASTOLIC BLOOD PRESSURE: 81 MMHG | TEMPERATURE: 98 F | BODY MASS INDEX: 28.11 KG/M2

## 2021-03-19 DIAGNOSIS — Z12.5 SCREENING FOR PROSTATE CANCER: ICD-10-CM

## 2021-03-19 DIAGNOSIS — R79.89 LOW SERUM TESTOSTERONE LEVEL: Primary | ICD-10-CM

## 2021-03-19 DIAGNOSIS — R39.9 LOWER URINARY TRACT SYMPTOMS (LUTS): ICD-10-CM

## 2021-03-19 PROCEDURE — 99999 PR PBB SHADOW E&M-EST. PATIENT-LVL III: ICD-10-PCS | Mod: PBBFAC,,, | Performed by: UROLOGY

## 2021-03-19 PROCEDURE — 3008F PR BODY MASS INDEX (BMI) DOCUMENTED: ICD-10-PCS | Mod: CPTII,S$GLB,, | Performed by: UROLOGY

## 2021-03-19 PROCEDURE — 99999 PR PBB SHADOW E&M-EST. PATIENT-LVL III: CPT | Mod: PBBFAC,,, | Performed by: UROLOGY

## 2021-03-19 PROCEDURE — 99214 OFFICE O/P EST MOD 30 MIN: CPT | Mod: S$GLB,,, | Performed by: UROLOGY

## 2021-03-19 PROCEDURE — 1126F AMNT PAIN NOTED NONE PRSNT: CPT | Mod: S$GLB,,, | Performed by: UROLOGY

## 2021-03-19 PROCEDURE — 1126F PR PAIN SEVERITY QUANTIFIED, NO PAIN PRESENT: ICD-10-PCS | Mod: S$GLB,,, | Performed by: UROLOGY

## 2021-03-19 PROCEDURE — 99214 PR OFFICE/OUTPT VISIT, EST, LEVL IV, 30-39 MIN: ICD-10-PCS | Mod: S$GLB,,, | Performed by: UROLOGY

## 2021-03-19 PROCEDURE — 3008F BODY MASS INDEX DOCD: CPT | Mod: CPTII,S$GLB,, | Performed by: UROLOGY

## 2021-03-19 RX ORDER — TESTOSTERONE CYPIONATE 200 MG/ML
100 INJECTION, SOLUTION INTRAMUSCULAR
Qty: 5 ML | Refills: 1 | Status: SHIPPED | OUTPATIENT
Start: 2021-03-19 | End: 2021-04-06 | Stop reason: SDUPTHER

## 2021-03-23 ENCOUNTER — LAB VISIT (OUTPATIENT)
Dept: LAB | Facility: HOSPITAL | Age: 61
End: 2021-03-23
Attending: UROLOGY
Payer: COMMERCIAL

## 2021-03-23 DIAGNOSIS — R79.89 LOW SERUM TESTOSTERONE LEVEL: ICD-10-CM

## 2021-03-23 DIAGNOSIS — Z12.5 SCREENING FOR PROSTATE CANCER: ICD-10-CM

## 2021-03-23 LAB
BASOPHILS # BLD AUTO: 0.06 K/UL (ref 0–0.2)
BASOPHILS NFR BLD: 1 % (ref 0–1.9)
CHOLEST SERPL-MCNC: 155 MG/DL (ref 120–199)
CHOLEST/HDLC SERPL: 4.6 {RATIO} (ref 2–5)
COMPLEXED PSA SERPL-MCNC: 0.15 NG/ML (ref 0–4)
DIFFERENTIAL METHOD: ABNORMAL
EOSINOPHIL # BLD AUTO: 0.2 K/UL (ref 0–0.5)
EOSINOPHIL NFR BLD: 3.1 % (ref 0–8)
ERYTHROCYTE [DISTWIDTH] IN BLOOD BY AUTOMATED COUNT: 13.8 % (ref 11.5–14.5)
ESTRADIOL SERPL-MCNC: 26 PG/ML (ref 11–44)
HCT VFR BLD AUTO: 51.8 % (ref 40–54)
HDLC SERPL-MCNC: 34 MG/DL (ref 40–75)
HDLC SERPL: 21.9 % (ref 20–50)
HGB BLD-MCNC: 17 G/DL (ref 14–18)
IMM GRANULOCYTES # BLD AUTO: 0.04 K/UL (ref 0–0.04)
IMM GRANULOCYTES NFR BLD AUTO: 0.7 % (ref 0–0.5)
LDLC SERPL CALC-MCNC: 93.8 MG/DL (ref 63–159)
LYMPHOCYTES # BLD AUTO: 1.8 K/UL (ref 1–4.8)
LYMPHOCYTES NFR BLD: 30.4 % (ref 18–48)
MCH RBC QN AUTO: 31 PG (ref 27–31)
MCHC RBC AUTO-ENTMCNC: 32.8 G/DL (ref 32–36)
MCV RBC AUTO: 94 FL (ref 82–98)
MONOCYTES # BLD AUTO: 0.6 K/UL (ref 0.3–1)
MONOCYTES NFR BLD: 10.4 % (ref 4–15)
NEUTROPHILS # BLD AUTO: 3.2 K/UL (ref 1.8–7.7)
NEUTROPHILS NFR BLD: 54.4 % (ref 38–73)
NONHDLC SERPL-MCNC: 121 MG/DL
NRBC BLD-RTO: 0 /100 WBC
PLATELET # BLD AUTO: 188 K/UL (ref 150–350)
PMV BLD AUTO: 9 FL (ref 9.2–12.9)
RBC # BLD AUTO: 5.49 M/UL (ref 4.6–6.2)
TRIGL SERPL-MCNC: 136 MG/DL (ref 30–150)
WBC # BLD AUTO: 5.89 K/UL (ref 3.9–12.7)

## 2021-03-23 PROCEDURE — 80061 LIPID PANEL: CPT | Performed by: UROLOGY

## 2021-03-23 PROCEDURE — 84153 ASSAY OF PSA TOTAL: CPT | Performed by: UROLOGY

## 2021-03-23 PROCEDURE — 84403 ASSAY OF TOTAL TESTOSTERONE: CPT | Performed by: UROLOGY

## 2021-03-23 PROCEDURE — 82670 ASSAY OF TOTAL ESTRADIOL: CPT | Performed by: UROLOGY

## 2021-03-23 PROCEDURE — 85025 COMPLETE CBC W/AUTO DIFF WBC: CPT | Performed by: UROLOGY

## 2021-03-25 ENCOUNTER — PATIENT MESSAGE (OUTPATIENT)
Dept: UROLOGY | Facility: CLINIC | Age: 61
End: 2021-03-25

## 2021-03-29 LAB
ALBUMIN SERPL-MCNC: 4.3 G/DL (ref 3.6–5.1)
SHBG SERPL-SCNC: 26 NMOL/L (ref 22–77)
TESTOST FREE SERPL-MCNC: 29.7 PG/ML (ref 46–224)
TESTOST SERPL-MCNC: 198 NG/DL (ref 250–1100)
TESTOSTERONE.FREE+WB SERPL-MCNC: 58.5 NG/DL (ref 110–575)

## 2021-04-06 ENCOUNTER — TELEPHONE (OUTPATIENT)
Dept: UROLOGY | Facility: CLINIC | Age: 61
End: 2021-04-06

## 2021-04-06 ENCOUNTER — OFFICE VISIT (OUTPATIENT)
Dept: DERMATOLOGY | Facility: CLINIC | Age: 61
End: 2021-04-06
Payer: COMMERCIAL

## 2021-04-06 DIAGNOSIS — L81.4 SOLAR LENTIGO: ICD-10-CM

## 2021-04-06 DIAGNOSIS — D18.01 CHERRY ANGIOMA: ICD-10-CM

## 2021-04-06 DIAGNOSIS — L82.1 SEBORRHEIC KERATOSES: ICD-10-CM

## 2021-04-06 DIAGNOSIS — L57.0 ACTINIC KERATOSES: Primary | ICD-10-CM

## 2021-04-06 DIAGNOSIS — R79.89 LOW SERUM TESTOSTERONE LEVEL: Primary | ICD-10-CM

## 2021-04-06 PROCEDURE — 17003 DESTRUCT PREMALG LES 2-14: CPT | Mod: S$GLB,,, | Performed by: DERMATOLOGY

## 2021-04-06 PROCEDURE — 99214 PR OFFICE/OUTPT VISIT, EST, LEVL IV, 30-39 MIN: ICD-10-PCS | Mod: 25,S$GLB,, | Performed by: DERMATOLOGY

## 2021-04-06 PROCEDURE — 17003 DESTRUCTION, PREMALIGNANT LESIONS; SECOND THROUGH 14 LESIONS: ICD-10-PCS | Mod: S$GLB,,, | Performed by: DERMATOLOGY

## 2021-04-06 PROCEDURE — 1126F AMNT PAIN NOTED NONE PRSNT: CPT | Mod: S$GLB,,, | Performed by: DERMATOLOGY

## 2021-04-06 PROCEDURE — 17000 PR DESTRUCTION(LASER SURGERY,CRYOSURGERY,CHEMOSURGERY),PREMALIGNANT LESIONS,FIRST LESION: ICD-10-PCS | Mod: S$GLB,,, | Performed by: DERMATOLOGY

## 2021-04-06 PROCEDURE — 99214 OFFICE O/P EST MOD 30 MIN: CPT | Mod: 25,S$GLB,, | Performed by: DERMATOLOGY

## 2021-04-06 PROCEDURE — 17000 DESTRUCT PREMALG LESION: CPT | Mod: S$GLB,,, | Performed by: DERMATOLOGY

## 2021-04-06 PROCEDURE — 1126F PR PAIN SEVERITY QUANTIFIED, NO PAIN PRESENT: ICD-10-PCS | Mod: S$GLB,,, | Performed by: DERMATOLOGY

## 2021-04-06 PROCEDURE — 99999 PR PBB SHADOW E&M-EST. PATIENT-LVL II: CPT | Mod: PBBFAC,,, | Performed by: DERMATOLOGY

## 2021-04-06 PROCEDURE — 99999 PR PBB SHADOW E&M-EST. PATIENT-LVL II: ICD-10-PCS | Mod: PBBFAC,,, | Performed by: DERMATOLOGY

## 2021-04-06 RX ORDER — TESTOSTERONE CYPIONATE 200 MG/ML
200 INJECTION, SOLUTION INTRAMUSCULAR
Qty: 10 ML | Refills: 1 | Status: SHIPPED | OUTPATIENT
Start: 2021-04-06 | End: 2021-09-08 | Stop reason: SDUPTHER

## 2021-04-27 ENCOUNTER — IMMUNIZATION (OUTPATIENT)
Dept: PRIMARY CARE CLINIC | Facility: CLINIC | Age: 61
End: 2021-04-27
Payer: COMMERCIAL

## 2021-04-27 DIAGNOSIS — Z23 NEED FOR VACCINATION: Primary | ICD-10-CM

## 2021-04-27 PROCEDURE — 91300 COVID-19, MRNA, LNP-S, PF, 30 MCG/0.3 ML DOSE VACCINE: CPT | Mod: S$GLB,,, | Performed by: FAMILY MEDICINE

## 2021-04-27 PROCEDURE — 91300 COVID-19, MRNA, LNP-S, PF, 30 MCG/0.3 ML DOSE VACCINE: ICD-10-PCS | Mod: S$GLB,,, | Performed by: FAMILY MEDICINE

## 2021-04-27 PROCEDURE — 0001A COVID-19, MRNA, LNP-S, PF, 30 MCG/0.3 ML DOSE VACCINE: CPT | Mod: CV19,S$GLB,, | Performed by: FAMILY MEDICINE

## 2021-04-27 PROCEDURE — 0001A COVID-19, MRNA, LNP-S, PF, 30 MCG/0.3 ML DOSE VACCINE: ICD-10-PCS | Mod: CV19,S$GLB,, | Performed by: FAMILY MEDICINE

## 2021-05-20 ENCOUNTER — IMMUNIZATION (OUTPATIENT)
Dept: PRIMARY CARE CLINIC | Facility: CLINIC | Age: 61
End: 2021-05-20
Payer: COMMERCIAL

## 2021-05-20 DIAGNOSIS — Z23 NEED FOR VACCINATION: Primary | ICD-10-CM

## 2021-05-20 PROCEDURE — 91300 COVID-19, MRNA, LNP-S, PF, 30 MCG/0.3 ML DOSE VACCINE: CPT | Mod: S$GLB,,, | Performed by: FAMILY MEDICINE

## 2021-05-20 PROCEDURE — 91300 COVID-19, MRNA, LNP-S, PF, 30 MCG/0.3 ML DOSE VACCINE: ICD-10-PCS | Mod: S$GLB,,, | Performed by: FAMILY MEDICINE

## 2021-05-20 PROCEDURE — 0002A COVID-19, MRNA, LNP-S, PF, 30 MCG/0.3 ML DOSE VACCINE: CPT | Mod: CV19,S$GLB,, | Performed by: FAMILY MEDICINE

## 2021-05-20 PROCEDURE — 0002A COVID-19, MRNA, LNP-S, PF, 30 MCG/0.3 ML DOSE VACCINE: ICD-10-PCS | Mod: CV19,S$GLB,, | Performed by: FAMILY MEDICINE

## 2021-09-08 ENCOUNTER — PATIENT MESSAGE (OUTPATIENT)
Dept: UROLOGY | Facility: CLINIC | Age: 61
End: 2021-09-08

## 2021-09-08 ENCOUNTER — TELEPHONE (OUTPATIENT)
Dept: UROLOGY | Facility: CLINIC | Age: 61
End: 2021-09-08

## 2021-09-08 DIAGNOSIS — R79.89 LOW SERUM TESTOSTERONE LEVEL: Primary | ICD-10-CM

## 2021-09-08 RX ORDER — TESTOSTERONE CYPIONATE 200 MG/ML
200 INJECTION, SOLUTION INTRAMUSCULAR
Qty: 10 ML | Refills: 1 | Status: SHIPPED | OUTPATIENT
Start: 2021-09-08 | End: 2021-09-08

## 2021-09-08 RX ORDER — TESTOSTERONE CYPIONATE 200 MG/ML
200 INJECTION, SOLUTION INTRAMUSCULAR
Qty: 10 ML | Refills: 1 | Status: SHIPPED | OUTPATIENT
Start: 2021-09-08 | End: 2021-10-13 | Stop reason: SDUPTHER

## 2021-09-09 ENCOUNTER — PATIENT MESSAGE (OUTPATIENT)
Dept: UROLOGY | Facility: CLINIC | Age: 61
End: 2021-09-09

## 2021-09-09 DIAGNOSIS — R79.89 LOW SERUM TESTOSTERONE LEVEL: Primary | ICD-10-CM

## 2021-10-13 ENCOUNTER — OFFICE VISIT (OUTPATIENT)
Dept: UROLOGY | Facility: CLINIC | Age: 61
End: 2021-10-13
Payer: COMMERCIAL

## 2021-10-13 VITALS
SYSTOLIC BLOOD PRESSURE: 113 MMHG | HEART RATE: 67 BPM | HEIGHT: 73 IN | BODY MASS INDEX: 28.79 KG/M2 | DIASTOLIC BLOOD PRESSURE: 74 MMHG | WEIGHT: 217.25 LBS

## 2021-10-13 DIAGNOSIS — R79.89 LOW SERUM TESTOSTERONE LEVEL: ICD-10-CM

## 2021-10-13 PROCEDURE — 3008F PR BODY MASS INDEX (BMI) DOCUMENTED: ICD-10-PCS | Mod: CPTII,S$GLB,, | Performed by: UROLOGY

## 2021-10-13 PROCEDURE — 3078F PR MOST RECENT DIASTOLIC BLOOD PRESSURE < 80 MM HG: ICD-10-PCS | Mod: CPTII,S$GLB,, | Performed by: UROLOGY

## 2021-10-13 PROCEDURE — 3008F BODY MASS INDEX DOCD: CPT | Mod: CPTII,S$GLB,, | Performed by: UROLOGY

## 2021-10-13 PROCEDURE — 3074F PR MOST RECENT SYSTOLIC BLOOD PRESSURE < 130 MM HG: ICD-10-PCS | Mod: CPTII,S$GLB,, | Performed by: UROLOGY

## 2021-10-13 PROCEDURE — 1159F PR MEDICATION LIST DOCUMENTED IN MEDICAL RECORD: ICD-10-PCS | Mod: CPTII,S$GLB,, | Performed by: UROLOGY

## 2021-10-13 PROCEDURE — 1159F MED LIST DOCD IN RCRD: CPT | Mod: CPTII,S$GLB,, | Performed by: UROLOGY

## 2021-10-13 PROCEDURE — 1160F RVW MEDS BY RX/DR IN RCRD: CPT | Mod: CPTII,S$GLB,, | Performed by: UROLOGY

## 2021-10-13 PROCEDURE — 99214 PR OFFICE/OUTPT VISIT, EST, LEVL IV, 30-39 MIN: ICD-10-PCS | Mod: S$GLB,,, | Performed by: UROLOGY

## 2021-10-13 PROCEDURE — 99999 PR PBB SHADOW E&M-EST. PATIENT-LVL III: ICD-10-PCS | Mod: PBBFAC,,, | Performed by: UROLOGY

## 2021-10-13 PROCEDURE — 99999 PR PBB SHADOW E&M-EST. PATIENT-LVL III: CPT | Mod: PBBFAC,,, | Performed by: UROLOGY

## 2021-10-13 PROCEDURE — 1160F PR REVIEW ALL MEDS BY PRESCRIBER/CLIN PHARMACIST DOCUMENTED: ICD-10-PCS | Mod: CPTII,S$GLB,, | Performed by: UROLOGY

## 2021-10-13 PROCEDURE — 99214 OFFICE O/P EST MOD 30 MIN: CPT | Mod: S$GLB,,, | Performed by: UROLOGY

## 2021-10-13 PROCEDURE — 3078F DIAST BP <80 MM HG: CPT | Mod: CPTII,S$GLB,, | Performed by: UROLOGY

## 2021-10-13 PROCEDURE — 3074F SYST BP LT 130 MM HG: CPT | Mod: CPTII,S$GLB,, | Performed by: UROLOGY

## 2021-10-13 RX ORDER — TESTOSTERONE CYPIONATE 200 MG/ML
200 INJECTION, SOLUTION INTRAMUSCULAR
Qty: 10 ML | Refills: 1 | Status: SHIPPED | OUTPATIENT
Start: 2021-10-13 | End: 2022-02-08 | Stop reason: SDUPTHER

## 2021-12-01 ENCOUNTER — OFFICE VISIT (OUTPATIENT)
Dept: DERMATOLOGY | Facility: CLINIC | Age: 61
End: 2021-12-01
Payer: COMMERCIAL

## 2021-12-01 VITALS — HEIGHT: 73 IN | WEIGHT: 217 LBS | BODY MASS INDEX: 28.76 KG/M2

## 2021-12-01 DIAGNOSIS — L82.1 SEBORRHEIC KERATOSES: ICD-10-CM

## 2021-12-01 DIAGNOSIS — L57.0 ACTINIC KERATOSES: ICD-10-CM

## 2021-12-01 DIAGNOSIS — D18.01 CHERRY ANGIOMA: ICD-10-CM

## 2021-12-01 DIAGNOSIS — L98.9 DISEASE OF SKIN AND SUBCUTANEOUS TISSUE: ICD-10-CM

## 2021-12-01 DIAGNOSIS — D48.5 NEOPLASM OF UNCERTAIN BEHAVIOR OF SKIN: Primary | ICD-10-CM

## 2021-12-01 PROCEDURE — 17003 DESTRUCTION, PREMALIGNANT LESIONS; SECOND THROUGH 14 LESIONS: ICD-10-PCS | Mod: S$GLB,,, | Performed by: DERMATOLOGY

## 2021-12-01 PROCEDURE — 88305 TISSUE EXAM BY PATHOLOGIST: CPT | Performed by: PATHOLOGY

## 2021-12-01 PROCEDURE — 99999 PR PBB SHADOW E&M-EST. PATIENT-LVL III: ICD-10-PCS | Mod: PBBFAC,,, | Performed by: DERMATOLOGY

## 2021-12-01 PROCEDURE — 17003 DESTRUCT PREMALG LES 2-14: CPT | Mod: S$GLB,,, | Performed by: DERMATOLOGY

## 2021-12-01 PROCEDURE — 99999 PR PBB SHADOW E&M-EST. PATIENT-LVL III: CPT | Mod: PBBFAC,,, | Performed by: DERMATOLOGY

## 2021-12-01 PROCEDURE — 17000 PR DESTRUCTION(LASER SURGERY,CRYOSURGERY,CHEMOSURGERY),PREMALIGNANT LESIONS,FIRST LESION: ICD-10-PCS | Mod: 59,S$GLB,, | Performed by: DERMATOLOGY

## 2021-12-01 PROCEDURE — 99213 PR OFFICE/OUTPT VISIT, EST, LEVL III, 20-29 MIN: ICD-10-PCS | Mod: 25,S$GLB,, | Performed by: DERMATOLOGY

## 2021-12-01 PROCEDURE — 88305 TISSUE EXAM BY PATHOLOGIST: ICD-10-PCS | Mod: 26,,, | Performed by: PATHOLOGY

## 2021-12-01 PROCEDURE — 11102 PR TANGENTIAL BIOPSY, SKIN, SINGLE LESION: ICD-10-PCS | Mod: S$GLB,,, | Performed by: DERMATOLOGY

## 2021-12-01 PROCEDURE — 17000 DESTRUCT PREMALG LESION: CPT | Mod: 59,S$GLB,, | Performed by: DERMATOLOGY

## 2021-12-01 PROCEDURE — 99213 OFFICE O/P EST LOW 20 MIN: CPT | Mod: 25,S$GLB,, | Performed by: DERMATOLOGY

## 2021-12-01 PROCEDURE — 88305 TISSUE EXAM BY PATHOLOGIST: CPT | Mod: 26,,, | Performed by: PATHOLOGY

## 2021-12-01 PROCEDURE — 11102 TANGNTL BX SKIN SINGLE LES: CPT | Mod: S$GLB,,, | Performed by: DERMATOLOGY

## 2021-12-01 RX ORDER — HYDROCORTISONE 25 MG/G
CREAM TOPICAL 2 TIMES DAILY PRN
COMMUNITY
Start: 2021-11-01 | End: 2022-10-05

## 2021-12-03 LAB
FINAL PATHOLOGIC DIAGNOSIS: NORMAL
GROSS: NORMAL
Lab: NORMAL
MICROSCOPIC EXAM: NORMAL

## 2021-12-22 ENCOUNTER — OFFICE VISIT (OUTPATIENT)
Dept: FAMILY MEDICINE | Facility: CLINIC | Age: 61
End: 2021-12-22
Payer: COMMERCIAL

## 2021-12-22 VITALS
SYSTOLIC BLOOD PRESSURE: 116 MMHG | HEIGHT: 73 IN | DIASTOLIC BLOOD PRESSURE: 74 MMHG | WEIGHT: 223 LBS | HEART RATE: 76 BPM | BODY MASS INDEX: 29.55 KG/M2

## 2021-12-22 DIAGNOSIS — N13.8 BPH WITH OBSTRUCTION/LOWER URINARY TRACT SYMPTOMS: ICD-10-CM

## 2021-12-22 DIAGNOSIS — R79.89 LOW SERUM TESTOSTERONE LEVEL: ICD-10-CM

## 2021-12-22 DIAGNOSIS — Z00.00 WELLNESS EXAMINATION: Primary | ICD-10-CM

## 2021-12-22 DIAGNOSIS — L57.0 ACTINIC KERATOSES: ICD-10-CM

## 2021-12-22 DIAGNOSIS — G47.33 OSA ON CPAP: ICD-10-CM

## 2021-12-22 DIAGNOSIS — N40.1 BPH WITH OBSTRUCTION/LOWER URINARY TRACT SYMPTOMS: ICD-10-CM

## 2021-12-22 PROCEDURE — 3074F SYST BP LT 130 MM HG: CPT | Mod: S$GLB,,, | Performed by: FAMILY MEDICINE

## 2021-12-22 PROCEDURE — 3008F BODY MASS INDEX DOCD: CPT | Mod: S$GLB,,, | Performed by: FAMILY MEDICINE

## 2021-12-22 PROCEDURE — 3074F PR MOST RECENT SYSTOLIC BLOOD PRESSURE < 130 MM HG: ICD-10-PCS | Mod: S$GLB,,, | Performed by: FAMILY MEDICINE

## 2021-12-22 PROCEDURE — 1160F RVW MEDS BY RX/DR IN RCRD: CPT | Mod: S$GLB,,, | Performed by: FAMILY MEDICINE

## 2021-12-22 PROCEDURE — 99396 PR PREVENTIVE VISIT,EST,40-64: ICD-10-PCS | Mod: S$GLB,,, | Performed by: FAMILY MEDICINE

## 2021-12-22 PROCEDURE — 3078F PR MOST RECENT DIASTOLIC BLOOD PRESSURE < 80 MM HG: ICD-10-PCS | Mod: S$GLB,,, | Performed by: FAMILY MEDICINE

## 2021-12-22 PROCEDURE — 3078F DIAST BP <80 MM HG: CPT | Mod: S$GLB,,, | Performed by: FAMILY MEDICINE

## 2021-12-22 PROCEDURE — 1160F PR REVIEW ALL MEDS BY PRESCRIBER/CLIN PHARMACIST DOCUMENTED: ICD-10-PCS | Mod: S$GLB,,, | Performed by: FAMILY MEDICINE

## 2021-12-22 PROCEDURE — 3008F PR BODY MASS INDEX (BMI) DOCUMENTED: ICD-10-PCS | Mod: S$GLB,,, | Performed by: FAMILY MEDICINE

## 2021-12-22 PROCEDURE — 99396 PREV VISIT EST AGE 40-64: CPT | Mod: S$GLB,,, | Performed by: FAMILY MEDICINE

## 2021-12-24 LAB
ALBUMIN SERPL-MCNC: 4 G/DL (ref 3.6–5.1)
ALBUMIN/GLOB SERPL: 1.3 (CALC) (ref 1–2.5)
ALP SERPL-CCNC: 44 U/L (ref 35–144)
ALT SERPL-CCNC: 27 U/L (ref 9–46)
APPEARANCE UR: CLEAR
AST SERPL-CCNC: 18 U/L (ref 10–35)
BACTERIA #/AREA URNS HPF: NORMAL /HPF
BACTERIA UR CULT: NORMAL
BASOPHILS # BLD AUTO: 73 CELLS/UL (ref 0–200)
BASOPHILS NFR BLD AUTO: 1.2 %
BILIRUB SERPL-MCNC: 0.6 MG/DL (ref 0.2–1.2)
BILIRUB UR QL STRIP: NEGATIVE
BUN SERPL-MCNC: 13 MG/DL (ref 7–25)
BUN/CREAT SERPL: NORMAL (CALC) (ref 6–22)
CALCIUM SERPL-MCNC: 9.4 MG/DL (ref 8.6–10.3)
CHLORIDE SERPL-SCNC: 101 MMOL/L (ref 98–110)
CHOLEST SERPL-MCNC: 156 MG/DL
CHOLEST/HDLC SERPL: 4.5 (CALC)
CO2 SERPL-SCNC: 27 MMOL/L (ref 20–32)
COLOR UR: YELLOW
CREAT SERPL-MCNC: 1.19 MG/DL (ref 0.7–1.25)
EOSINOPHIL # BLD AUTO: 183 CELLS/UL (ref 15–500)
EOSINOPHIL NFR BLD AUTO: 3 %
ERYTHROCYTE [DISTWIDTH] IN BLOOD BY AUTOMATED COUNT: 13.8 % (ref 11–15)
GLOBULIN SER CALC-MCNC: 3.1 G/DL (CALC) (ref 1.9–3.7)
GLUCOSE SERPL-MCNC: 89 MG/DL (ref 65–99)
GLUCOSE UR QL STRIP: NEGATIVE
HCT VFR BLD AUTO: 56.2 % (ref 38.5–50)
HDLC SERPL-MCNC: 35 MG/DL
HGB BLD-MCNC: 18.9 G/DL (ref 13.2–17.1)
HGB UR QL STRIP: NEGATIVE
HYALINE CASTS #/AREA URNS LPF: NORMAL /LPF
KETONES UR QL STRIP: NEGATIVE
LDLC SERPL CALC-MCNC: 97 MG/DL (CALC)
LEUKOCYTE ESTERASE UR QL STRIP: NEGATIVE
LYMPHOCYTES # BLD AUTO: 1525 CELLS/UL (ref 850–3900)
LYMPHOCYTES NFR BLD AUTO: 25 %
MCH RBC QN AUTO: 30 PG (ref 27–33)
MCHC RBC AUTO-ENTMCNC: 33.6 G/DL (ref 32–36)
MCV RBC AUTO: 89.2 FL (ref 80–100)
MONOCYTES # BLD AUTO: 744 CELLS/UL (ref 200–950)
MONOCYTES NFR BLD AUTO: 12.2 %
NEUTROPHILS # BLD AUTO: 3575 CELLS/UL (ref 1500–7800)
NEUTROPHILS NFR BLD AUTO: 58.6 %
NITRITE UR QL STRIP: NEGATIVE
NONHDLC SERPL-MCNC: 121 MG/DL (CALC)
PH UR STRIP: NORMAL [PH] (ref 5–8)
PLATELET # BLD AUTO: 201 THOUSAND/UL (ref 140–400)
PMV BLD REES-ECKER: 9.4 FL (ref 7.5–12.5)
POTASSIUM SERPL-SCNC: 4.4 MMOL/L (ref 3.5–5.3)
PROT SERPL-MCNC: 7.1 G/DL (ref 6.1–8.1)
PROT UR QL STRIP: NEGATIVE
RBC # BLD AUTO: 6.3 MILLION/UL (ref 4.2–5.8)
RBC #/AREA URNS HPF: NORMAL /HPF
SODIUM SERPL-SCNC: 137 MMOL/L (ref 135–146)
SP GR UR STRIP: 1.02 (ref 1–1.03)
SQUAMOUS #/AREA URNS HPF: NORMAL /HPF
TRIGL SERPL-MCNC: 137 MG/DL
TSH SERPL-ACNC: 2.18 MIU/L (ref 0.4–4.5)
WBC # BLD AUTO: 6.1 THOUSAND/UL (ref 3.8–10.8)
WBC #/AREA URNS HPF: NORMAL /HPF

## 2021-12-27 ENCOUNTER — TELEPHONE (OUTPATIENT)
Dept: FAMILY MEDICINE | Facility: CLINIC | Age: 61
End: 2021-12-27
Payer: COMMERCIAL

## 2022-03-22 ENCOUNTER — TELEPHONE (OUTPATIENT)
Dept: FAMILY MEDICINE | Facility: CLINIC | Age: 62
End: 2022-03-22
Payer: COMMERCIAL

## 2022-03-22 DIAGNOSIS — R71.8 ELEVATED HEMATOCRIT: Primary | ICD-10-CM

## 2022-03-22 DIAGNOSIS — Z79.899 ENCOUNTER FOR LONG-TERM (CURRENT) USE OF OTHER MEDICATIONS: ICD-10-CM

## 2022-03-22 NOTE — TELEPHONE ENCOUNTER
----- Message from OlamideRT Hernesto sent at 12/27/2021  2:33 PM CST -----  Regarding: Lab due  ----- Message from Edwin Cottrell MD sent at 12/25/2021 11:37 AM CST -----  Call patient.  His blood counts are getting rather thick.  Hematocrit is up to 56.2.  This is due to the testosterone replacement.  To avoid strokes, he needs to donate blood at the blood center 1 unit every 3 months if hematocrit greater than 50. We will give him an order to take to the blood center that will allow this to happen.  Sugar kidneys thyroid and liver look normal.  Cholesterol excellent at 156.   repeat CBC 3 months

## 2022-03-22 NOTE — TELEPHONE ENCOUNTER
Spoke to patient that lab is due to recheck blood counts and he does not need to fast. Said he will come tomorrow. Updated remind me. Order sent.

## 2022-03-25 LAB
BASOPHILS # BLD AUTO: 72 CELLS/UL (ref 0–200)
BASOPHILS NFR BLD AUTO: 1.1 %
EOSINOPHIL # BLD AUTO: 202 CELLS/UL (ref 15–500)
EOSINOPHIL NFR BLD AUTO: 3.1 %
ERYTHROCYTE [DISTWIDTH] IN BLOOD BY AUTOMATED COUNT: 13.7 % (ref 11–15)
HCT VFR BLD AUTO: 55.1 % (ref 38.5–50)
HGB BLD-MCNC: 17.9 G/DL (ref 13.2–17.1)
LYMPHOCYTES # BLD AUTO: 1547 CELLS/UL (ref 850–3900)
LYMPHOCYTES NFR BLD AUTO: 23.8 %
MCH RBC QN AUTO: 29.2 PG (ref 27–33)
MCHC RBC AUTO-ENTMCNC: 32.5 G/DL (ref 32–36)
MCV RBC AUTO: 90 FL (ref 80–100)
MONOCYTES # BLD AUTO: 787 CELLS/UL (ref 200–950)
MONOCYTES NFR BLD AUTO: 12.1 %
NEUTROPHILS # BLD AUTO: 3894 CELLS/UL (ref 1500–7800)
NEUTROPHILS NFR BLD AUTO: 59.9 %
PLATELET # BLD AUTO: 191 THOUSAND/UL (ref 140–400)
PMV BLD REES-ECKER: 9.2 FL (ref 7.5–12.5)
RBC # BLD AUTO: 6.12 MILLION/UL (ref 4.2–5.8)
WBC # BLD AUTO: 6.5 THOUSAND/UL (ref 3.8–10.8)

## 2022-03-28 ENCOUNTER — TELEPHONE (OUTPATIENT)
Dept: FAMILY MEDICINE | Facility: CLINIC | Age: 62
End: 2022-03-28
Payer: COMMERCIAL

## 2022-03-28 NOTE — PROGRESS NOTES
Call patient.  His CBC shows he still has a high hematocrit at 55.1, needs to go donate 1 unit of blood at the blood center.  We can write him a new order for this if he needs 1. Goal is to get hematocrit to 50 or less.  Recheck CBC 3 months

## 2022-03-28 NOTE — TELEPHONE ENCOUNTER
Spoke to patient with results per Dr Cottrell. Said he does not need an order, that he still has donations left from his last order and will go next week. Remind me for lab.

## 2022-03-28 NOTE — TELEPHONE ENCOUNTER
----- Message from Edwin Cottrell MD sent at 3/27/2022  7:55 PM CDT -----  Call patient.  His CBC shows he still has a high hematocrit at 55.1, needs to go donate 1 unit of blood at the blood center.  We can write him a new order for this if he needs 1. Goal is to get hematocrit to 50 or less.  Recheck CBC 3 months

## 2022-06-01 ENCOUNTER — PATIENT MESSAGE (OUTPATIENT)
Dept: FAMILY MEDICINE | Facility: CLINIC | Age: 62
End: 2022-06-01

## 2022-06-02 ENCOUNTER — PATIENT MESSAGE (OUTPATIENT)
Dept: FAMILY MEDICINE | Facility: CLINIC | Age: 62
End: 2022-06-02

## 2022-06-07 ENCOUNTER — TELEPHONE (OUTPATIENT)
Dept: FAMILY MEDICINE | Facility: CLINIC | Age: 62
End: 2022-06-07

## 2022-06-07 DIAGNOSIS — Z79.899 ENCOUNTER FOR LONG-TERM (CURRENT) USE OF OTHER MEDICATIONS: ICD-10-CM

## 2022-06-07 DIAGNOSIS — R71.8 ELEVATED HEMATOCRIT: Primary | ICD-10-CM

## 2022-06-07 NOTE — TELEPHONE ENCOUNTER
----- Message from [facility], RT sent at 3/28/2022  4:17 PM CDT -----  Regarding: Lab due    Call patient.  His CBC shows he still has a high hematocrit at 55.1, needs to go donate 1 unit of blood at the blood center.  We can write him a new order for this if he needs 1. Goal is to get hematocrit to 50 or less.  Recheck CBC 3 months  Written by Edwin Cottrell MD on 3/27/2022  7:55 PM CDT

## 2022-06-07 NOTE — TELEPHONE ENCOUNTER
Spoke to patient that lab is due. Said we told him in portal message to come by end of month. Updated remind me.

## 2022-06-29 LAB
BASOPHILS # BLD AUTO: 39 CELLS/UL (ref 0–200)
BASOPHILS NFR BLD AUTO: 0.7 %
EOSINOPHIL # BLD AUTO: 143 CELLS/UL (ref 15–500)
EOSINOPHIL NFR BLD AUTO: 2.6 %
ERYTHROCYTE [DISTWIDTH] IN BLOOD BY AUTOMATED COUNT: 12.5 % (ref 11–15)
HCT VFR BLD AUTO: 53.2 % (ref 38.5–50)
HGB BLD-MCNC: 17.2 G/DL (ref 13.2–17.1)
LYMPHOCYTES # BLD AUTO: 1342 CELLS/UL (ref 850–3900)
LYMPHOCYTES NFR BLD AUTO: 24.4 %
MCH RBC QN AUTO: 29.5 PG (ref 27–33)
MCHC RBC AUTO-ENTMCNC: 32.3 G/DL (ref 32–36)
MCV RBC AUTO: 91.3 FL (ref 80–100)
MONOCYTES # BLD AUTO: 281 CELLS/UL (ref 200–950)
MONOCYTES NFR BLD AUTO: 5.1 %
NEUTROPHILS # BLD AUTO: 3696 CELLS/UL (ref 1500–7800)
NEUTROPHILS NFR BLD AUTO: 67.2 %
PLATELET # BLD AUTO: 158 THOUSAND/UL (ref 140–400)
PMV BLD REES-ECKER: 10 FL (ref 7.5–12.5)
RBC # BLD AUTO: 5.83 MILLION/UL (ref 4.2–5.8)
WBC # BLD AUTO: 5.5 THOUSAND/UL (ref 3.8–10.8)

## 2022-07-03 NOTE — PROGRESS NOTES
Call patient.  Hematocrit 53.2.  For hematocrit over 50 recommend a 1 unit blood donation at the blood center.  We can write an order for you to get this done.

## 2022-07-05 ENCOUNTER — TELEPHONE (OUTPATIENT)
Dept: FAMILY MEDICINE | Facility: CLINIC | Age: 62
End: 2022-07-05

## 2022-07-05 NOTE — LETTER
[address]  [place] [address]  Phone: [phone]   [phone]                        MD Doreen Sierra MD Chequita Williams, MD Matthew Bassett, PA-C Allison Hoffritz, NATALY Luna, NATALY Jacobs NP      Date: 07/05/2022        Patient: Edwin Conklin  YOB: 1960      Physicians' Orders:    Diagnosis: Polycythemia    Orders: Therapeutic lab draw every month for six months if HCT > 50.          Electronically Signed By: Edwin Cottrell MD

## 2022-07-05 NOTE — TELEPHONE ENCOUNTER
Spoke to pt verbatim per Dr. Cottrell. Pt voiced  understanding. Letter in portal section of [facility]. Pt voiced understanding.

## 2022-07-05 NOTE — TELEPHONE ENCOUNTER
----- Message from Edwin Cottrell MD sent at 7/3/2022  2:11 PM CDT -----  Call patient.  Hematocrit 53.2.  For hematocrit over 50 recommend a 1 unit blood donation at the blood center.  We can write an order for you to get this done.

## 2022-08-02 ENCOUNTER — OFFICE VISIT (OUTPATIENT)
Dept: DERMATOLOGY | Facility: CLINIC | Age: 62
End: 2022-08-02
Payer: COMMERCIAL

## 2022-08-02 VITALS — WEIGHT: 198 LBS | HEIGHT: 73 IN | BODY MASS INDEX: 26.24 KG/M2

## 2022-08-02 DIAGNOSIS — L57.0 ACTINIC KERATOSES: Primary | ICD-10-CM

## 2022-08-02 DIAGNOSIS — D18.01 CHERRY ANGIOMA: ICD-10-CM

## 2022-08-02 DIAGNOSIS — L81.4 SOLAR LENTIGO: ICD-10-CM

## 2022-08-02 DIAGNOSIS — L82.1 SEBORRHEIC KERATOSES: ICD-10-CM

## 2022-08-02 PROCEDURE — 17000 PR DESTRUCTION(LASER SURGERY,CRYOSURGERY,CHEMOSURGERY),PREMALIGNANT LESIONS,FIRST LESION: ICD-10-PCS | Mod: S$GLB,,, | Performed by: DERMATOLOGY

## 2022-08-02 PROCEDURE — 99213 PR OFFICE/OUTPT VISIT, EST, LEVL III, 20-29 MIN: ICD-10-PCS | Mod: 25,S$GLB,, | Performed by: DERMATOLOGY

## 2022-08-02 PROCEDURE — 1159F PR MEDICATION LIST DOCUMENTED IN MEDICAL RECORD: ICD-10-PCS | Mod: CPTII,S$GLB,, | Performed by: DERMATOLOGY

## 2022-08-02 PROCEDURE — 17000 DESTRUCT PREMALG LESION: CPT | Mod: S$GLB,,, | Performed by: DERMATOLOGY

## 2022-08-02 PROCEDURE — 17003 DESTRUCT PREMALG LES 2-14: CPT | Mod: S$GLB,,, | Performed by: DERMATOLOGY

## 2022-08-02 PROCEDURE — 99999 PR PBB SHADOW E&M-EST. PATIENT-LVL III: CPT | Mod: PBBFAC,,, | Performed by: DERMATOLOGY

## 2022-08-02 PROCEDURE — 99999 PR PBB SHADOW E&M-EST. PATIENT-LVL III: ICD-10-PCS | Mod: PBBFAC,,, | Performed by: DERMATOLOGY

## 2022-08-02 PROCEDURE — 17003 DESTRUCTION, PREMALIGNANT LESIONS; SECOND THROUGH 14 LESIONS: ICD-10-PCS | Mod: S$GLB,,, | Performed by: DERMATOLOGY

## 2022-08-02 PROCEDURE — 3008F BODY MASS INDEX DOCD: CPT | Mod: CPTII,S$GLB,, | Performed by: DERMATOLOGY

## 2022-08-02 PROCEDURE — 1160F RVW MEDS BY RX/DR IN RCRD: CPT | Mod: CPTII,S$GLB,, | Performed by: DERMATOLOGY

## 2022-08-02 PROCEDURE — 99213 OFFICE O/P EST LOW 20 MIN: CPT | Mod: 25,S$GLB,, | Performed by: DERMATOLOGY

## 2022-08-02 PROCEDURE — 1160F PR REVIEW ALL MEDS BY PRESCRIBER/CLIN PHARMACIST DOCUMENTED: ICD-10-PCS | Mod: CPTII,S$GLB,, | Performed by: DERMATOLOGY

## 2022-08-02 PROCEDURE — 1159F MED LIST DOCD IN RCRD: CPT | Mod: CPTII,S$GLB,, | Performed by: DERMATOLOGY

## 2022-08-02 PROCEDURE — 3008F PR BODY MASS INDEX (BMI) DOCUMENTED: ICD-10-PCS | Mod: CPTII,S$GLB,, | Performed by: DERMATOLOGY

## 2022-08-02 NOTE — PATIENT INSTRUCTIONS

## 2022-08-02 NOTE — PROGRESS NOTES
Subjective:       Patient ID:  Edwin Conklin is a 62 y.o. male who presents for   Chief Complaint   Patient presents with    Skin Check     tbsc    Spot     On back     LOV- 4/6/21- ak, sk angioma    Here today for a TBSC  C/o spot on back and left calf    Has no hx of NMSC  Has no fhx of MM    Current Outpatient Medications:     hydrocortisone 2.5 % cream, Apply topically 2 (two) times daily as needed., Disp: , Rfl:     testosterone cypionate (DEPOTESTOTERONE CYPIONATE) 200 mg/mL injection, Inject 1 mL (200 mg total) into the muscle every 14 (fourteen) days., Disp: 10 mL, Rfl: 1        Review of Systems   Constitutional: Negative for fever, chills and fatigue.   Respiratory: Negative for cough and shortness of breath.    Skin: Positive for itching, rash and wears hat. Negative for daily sunscreen use and activity-related sunscreen use.   Hematologic/Lymphatic: Does not bruise/bleed easily.        Objective:    Physical Exam   Constitutional: He appears well-developed and well-nourished. No distress.   Neurological: He is alert and oriented to person, place, and time. He is not disoriented.   Psychiatric: He has a normal mood and affect.   Skin:   Areas Examined (abnormalities noted in diagram):   Scalp / Hair Palpated and Inspected  Head / Face Inspection Performed  Neck Inspection Performed  Chest / Axilla Inspection Performed  Abdomen Inspection Performed  Back Inspection Performed  RUE Inspected  LUE Inspection Performed  Nails and Digits Inspection Performed                       Diagram Legend     Erythematous scaling macule/papule c/w actinic keratosis       Vascular papule c/w angioma      Pigmented verrucoid papule/plaque c/w seborrheic keratosis      Yellow umbilicated papule c/w sebaceous hyperplasia      Irregularly shaped tan macule c/w lentigo     1-2 mm smooth white papules consistent with Milia      Movable subcutaneous cyst with punctum c/w epidermal inclusion cyst      Subcutaneous  movable cyst c/w pilar cyst      Firm pink to brown papule c/w dermatofibroma      Pedunculated fleshy papule(s) c/w skin tag(s)      Evenly pigmented macule c/w junctional nevus     Mildly variegated pigmented, slightly irregular-bordered macule c/w mildly atypical nevus      Flesh colored to evenly pigmented papule c/w intradermal nevus       Pink pearly papule/plaque c/w basal cell carcinoma      Erythematous hyperkeratotic cursted plaque c/w SCC      Surgical scar with no sign of skin cancer recurrence      Open and closed comedones      Inflammatory papules and pustules      Verrucoid papule consistent consistent with wart     Erythematous eczematous patches and plaques     Dystrophic onycholytic nail with subungual debris c/w onychomycosis     Umbilicated papule    Erythematous-base heme-crusted tan verrucoid plaque consistent with inflamed seborrheic keratosis     Erythematous Silvery Scaling Plaque c/w Psoriasis     See annotation      Assessment / Plan:        Actinic keratoses  Cryosurgery Procedure Note    Verbal consent from the patient is obtained and the patient is aware of the precancerous quality and need for treatment of these lesions. Liquid nitrogen cryosurgery is applied to the 3 actinic keratoses, as detailed in the physical exam, to produce a freeze injury. The patient is aware that blisters may form and is instructed on wound care with gentle cleansing and use of vaseline ointment to keep moist until healed. The patient is supplied a handout on cryosurgery and is instructed to call if lesions do not completely resolve.    Seborrheic keratoses  These are benign inherited growths without a malignant potential. Reassurance given to patient. No treatment is necessary.     Solar lentigo  This is a benign hyperpigmented sun induced lesion. Daily sun protection will reduce the number of new lesions. Treatment of these benign lesions are considered cosmetic.    Cherry angioma  This is a benign  vascular lesion. Reassurance given. No treatment required.     Patient instructed in importance in daily broad spectrum sun protection of at least spf 30. Mineral sunscreen ingredients preferred (Zinc +/- Titanium) and can be found OTC.   Recommend Elta MD for daily use on face and neck.  Patient encouraged to wear hat for all outdoor exposure.   Also discussed sun avoidance and use of protective clothing.               Follow up in about 1 year (around 8/2/2023), or if symptoms worsen or fail to improve.

## 2022-08-11 DIAGNOSIS — R79.89 LOW SERUM TESTOSTERONE LEVEL: ICD-10-CM

## 2022-08-12 RX ORDER — TESTOSTERONE CYPIONATE 200 MG/ML
200 INJECTION, SOLUTION INTRAMUSCULAR
Qty: 4 ML | Refills: 0 | Status: SHIPPED | OUTPATIENT
Start: 2022-08-12 | End: 2022-10-05 | Stop reason: SDUPTHER

## 2022-08-29 ENCOUNTER — TELEPHONE (OUTPATIENT)
Dept: FAMILY MEDICINE | Facility: CLINIC | Age: 62
End: 2022-08-29

## 2022-08-29 NOTE — TELEPHONE ENCOUNTER
"Spoke with pt who states little blisters that itch came up on his arm. On Friday they "popped" and started oozing yellow. Below and above his arm is a little red. Reports they are not painful. Refused appointment today, scheduled for tomorrow.   "

## 2022-08-29 NOTE — TELEPHONE ENCOUNTER
----- Message from Emily Melton MA sent at 8/29/2022  8:23 AM CDT -----  Pt calling for an appointment to be seen for blisters on the inside bend on his elbow. Pt willing to see anyone available. # [phone]     02/18/2020 Patient has [facility] Medicaid , and does not require a referral for specialist, Will notify provider for a provider referral Jose Juan Smith LPN

## 2022-08-30 ENCOUNTER — OFFICE VISIT (OUTPATIENT)
Dept: FAMILY MEDICINE | Facility: CLINIC | Age: 62
End: 2022-08-30
Payer: COMMERCIAL

## 2022-08-30 VITALS
DIASTOLIC BLOOD PRESSURE: 66 MMHG | TEMPERATURE: 98 F | HEIGHT: 73 IN | SYSTOLIC BLOOD PRESSURE: 134 MMHG | WEIGHT: 204 LBS | BODY MASS INDEX: 27.04 KG/M2 | HEART RATE: 64 BPM

## 2022-08-30 DIAGNOSIS — N13.8 BPH WITH OBSTRUCTION/LOWER URINARY TRACT SYMPTOMS: ICD-10-CM

## 2022-08-30 DIAGNOSIS — L30.9 ECZEMA, UNSPECIFIED TYPE: Primary | ICD-10-CM

## 2022-08-30 DIAGNOSIS — N40.1 BPH WITH OBSTRUCTION/LOWER URINARY TRACT SYMPTOMS: ICD-10-CM

## 2022-08-30 DIAGNOSIS — G47.33 OSA ON CPAP: ICD-10-CM

## 2022-08-30 PROCEDURE — 3075F SYST BP GE 130 - 139MM HG: CPT | Mod: CPTII,S$GLB,, | Performed by: PHYSICIAN ASSISTANT

## 2022-08-30 PROCEDURE — 1159F MED LIST DOCD IN RCRD: CPT | Mod: CPTII,S$GLB,, | Performed by: PHYSICIAN ASSISTANT

## 2022-08-30 PROCEDURE — 3078F DIAST BP <80 MM HG: CPT | Mod: CPTII,S$GLB,, | Performed by: PHYSICIAN ASSISTANT

## 2022-08-30 PROCEDURE — 99213 OFFICE O/P EST LOW 20 MIN: CPT | Mod: S$GLB,,, | Performed by: PHYSICIAN ASSISTANT

## 2022-08-30 PROCEDURE — 3078F PR MOST RECENT DIASTOLIC BLOOD PRESSURE < 80 MM HG: ICD-10-PCS | Mod: CPTII,S$GLB,, | Performed by: PHYSICIAN ASSISTANT

## 2022-08-30 PROCEDURE — 3075F PR MOST RECENT SYSTOLIC BLOOD PRESS GE 130-139MM HG: ICD-10-PCS | Mod: CPTII,S$GLB,, | Performed by: PHYSICIAN ASSISTANT

## 2022-08-30 PROCEDURE — 3008F PR BODY MASS INDEX (BMI) DOCUMENTED: ICD-10-PCS | Mod: CPTII,S$GLB,, | Performed by: PHYSICIAN ASSISTANT

## 2022-08-30 PROCEDURE — 1160F PR REVIEW ALL MEDS BY PRESCRIBER/CLIN PHARMACIST DOCUMENTED: ICD-10-PCS | Mod: CPTII,S$GLB,, | Performed by: PHYSICIAN ASSISTANT

## 2022-08-30 PROCEDURE — 1160F RVW MEDS BY RX/DR IN RCRD: CPT | Mod: CPTII,S$GLB,, | Performed by: PHYSICIAN ASSISTANT

## 2022-08-30 PROCEDURE — 1159F PR MEDICATION LIST DOCUMENTED IN MEDICAL RECORD: ICD-10-PCS | Mod: CPTII,S$GLB,, | Performed by: PHYSICIAN ASSISTANT

## 2022-08-30 PROCEDURE — 3008F BODY MASS INDEX DOCD: CPT | Mod: CPTII,S$GLB,, | Performed by: PHYSICIAN ASSISTANT

## 2022-08-30 PROCEDURE — 99213 PR OFFICE/OUTPT VISIT, EST, LEVL III, 20-29 MIN: ICD-10-PCS | Mod: S$GLB,,, | Performed by: PHYSICIAN ASSISTANT

## 2022-08-30 RX ORDER — FLUOCINONIDE 0.5 MG/G
CREAM TOPICAL 2 TIMES DAILY
Qty: 30 G | Refills: 0 | Status: SHIPPED | OUTPATIENT
Start: 2022-08-30 | End: 2022-10-05

## 2022-08-30 NOTE — PROGRESS NOTES
"    SUBJECTIVE:    Patient ID: Edwin Conklin is a 62 y.o. male.    Chief Complaint: Rash (Blisters in bend of left arm, itching, and oozing, since last Monday, did put calamine lotion, and neosporin, no bottles// SW )    Pt is a 62 y.o. male who presents today with a CC of "a rash on the left arm." 8 days ago he experienced a gradual onset of an erythematous rash located in the antecubital fossa of the left arm.  He reports small, vesicular blisters in the center of the rash.  It occasionally itches in nature.  In attempt to alleviate this rash, he has been applying Calamine lotion and Neosporin, but has not experience significant relief.  He denies any puncture wounds to the site, known insect bites, or coming into contact with any known allergen.    Telephone on 06/07/2022   Component Date Value Ref Range Status    WBC 06/28/2022 5.5  3.8 - 10.8 Thousand/uL Final    RBC 06/28/2022 5.83 (H)  4.20 - 5.80 Million/uL Final    Hemoglobin 06/28/2022 17.2 (H)  13.2 - 17.1 g/dL Final    Hematocrit 06/28/2022 53.2 (H)  38.5 - 50.0 % Final    MCV 06/28/2022 91.3  80.0 - 100.0 fL Final    MCH 06/28/2022 29.5  27.0 - 33.0 pg Final    MCHC 06/28/2022 32.3  32.0 - 36.0 g/dL Final    RDW 06/28/2022 12.5  11.0 - 15.0 % Final    Platelets 06/28/2022 158  140 - 400 Thousand/uL Final    MPV 06/28/2022 10.0  7.5 - 12.5 fL Final    Neutrophils, Abs 06/28/2022 3,696  1,500 - 7,800 cells/uL Final    Lymph # 06/28/2022 1,342  850 - 3,900 cells/uL Final    Mono # 06/28/2022 281  200 - 950 cells/uL Final    Eos # 06/28/2022 143  15 - 500 cells/uL Final    Baso # 06/28/2022 39  0 - 200 cells/uL Final    Neutrophils Relative 06/28/2022 67.2  % Final    Lymph % 06/28/2022 24.4  % Final    Mono % 06/28/2022 5.1  % Final    Eosinophil % 06/28/2022 2.6  % Final    Basophil % 06/28/2022 0.7  % Final   Telephone on 03/22/2022   Component Date Value Ref Range Status    WBC 03/24/2022 6.5  3.8 - 10.8 Thousand/uL Final    RBC 03/24/2022 6.12 " (H)  4.20 - 5.80 Million/uL Final    Hemoglobin 03/24/2022 17.9 (H)  13.2 - 17.1 g/dL Final    Hematocrit 03/24/2022 55.1 (H)  38.5 - 50.0 % Final    MCV 03/24/2022 90.0  80.0 - 100.0 fL Final    MCH 03/24/2022 29.2  27.0 - 33.0 pg Final    MCHC 03/24/2022 32.5  32.0 - 36.0 g/dL Final    RDW 03/24/2022 13.7  11.0 - 15.0 % Final    Platelets 03/24/2022 191  140 - 400 Thousand/uL Final    MPV 03/24/2022 9.2  7.5 - 12.5 fL Final    Neutrophils, Abs 03/24/2022 3,894  1,500 - 7,800 cells/uL Final    Lymph # 03/24/2022 1,547  850 - 3,900 cells/uL Final    Mono # 03/24/2022 787  200 - 950 cells/uL Final    Eos # 03/24/2022 202  15 - 500 cells/uL Final    Baso # 03/24/2022 72  0 - 200 cells/uL Final    Neutrophils Relative 03/24/2022 59.9  % Final    Lymph % 03/24/2022 23.8  % Final    Mono % 03/24/2022 12.1  % Final    Eosinophil % 03/24/2022 3.1  % Final    Basophil % 03/24/2022 1.1  % Final       Past Medical History:   Diagnosis Date    Allergy     BPH (benign prostatic hypertrophy)     Low serum testosterone level     Seminal vesiculitis     Testosterone deficiency     Viral hepatitis A without mention of hepatic coma      Past Surgical History:   Procedure Laterality Date    EYE SURGERY Bilateral     Lasik    KNEE ARTHROSCOPY Left 05/27/2021    partial MM     ROTATOR CUFF REPAIR  02/2013    left shoulder  per Dr. Jigar Greene    VASECTOMY       Family History   Problem Relation Age of Onset    Alzheimer's disease Father     Stroke Father     Dementia Mother     Emphysema Sister     COPD Sister         ex smoker    No Known Problems Brother     Eczema Neg Hx     Lupus Neg Hx     Psoriasis Neg Hx     Melanoma Neg Hx        Marital Status:   Alcohol History:  reports current alcohol use.  Tobacco History:  reports that he quit smoking about 47 years ago. His smoking use included cigarettes. He started smoking about 48 years ago. He has a 0.06 pack-year smoking history. He has never used smokeless tobacco.  Drug  "History:  reports no history of drug use.    Health Maintenance Topics with due status: Not Due       Topic Last Completion Date    TETANUS VACCINE 06/12/2016    Colorectal Cancer Screening 12/13/2021    Lipid Panel 12/23/2021     Immunization History   Administered Date(s) Administered    COVID-19, MRNA, LN-S, PF (Pfizer) (Purple Cap) 04/27/2021, 05/20/2021    Tdap 06/12/2016       Review of patient's allergies indicates:  No Known Allergies    Current Outpatient Medications:     fluocinonide 0.05% (LIDEX) 0.05 % cream, Apply topically 2 (two) times daily. for 7 days, Disp: 30 g, Rfl: 0    hydrocortisone 2.5 % cream, Apply topically 2 (two) times daily as needed., Disp: , Rfl:     testosterone cypionate (DEPOTESTOTERONE CYPIONATE) 200 mg/mL injection, Inject 1 mL (200 mg total) into the muscle every 14 (fourteen) days., Disp: 4 mL, Rfl: 0    Review of Systems   Constitutional:  Negative for chills, fatigue and fever.   Respiratory:  Negative for cough, shortness of breath and wheezing.    Cardiovascular:  Negative for chest pain and palpitations.   Gastrointestinal:  Negative for abdominal pain, constipation, diarrhea, nausea and vomiting.   Genitourinary:  Negative for dysuria.   Musculoskeletal:  Negative for arthralgias and myalgias.   Skin:  Positive for rash.   Neurological:  Negative for dizziness, syncope, weakness and light-headedness.   Psychiatric/Behavioral:  Negative for behavioral problems.         Objective:      Vitals:    08/30/22 1552   BP: 134/66   Pulse: 64   Temp: 98.1 °F (36.7 °C)   Weight: 92.5 kg (204 lb)   Height: 6' 1" (1.854 m)     Physical Exam  Vitals reviewed.   Constitutional:       General: He is not in acute distress.     Appearance: Normal appearance. He is not ill-appearing.   HENT:      Head: Normocephalic and atraumatic.      Right Ear: External ear normal.      Left Ear: External ear normal.      Nose: No rhinorrhea.      Mouth/Throat:      Mouth: Mucous membranes are moist.     "  Pharynx: Oropharynx is clear.   Eyes:      General: No scleral icterus.     Conjunctiva/sclera: Conjunctivae normal.   Cardiovascular:      Rate and Rhythm: Normal rate and regular rhythm.      Pulses: Normal pulses.      Heart sounds: Normal heart sounds. No murmur heard.    No friction rub.   Pulmonary:      Effort: Pulmonary effort is normal. No respiratory distress.      Breath sounds: Normal breath sounds. No wheezing, rhonchi or rales.   Abdominal:      Palpations: Abdomen is soft.      Tenderness: There is no abdominal tenderness. There is no guarding or rebound.   Musculoskeletal:         General: Normal range of motion.      Right lower leg: No edema.      Left lower leg: No edema.   Skin:     General: Skin is warm and dry.      Findings: Rash present. Rash is vesicular.          Neurological:      Mental Status: He is alert. Mental status is at baseline.      Gait: Gait normal.   Psychiatric:         Mood and Affect: Mood normal.         Behavior: Behavior is cooperative.         Assessment:       1. Eczema, unspecified type    2. BPH with obstruction/lower urinary tract symptoms    3. KATINA on CPAP           Plan:       Eczema, unspecified type  Eczema vs. Contact Dermatitis  Begin applying Lidex cream b.i.d. x7 days.  Offered/recommended a one-week follow-up, but patient declined stating he will contact the office if symptoms worsen or fail to improve.  -     fluocinonide 0.05% (LIDEX) 0.05 % cream; Apply topically 2 (two) times daily. for 7 days  Dispense: 30 g; Refill: 0    BPH with obstruction/lower urinary tract symptoms    KATINA on CPAP    Follow up if symptoms worsen or fail to improve, for Dermatitis.        8/30/2022 Brad Weeks PA-C

## 2022-09-01 DIAGNOSIS — Z12.5 SCREENING FOR PROSTATE CANCER: ICD-10-CM

## 2022-09-01 DIAGNOSIS — R79.89 LOW SERUM TESTOSTERONE LEVEL: Primary | ICD-10-CM

## 2022-09-21 LAB
PSA SERPL-MCNC: 0.36 NG/ML
TESTOST SERPL-MCNC: 681 NG/DL (ref 250–827)

## 2022-10-05 ENCOUNTER — OFFICE VISIT (OUTPATIENT)
Dept: UROLOGY | Facility: CLINIC | Age: 62
End: 2022-10-05
Payer: COMMERCIAL

## 2022-10-05 VITALS
HEIGHT: 73 IN | HEART RATE: 66 BPM | WEIGHT: 198 LBS | BODY MASS INDEX: 26.24 KG/M2 | SYSTOLIC BLOOD PRESSURE: 115 MMHG | DIASTOLIC BLOOD PRESSURE: 71 MMHG

## 2022-10-05 DIAGNOSIS — R39.9 LOWER URINARY TRACT SYMPTOMS (LUTS): ICD-10-CM

## 2022-10-05 DIAGNOSIS — R79.89 LOW SERUM TESTOSTERONE LEVEL: Primary | ICD-10-CM

## 2022-10-05 PROCEDURE — 3008F PR BODY MASS INDEX (BMI) DOCUMENTED: ICD-10-PCS | Mod: CPTII,S$GLB,, | Performed by: UROLOGY

## 2022-10-05 PROCEDURE — 3078F PR MOST RECENT DIASTOLIC BLOOD PRESSURE < 80 MM HG: ICD-10-PCS | Mod: CPTII,S$GLB,, | Performed by: UROLOGY

## 2022-10-05 PROCEDURE — 1159F MED LIST DOCD IN RCRD: CPT | Mod: CPTII,S$GLB,, | Performed by: UROLOGY

## 2022-10-05 PROCEDURE — 1160F PR REVIEW ALL MEDS BY PRESCRIBER/CLIN PHARMACIST DOCUMENTED: ICD-10-PCS | Mod: CPTII,S$GLB,, | Performed by: UROLOGY

## 2022-10-05 PROCEDURE — 1160F RVW MEDS BY RX/DR IN RCRD: CPT | Mod: CPTII,S$GLB,, | Performed by: UROLOGY

## 2022-10-05 PROCEDURE — 99214 PR OFFICE/OUTPT VISIT, EST, LEVL IV, 30-39 MIN: ICD-10-PCS | Mod: S$GLB,,, | Performed by: UROLOGY

## 2022-10-05 PROCEDURE — 99999 PR PBB SHADOW E&M-EST. PATIENT-LVL III: ICD-10-PCS | Mod: PBBFAC,,, | Performed by: UROLOGY

## 2022-10-05 PROCEDURE — 3074F PR MOST RECENT SYSTOLIC BLOOD PRESSURE < 130 MM HG: ICD-10-PCS | Mod: CPTII,S$GLB,, | Performed by: UROLOGY

## 2022-10-05 PROCEDURE — 99999 PR PBB SHADOW E&M-EST. PATIENT-LVL III: CPT | Mod: PBBFAC,,, | Performed by: UROLOGY

## 2022-10-05 PROCEDURE — 99214 OFFICE O/P EST MOD 30 MIN: CPT | Mod: S$GLB,,, | Performed by: UROLOGY

## 2022-10-05 PROCEDURE — 3074F SYST BP LT 130 MM HG: CPT | Mod: CPTII,S$GLB,, | Performed by: UROLOGY

## 2022-10-05 PROCEDURE — 3008F BODY MASS INDEX DOCD: CPT | Mod: CPTII,S$GLB,, | Performed by: UROLOGY

## 2022-10-05 PROCEDURE — 3078F DIAST BP <80 MM HG: CPT | Mod: CPTII,S$GLB,, | Performed by: UROLOGY

## 2022-10-05 PROCEDURE — 1159F PR MEDICATION LIST DOCUMENTED IN MEDICAL RECORD: ICD-10-PCS | Mod: CPTII,S$GLB,, | Performed by: UROLOGY

## 2022-10-05 RX ORDER — ACETAMINOPHEN AND CODEINE PHOSPHATE 300; 30 MG/1; MG/1
1-2 TABLET ORAL NIGHTLY PRN
COMMUNITY
Start: 2022-07-25

## 2022-10-05 RX ORDER — TESTOSTERONE CYPIONATE 200 MG/ML
200 INJECTION, SOLUTION INTRAMUSCULAR
Qty: 5 ML | Refills: 4 | Status: SHIPPED | OUTPATIENT
Start: 2022-10-05 | End: 2023-04-10

## 2022-10-05 NOTE — PROGRESS NOTES
[facility] Established Patient/H&P:    Edwin Conklin is a 62 y.o. male who presents for follow up for low testosterone and lower urinary tract symptoms.     Patient has been managed by Dr. Jason Hernandez for low testosterone with injections of 100 mg every 2 weeks. States that he has been managed well with this prescription.      He reports moderate lower urinary tract symptoms - frequency, intermittency and nocturia x 2 that is not bothersome. States he drinks fluid up until bed time.    Works as a .         Interval History     10/13/21: Testerone increased to 200 mg every 2 weeks. His testosterone has improved to 469 in 10/2021 from 198 after increasing his dosage. States his fatigue, lethargy and erectile dysfunction has improved.     10/5/22: He remains on Testosterone 200 mg every 2 weeks. His levels have increased to 681 most recently. Fatigue, lethargy and ED have continued to improve. Does not require any medication for his ED. Lower urinary tract symptoms stable. Of note, he has been giving blood every 3 months per his PCP for elevated hematocrit.     Denies any gross hematuria, fever, chills, nephrolithiasis,  trauma or  malignancy.         PSA  0.36   0.15                 10/13/21  0.16                 10/11/19     Testosterone  681  9/19/22  469                  10/13/21  198                  3/23/21  496                  3/13/20  130                  1/16/20  295                  11/21/14     IPSS    QoL  7 1 10/5/22  14        1          3/19/21    Past Medical History:   Diagnosis Date    Allergy     BPH (benign prostatic hypertrophy)     Low serum testosterone level     Seminal vesiculitis     Testosterone deficiency     Viral hepatitis A without mention of hepatic coma        Past Surgical History:   Procedure Laterality Date    EYE SURGERY Bilateral     Lasik    KNEE ARTHROSCOPY Left 05/27/2021    partial MM     ROTATOR CUFF REPAIR  02/2013    left  "shoulder  per Dr. Jigar Greene    VASECTOMY         Review of patient's allergies indicates:  No Known Allergies    Medications Reviewed: see MAR    FOCUSED PHYSICAL EXAM:    Vitals:    10/05/22 0905   BP: 115/71   Pulse: 66     Body mass index is 26.12 kg/m². Weight: 89.8 kg (198 lb) Height: 6' 1" (185.4 cm)       General: Alert, cooperative, no distress, appears stated age  Abdomen: Soft, non-tender, no CVA tenderness, non-distended  YNES: Declined          LABS:    No results found for this or any previous visit (from the past 336 hour(s)).        Assessment/Diagnosis:    1. Low serum testosterone level  testosterone cypionate (DEPOTESTOTERONE CYPIONATE) 200 mg/mL injection      2. Lower urinary tract symptoms (LUTS)            Plans:    - I spent 30 minutes of the day of this encounter preparing for, treating and managing this patient. Discussed the risks and benefits of testosterone replacement today.  We went over different testosterone treatments. This included possible cardiac risks.  Patient wants to continue with injections.  - Continue Testosterone 200 mg every 2 weeks - refills ordered.  - Continue conservative management for LUTS.   - Continue therapeutic phlebotomy per his PCP every 3 months.   - RTC in 1 year with CBC, PSA and testosterone.       "

## 2022-12-08 ENCOUNTER — PATIENT MESSAGE (OUTPATIENT)
Dept: FAMILY MEDICINE | Facility: CLINIC | Age: 62
End: 2022-12-08

## 2022-12-08 DIAGNOSIS — Z79.899 ENCOUNTER FOR LONG-TERM (CURRENT) USE OF OTHER MEDICATIONS: Primary | ICD-10-CM

## 2022-12-08 DIAGNOSIS — N13.8 BPH WITH OBSTRUCTION/LOWER URINARY TRACT SYMPTOMS: ICD-10-CM

## 2022-12-08 DIAGNOSIS — Z00.00 WELLNESS EXAMINATION: ICD-10-CM

## 2022-12-08 DIAGNOSIS — N40.1 BPH WITH OBSTRUCTION/LOWER URINARY TRACT SYMPTOMS: ICD-10-CM

## 2022-12-17 LAB
ALBUMIN SERPL-MCNC: 4 G/DL (ref 3.6–5.1)
ALBUMIN/GLOB SERPL: 1.3 (CALC) (ref 1–2.5)
ALP SERPL-CCNC: 43 U/L (ref 35–144)
ALT SERPL-CCNC: 19 U/L (ref 9–46)
APPEARANCE UR: CLEAR
AST SERPL-CCNC: 15 U/L (ref 10–35)
BACTERIA #/AREA URNS HPF: NORMAL /HPF
BACTERIA UR CULT: NORMAL
BASOPHILS # BLD AUTO: 67 CELLS/UL (ref 0–200)
BASOPHILS NFR BLD AUTO: 1.1 %
BILIRUB SERPL-MCNC: 0.4 MG/DL (ref 0.2–1.2)
BILIRUB UR QL STRIP: NEGATIVE
BUN SERPL-MCNC: 11 MG/DL (ref 7–25)
BUN/CREAT SERPL: NORMAL (CALC) (ref 6–22)
CALCIUM SERPL-MCNC: 8.9 MG/DL (ref 8.6–10.3)
CHLORIDE SERPL-SCNC: 103 MMOL/L (ref 98–110)
CHOLEST SERPL-MCNC: 172 MG/DL
CHOLEST/HDLC SERPL: 4.8 (CALC)
CO2 SERPL-SCNC: 31 MMOL/L (ref 20–32)
COLOR UR: YELLOW
CREAT SERPL-MCNC: 1.16 MG/DL (ref 0.7–1.35)
EGFR: 71 ML/MIN/1.73M2
EOSINOPHIL # BLD AUTO: 159 CELLS/UL (ref 15–500)
EOSINOPHIL NFR BLD AUTO: 2.6 %
ERYTHROCYTE [DISTWIDTH] IN BLOOD BY AUTOMATED COUNT: 13.8 % (ref 11–15)
GLOBULIN SER CALC-MCNC: 3 G/DL (CALC) (ref 1.9–3.7)
GLUCOSE SERPL-MCNC: 83 MG/DL (ref 65–99)
GLUCOSE UR QL STRIP: NEGATIVE
HCT VFR BLD AUTO: 49.8 % (ref 38.5–50)
HDLC SERPL-MCNC: 36 MG/DL
HGB BLD-MCNC: 16.8 G/DL (ref 13.2–17.1)
HGB UR QL STRIP: NEGATIVE
HYALINE CASTS #/AREA URNS LPF: NORMAL /LPF
KETONES UR QL STRIP: NEGATIVE
LDLC SERPL CALC-MCNC: 110 MG/DL (CALC)
LEUKOCYTE ESTERASE UR QL STRIP: NEGATIVE
LYMPHOCYTES # BLD AUTO: 1452 CELLS/UL (ref 850–3900)
LYMPHOCYTES NFR BLD AUTO: 23.8 %
MCH RBC QN AUTO: 30.2 PG (ref 27–33)
MCHC RBC AUTO-ENTMCNC: 33.7 G/DL (ref 32–36)
MCV RBC AUTO: 89.4 FL (ref 80–100)
MONOCYTES # BLD AUTO: 628 CELLS/UL (ref 200–950)
MONOCYTES NFR BLD AUTO: 10.3 %
NEUTROPHILS # BLD AUTO: 3794 CELLS/UL (ref 1500–7800)
NEUTROPHILS NFR BLD AUTO: 62.2 %
NITRITE UR QL STRIP: NEGATIVE
NONHDLC SERPL-MCNC: 136 MG/DL (CALC)
PH UR STRIP: 5.5 [PH] (ref 5–8)
PLATELET # BLD AUTO: 182 THOUSAND/UL (ref 140–400)
PMV BLD REES-ECKER: 9.4 FL (ref 7.5–12.5)
POTASSIUM SERPL-SCNC: 4.2 MMOL/L (ref 3.5–5.3)
PROT SERPL-MCNC: 7 G/DL (ref 6.1–8.1)
PROT UR QL STRIP: NEGATIVE
RBC # BLD AUTO: 5.57 MILLION/UL (ref 4.2–5.8)
RBC #/AREA URNS HPF: NORMAL /HPF
SERVICE CMNT-IMP: NORMAL
SODIUM SERPL-SCNC: 140 MMOL/L (ref 135–146)
SP GR UR STRIP: 1.02 (ref 1–1.03)
SQUAMOUS #/AREA URNS HPF: NORMAL /HPF
TRIGL SERPL-MCNC: 146 MG/DL
TSH SERPL-ACNC: 2.59 MIU/L (ref 0.4–4.5)
WBC # BLD AUTO: 6.1 THOUSAND/UL (ref 3.8–10.8)
WBC #/AREA URNS HPF: NORMAL /HPF

## 2022-12-27 ENCOUNTER — OFFICE VISIT (OUTPATIENT)
Dept: FAMILY MEDICINE | Facility: CLINIC | Age: 62
End: 2022-12-27
Payer: COMMERCIAL

## 2022-12-27 VITALS
WEIGHT: 215 LBS | SYSTOLIC BLOOD PRESSURE: 124 MMHG | DIASTOLIC BLOOD PRESSURE: 72 MMHG | HEIGHT: 73 IN | HEART RATE: 73 BPM | BODY MASS INDEX: 28.49 KG/M2

## 2022-12-27 DIAGNOSIS — N13.8 BPH WITH OBSTRUCTION/LOWER URINARY TRACT SYMPTOMS: ICD-10-CM

## 2022-12-27 DIAGNOSIS — N40.1 BPH WITH OBSTRUCTION/LOWER URINARY TRACT SYMPTOMS: ICD-10-CM

## 2022-12-27 DIAGNOSIS — G47.33 OSA ON CPAP: ICD-10-CM

## 2022-12-27 DIAGNOSIS — Z00.00 WELLNESS EXAMINATION: Primary | ICD-10-CM

## 2022-12-27 DIAGNOSIS — D75.1 POLYCYTHEMIA: ICD-10-CM

## 2022-12-27 DIAGNOSIS — R79.89 LOW SERUM TESTOSTERONE LEVEL: ICD-10-CM

## 2022-12-27 PROCEDURE — 3008F PR BODY MASS INDEX (BMI) DOCUMENTED: ICD-10-PCS | Mod: CPTII,S$GLB,, | Performed by: FAMILY MEDICINE

## 2022-12-27 PROCEDURE — 3074F SYST BP LT 130 MM HG: CPT | Mod: CPTII,S$GLB,, | Performed by: FAMILY MEDICINE

## 2022-12-27 PROCEDURE — 3074F PR MOST RECENT SYSTOLIC BLOOD PRESSURE < 130 MM HG: ICD-10-PCS | Mod: CPTII,S$GLB,, | Performed by: FAMILY MEDICINE

## 2022-12-27 PROCEDURE — 99396 PR PREVENTIVE VISIT,EST,40-64: ICD-10-PCS | Mod: S$GLB,,, | Performed by: FAMILY MEDICINE

## 2022-12-27 PROCEDURE — 1159F MED LIST DOCD IN RCRD: CPT | Mod: CPTII,S$GLB,, | Performed by: FAMILY MEDICINE

## 2022-12-27 PROCEDURE — 3008F BODY MASS INDEX DOCD: CPT | Mod: CPTII,S$GLB,, | Performed by: FAMILY MEDICINE

## 2022-12-27 PROCEDURE — 99396 PREV VISIT EST AGE 40-64: CPT | Mod: S$GLB,,, | Performed by: FAMILY MEDICINE

## 2022-12-27 PROCEDURE — 3078F PR MOST RECENT DIASTOLIC BLOOD PRESSURE < 80 MM HG: ICD-10-PCS | Mod: CPTII,S$GLB,, | Performed by: FAMILY MEDICINE

## 2022-12-27 PROCEDURE — 3078F DIAST BP <80 MM HG: CPT | Mod: CPTII,S$GLB,, | Performed by: FAMILY MEDICINE

## 2022-12-27 PROCEDURE — 1159F PR MEDICATION LIST DOCUMENTED IN MEDICAL RECORD: ICD-10-PCS | Mod: CPTII,S$GLB,, | Performed by: FAMILY MEDICINE

## 2022-12-27 NOTE — PROGRESS NOTES
SUBJECTIVE:    Patient ID: Edwin Conklin is a 62 y.o. male.    Chief Complaint: Follow-up (No bottle, Lab-results, abc )    62-year-old male here for his yearly physical.  He still works for [employer] in sales.  He stays active by going to the gym 3 times a week.  He jogs on the treadmill and does some calisthenics.  Occasionally he bowls, mows the lawn during the summer.    He lost quite a bit of weight but has gained 5-10 lb back, he has been eating weight watchers pre prepared meals.    Hypogonadism-Dr. Ameya Ruiz, testosterone injections q.2 weeks.  Some polycythemia from the testosterone and has to donate blood 3 times a year.  He has seen Dr. Chan in the past for this.    He is a nonsmoker, occasionally drinks beer on the weekends.    KATINA-compliant with CPAP.  Would like a new CPAP machine at 6.5 pressure.    2021-colonoscopy with Dr. Roblero-RTC 10 years    Herpes zoster in July of 2022, left shoulder region outbreak    He declines any flu vaccines.      Patient Message on 12/08/2022   Component Date Value Ref Range Status    WBC 12/16/2022 6.1  3.8 - 10.8 Thousand/uL Final    RBC 12/16/2022 5.57  4.20 - 5.80 Million/uL Final    Hemoglobin 12/16/2022 16.8  13.2 - 17.1 g/dL Final    Hematocrit 12/16/2022 49.8  38.5 - 50.0 % Final    MCV 12/16/2022 89.4  80.0 - 100.0 fL Final    MCH 12/16/2022 30.2  27.0 - 33.0 pg Final    MCHC 12/16/2022 33.7  32.0 - 36.0 g/dL Final    RDW 12/16/2022 13.8  11.0 - 15.0 % Final    Platelets 12/16/2022 182  140 - 400 Thousand/uL Final    MPV 12/16/2022 9.4  7.5 - 12.5 fL Final    Neutrophils, Abs 12/16/2022 3,794  1,500 - 7,800 cells/uL Final    Lymph # 12/16/2022 1,452  850 - 3,900 cells/uL Final    Mono # 12/16/2022 628  200 - 950 cells/uL Final    Eos # 12/16/2022 159  15 - 500 cells/uL Final    Baso # 12/16/2022 67  0 - 200 cells/uL Final    Neutrophils Relative 12/16/2022 62.2  % Final    Lymph % 12/16/2022 23.8  % Final    Mono % 12/16/2022 10.3  % Final     Eosinophil % 12/16/2022 2.6  % Final    Basophil % 12/16/2022 1.1  % Final    Glucose 12/16/2022 83  65 - 99 mg/dL Final    BUN 12/16/2022 11  7 - 25 mg/dL Final    Creatinine 12/16/2022 1.16  0.70 - 1.35 mg/dL Final    eGFR 12/16/2022 71  > OR = 60 mL/min/1.73m2 Final    BUN/Creatinine Ratio 12/16/2022 NOT APPLICABLE  6 - 22 (calc) Final    Sodium 12/16/2022 140  135 - 146 mmol/L Final    Potassium 12/16/2022 4.2  3.5 - 5.3 mmol/L Final    Chloride 12/16/2022 103  98 - 110 mmol/L Final    CO2 12/16/2022 31  20 - 32 mmol/L Final    Calcium 12/16/2022 8.9  8.6 - 10.3 mg/dL Final    Total Protein 12/16/2022 7.0  6.1 - 8.1 g/dL Final    Albumin 12/16/2022 4.0  3.6 - 5.1 g/dL Final    Globulin, Total 12/16/2022 3.0  1.9 - 3.7 g/dL (calc) Final    Albumin/Globulin Ratio 12/16/2022 1.3  1.0 - 2.5 (calc) Final    Total Bilirubin 12/16/2022 0.4  0.2 - 1.2 mg/dL Final    Alkaline Phosphatase 12/16/2022 43  35 - 144 U/L Final    AST 12/16/2022 15  10 - 35 U/L Final    ALT 12/16/2022 19  9 - 46 U/L Final    Cholesterol 12/16/2022 172  <200 mg/dL Final    HDL 12/16/2022 36 (L)  > OR = 40 mg/dL Final    Triglycerides 12/16/2022 146  <150 mg/dL Final    LDL Cholesterol 12/16/2022 110 (H)  mg/dL (calc) Final    HDL/Cholesterol Ratio 12/16/2022 4.8  <5.0 (calc) Final    Non HDL Chol. (LDL+VLDL) 12/16/2022 136 (H)  <130 mg/dL (calc) Final    TSH w/reflex to FT4 12/16/2022 2.59  0.40 - 4.50 mIU/L Final    Color, UA 12/16/2022 YELLOW  YELLOW Final    Appearance, UA 12/16/2022 CLEAR  CLEAR Final    Specific Gravity, UA 12/16/2022 1.020  1.001 - 1.035 Final    pH, UA 12/16/2022 5.5  5.0 - 8.0 Final    Glucose, UA 12/16/2022 NEGATIVE  NEGATIVE Final    Bilirubin, UA 12/16/2022 NEGATIVE  NEGATIVE Final    Ketones, UA 12/16/2022 NEGATIVE  NEGATIVE Final    Occult Blood UA 12/16/2022 NEGATIVE  NEGATIVE Final    Protein, UA 12/16/2022 NEGATIVE  NEGATIVE Final    Nitrite, UA 12/16/2022 NEGATIVE  NEGATIVE Final    Leukocytes, UA 12/16/2022  NEGATIVE  NEGATIVE Final    WBC Casts, UA 2022 NONE SEEN  < OR = 5 /HPF Final    RBC Casts, UA 2022 NONE SEEN  < OR = 2 /HPF Final    Squam Epithel, UA 2022 NONE SEEN  < OR = 5 /HPF Final    Bacteria, UA 2022 NONE SEEN  NONE SEEN /HPF Final    Hyaline Casts, UA 2022 NONE SEEN  NONE SEEN /LPF Final    Service Cmt: 2022    Final    Reflexive Urine Culture 2022    Final   Orders Only on 2022   Component Date Value Ref Range Status    PROSTATE SPECIFIC ANTIGEN, SCR - Q* 2022 0.36  < OR = 4.00 ng/mL Final    TESTOSTERONE, TOTAL, MALE 2022 681  250 - 827 ng/dL Final       Past Medical History:   Diagnosis Date    Allergy     BPH (benign prostatic hypertrophy)     Low serum testosterone level     Seminal vesiculitis     Testosterone deficiency     Viral hepatitis A without mention of hepatic coma      Social History     Socioeconomic History    Marital status:      Spouse name: Theresa Lucio    Number of children: 2   Occupational History    Occupation: [employer]     Comment: [employer]   Tobacco Use    Smoking status: Former     Packs/day: 0.25     Years: 0.25     Pack years: 0.06     Types: Cigarettes     Start date: 1974     Quit date: 1975     Years since quittin.0    Smokeless tobacco: Never   Substance and Sexual Activity    Alcohol use: Yes     Comment: 3x's week-beer or crown    Drug use: Never    Sexual activity: Yes     Partners: Female   Social History Narrative    Annamaria Daughter    Janelle Daughter     Past Surgical History:   Procedure Laterality Date    EYE SURGERY Bilateral     Lasik    KNEE ARTHROSCOPY Left 2021    partial MM     ROTATOR CUFF REPAIR  2013    left shoulder  per Dr. Jigar Greene    VASECTOMY       Family History   Problem Relation Age of Onset    Alzheimer's disease Father     Stroke Father     Dementia Mother     Emphysema Sister     COPD Sister         ex smoker    No Known Problems Brother   "   Eczema Neg Hx     Lupus Neg Hx     Psoriasis Neg Hx     Melanoma Neg Hx        Review of patient's allergies indicates:  No Known Allergies    Current Outpatient Medications:     acetaminophen-codeine 300-30mg (TYLENOL #3) 300-30 mg Tab, Take 1-2 tablets by mouth nightly as needed., Disp: , Rfl:     testosterone cypionate (DEPOTESTOTERONE CYPIONATE) 200 mg/mL injection, Inject 1 mL (200 mg total) into the muscle every 14 (fourteen) days., Disp: 5 mL, Rfl: 4    Review of Systems   Constitutional:  Negative for appetite change, chills, fatigue, fever and unexpected weight change.   HENT:  Negative for congestion, ear pain and trouble swallowing.    Eyes:  Negative for pain, discharge and visual disturbance.   Respiratory:  Negative for apnea, cough, shortness of breath and wheezing.         KATINA on CPAP   Cardiovascular:  Negative for chest pain and leg swelling.   Gastrointestinal:  Negative for abdominal pain, blood in stool, constipation, diarrhea, nausea and vomiting.   Endocrine: Negative for cold intolerance, heat intolerance and polydipsia.   Genitourinary:  Negative for dysuria, hematuria, testicular pain and urgency.        Nocturia 1-2 x night   Musculoskeletal:  Positive for arthralgias (left knee meniscus tear , sl sore). Negative for gait problem, joint swelling and myalgias.   Neurological:  Negative for dizziness, seizures and numbness.   Psychiatric/Behavioral:  Negative for agitation, behavioral problems, hallucinations and sleep disturbance. The patient is not nervous/anxious.         Objective:      Vitals:    12/27/22 0824   BP: 124/72   Pulse: 73   Weight: 97.5 kg (215 lb)   Height: 6' 1" (1.854 m)     Physical Exam  Vitals and nursing note reviewed.   Constitutional:       General: He is not in acute distress.     Appearance: Normal appearance. He is well-developed. He is not toxic-appearing.   HENT:      Head: Normocephalic and atraumatic.      Right Ear: Tympanic membrane and external ear " normal.      Left Ear: Tympanic membrane and external ear normal.      Nose: Nose normal.      Mouth/Throat:      Pharynx: Oropharynx is clear.   Eyes:      Pupils: Pupils are equal, round, and reactive to light.   Neck:      Thyroid: No thyromegaly.      Vascular: No carotid bruit.   Cardiovascular:      Rate and Rhythm: Normal rate and regular rhythm.      Heart sounds: Normal heart sounds. No murmur heard.  Pulmonary:      Effort: Pulmonary effort is normal.      Breath sounds: Normal breath sounds. No wheezing or rales.   Abdominal:      General: Bowel sounds are normal. There is no distension.      Palpations: Abdomen is soft.      Tenderness: There is no abdominal tenderness.   Musculoskeletal:         General: No tenderness or deformity. Normal range of motion.      Cervical back: Normal range of motion and neck supple.      Lumbar back: Normal. No spasms.      Comments: Bends 90 degrees at  waist shoulders and knees good range of motion, no pitting edema to lower extremities   Lymphadenopathy:      Cervical: No cervical adenopathy.   Skin:     General: Skin is warm and dry.      Findings: No rash.   Neurological:      Mental Status: He is alert and oriented to person, place, and time.      Cranial Nerves: No cranial nerve deficit.      Coordination: Coordination normal.   Psychiatric:         Mood and Affect: Mood normal.         Behavior: Behavior normal.         Thought Content: Thought content normal.         Judgment: Judgment normal.         Assessment:       1. Wellness examination    2. BPH with obstruction/lower urinary tract symptoms    3. Low serum testosterone level    4. KATINA on CPAP    5. Polycythemia         Plan:       Wellness examination  Lab work reviewed with patient.  Cholesterol excellent at 172 HDL 36    BPH with obstruction/lower urinary tract symptoms    Low serum testosterone level    KATINA on CPAP  Will order him a new CPAP machine at [phone]  Polycythemia  Orders  written for checking hematocrit Q 2-3 months, draw off 1 unit of blood if hematocrit greater than 50    Follow up in about 1 year (around 12/27/2023) for Annual Physical.        12/27/2022 Edwin Cottrell

## 2023-01-03 ENCOUNTER — TELEPHONE (OUTPATIENT)
Dept: FAMILY MEDICINE | Facility: CLINIC | Age: 63
End: 2023-01-03

## 2023-01-03 DIAGNOSIS — G47.33 OSA ON CPAP: Primary | ICD-10-CM

## 2023-01-04 NOTE — TELEPHONE ENCOUNTER
Spoke with pt in regards to Cpap order. Pt states that he needs a new cpap machine and the face mask.

## 2023-01-17 ENCOUNTER — TELEPHONE (OUTPATIENT)
Dept: FAMILY MEDICINE | Facility: CLINIC | Age: 63
End: 2023-01-17

## 2023-01-17 NOTE — TELEPHONE ENCOUNTER
----- Message from Heather Macias sent at 1/17/2023 11:09 AM CST -----  - pt is calling because he has not heard about his cpap machine   [phone]

## 2023-01-17 NOTE — TELEPHONE ENCOUNTER
Looks like you faxed this on 1/3- I can't find a fax number or number to even check on the status.

## 2023-01-17 NOTE — TELEPHONE ENCOUNTER
Called [facility], verified fax number [phone]. Re-faxed order to new fax number.    rest/sitting up

## 2023-01-17 NOTE — TELEPHONE ENCOUNTER
Spoke with pt in regards to message sent. Verbalized to the pt that I did call [facility] They did stated that they have not received anything for us about his Cpap orders. Told pt that I did re-fax orders over. Pt acknowledge understanding

## 2023-01-24 ENCOUNTER — TELEPHONE (OUTPATIENT)
Dept: FAMILY MEDICINE | Facility: CLINIC | Age: 63
End: 2023-01-24

## 2023-01-24 NOTE — TELEPHONE ENCOUNTER
Spoke with patient who states he already spoke with caroline who is suppose to be looking into this. States his last sleep study was done about 8 years ago through his ENT who has since retired. He is not sure where it was done. I did let him know if he was unable to get a copy we'd need to order a new sleep study. He will let us know

## 2023-01-24 NOTE — TELEPHONE ENCOUNTER
----- Message from Lori Arroyo sent at 1/24/2023 12:57 PM CST -----  Donna HERNANDEZ would like to get a copy of his sleep study so that they can get auth for the patient CPAP machine. Please send it to [phone] (He) [phone] Ext [phone]     No

## 2023-02-22 ENCOUNTER — TELEPHONE (OUTPATIENT)
Dept: FAMILY MEDICINE | Facility: CLINIC | Age: 63
End: 2023-02-22

## 2023-02-22 NOTE — TELEPHONE ENCOUNTER
----- Message from Yuko Schmidt sent at 2/22/2023  9:17 AM CST -----  Pt brought in paperwork for cataract surgery.  Gave to Stephy

## 2023-06-26 ENCOUNTER — TELEPHONE (OUTPATIENT)
Dept: FAMILY MEDICINE | Facility: CLINIC | Age: 63
End: 2023-06-26

## 2023-06-26 NOTE — TELEPHONE ENCOUNTER
----- Message from Antonietta Mays sent at 6/26/2023  8:01 AM CDT -----  Patient called and stated that he has rashes all over his chest and legs for the last few days and over the counter medication is not working he would like to come in today [phone]

## 2023-06-27 ENCOUNTER — OFFICE VISIT (OUTPATIENT)
Dept: FAMILY MEDICINE | Facility: CLINIC | Age: 63
End: 2023-06-27
Attending: FAMILY MEDICINE
Payer: COMMERCIAL

## 2023-06-27 VITALS
HEIGHT: 73 IN | WEIGHT: 216 LBS | HEART RATE: 73 BPM | BODY MASS INDEX: 28.63 KG/M2 | SYSTOLIC BLOOD PRESSURE: 120 MMHG | DIASTOLIC BLOOD PRESSURE: 70 MMHG

## 2023-06-27 DIAGNOSIS — L20.84 INTRINSIC ECZEMA: Primary | ICD-10-CM

## 2023-06-27 DIAGNOSIS — B35.6 TINEA CRURIS: ICD-10-CM

## 2023-06-27 PROCEDURE — 3074F PR MOST RECENT SYSTOLIC BLOOD PRESSURE < 130 MM HG: ICD-10-PCS | Mod: CPTII,S$GLB,, | Performed by: FAMILY MEDICINE

## 2023-06-27 PROCEDURE — 99213 OFFICE O/P EST LOW 20 MIN: CPT | Mod: S$GLB,,, | Performed by: FAMILY MEDICINE

## 2023-06-27 PROCEDURE — 3008F PR BODY MASS INDEX (BMI) DOCUMENTED: ICD-10-PCS | Mod: CPTII,S$GLB,, | Performed by: FAMILY MEDICINE

## 2023-06-27 PROCEDURE — 1159F PR MEDICATION LIST DOCUMENTED IN MEDICAL RECORD: ICD-10-PCS | Mod: CPTII,S$GLB,, | Performed by: FAMILY MEDICINE

## 2023-06-27 PROCEDURE — 3008F BODY MASS INDEX DOCD: CPT | Mod: CPTII,S$GLB,, | Performed by: FAMILY MEDICINE

## 2023-06-27 PROCEDURE — 1159F MED LIST DOCD IN RCRD: CPT | Mod: CPTII,S$GLB,, | Performed by: FAMILY MEDICINE

## 2023-06-27 PROCEDURE — 3078F PR MOST RECENT DIASTOLIC BLOOD PRESSURE < 80 MM HG: ICD-10-PCS | Mod: CPTII,S$GLB,, | Performed by: FAMILY MEDICINE

## 2023-06-27 PROCEDURE — 3078F DIAST BP <80 MM HG: CPT | Mod: CPTII,S$GLB,, | Performed by: FAMILY MEDICINE

## 2023-06-27 PROCEDURE — 99213 PR OFFICE/OUTPT VISIT, EST, LEVL III, 20-29 MIN: ICD-10-PCS | Mod: S$GLB,,, | Performed by: FAMILY MEDICINE

## 2023-06-27 PROCEDURE — 3074F SYST BP LT 130 MM HG: CPT | Mod: CPTII,S$GLB,, | Performed by: FAMILY MEDICINE

## 2023-06-27 RX ORDER — METHYLPREDNISOLONE 4 MG/1
TABLET ORAL
Qty: 1 EACH | Refills: 0 | Status: SHIPPED | OUTPATIENT
Start: 2023-06-27 | End: 2023-07-18

## 2023-06-27 RX ORDER — NYSTATIN 100000 U/G
CREAM TOPICAL 2 TIMES DAILY
Qty: 30 G | Refills: 3 | Status: SHIPPED | OUTPATIENT
Start: 2023-06-27

## 2023-06-27 RX ORDER — CLOBETASOL PROPIONATE 0.5 MG/G
CREAM TOPICAL 2 TIMES DAILY
Qty: 45 G | Refills: 2 | Status: SHIPPED | OUTPATIENT
Start: 2023-06-27

## 2023-06-28 NOTE — PROGRESS NOTES
SUBJECTIVE:    Patient ID: Edwin Conklin is a 62 y.o. male.    Chief Complaint: Rash (Rash all over the body for 3 weeks, no bottles, Itching,abc )    This 62-year-old male has had a rash x3 weeks.  Is worse in the heat.  He works as a  and  is in and out of hot environments all day.  The rash involves his trunk belt line both forearms and legs.  He has been working in the [employer] but has not been exposed to poison ivy.  Oral and topical Benadryl is not helping much.  He has changed to hypoallergenic soaps and detergents.  He has not had any recent fevers infections upper respiratory infections or viruses.    2022-left shoulder herpes zoster.    Currently on testosterone injections every 2 weeks for hypogonadism.      No visits with results within 6 Month(s) from this visit.   Latest known visit with results is:   Patient Message on 12/08/2022   Component Date Value Ref Range Status    WBC 12/16/2022 6.1  3.8 - 10.8 Thousand/uL Final    RBC 12/16/2022 5.57  4.20 - 5.80 Million/uL Final    Hemoglobin 12/16/2022 16.8  13.2 - 17.1 g/dL Final    Hematocrit 12/16/2022 49.8  38.5 - 50.0 % Final    MCV 12/16/2022 89.4  80.0 - 100.0 fL Final    MCH 12/16/2022 30.2  27.0 - 33.0 pg Final    MCHC 12/16/2022 33.7  32.0 - 36.0 g/dL Final    RDW 12/16/2022 13.8  11.0 - 15.0 % Final    Platelets 12/16/2022 182  140 - 400 Thousand/uL Final    MPV 12/16/2022 9.4  7.5 - 12.5 fL Final    Neutrophils, Abs 12/16/2022 3,794  1,500 - 7,800 cells/uL Final    Lymph # 12/16/2022 1,452  850 - 3,900 cells/uL Final    Mono # 12/16/2022 628  200 - 950 cells/uL Final    Eos # 12/16/2022 159  15 - 500 cells/uL Final    Baso # 12/16/2022 67  0 - 200 cells/uL Final    Neutrophils Relative 12/16/2022 62.2  % Final    Lymph % 12/16/2022 23.8  % Final    Mono % 12/16/2022 10.3  % Final    Eosinophil % 12/16/2022 2.6  % Final    Basophil % 12/16/2022 1.1  % Final    Glucose 12/16/2022 83  65 - 99 mg/dL Final    BUN 12/16/2022 11  7 - 25  mg/dL Final    Creatinine 12/16/2022 1.16  0.70 - 1.35 mg/dL Final    eGFR 12/16/2022 71  > OR = 60 mL/min/1.73m2 Final    BUN/Creatinine Ratio 12/16/2022 NOT APPLICABLE  6 - 22 (calc) Final    Sodium 12/16/2022 140  135 - 146 mmol/L Final    Potassium 12/16/2022 4.2  3.5 - 5.3 mmol/L Final    Chloride 12/16/2022 103  98 - 110 mmol/L Final    CO2 12/16/2022 31  20 - 32 mmol/L Final    Calcium 12/16/2022 8.9  8.6 - 10.3 mg/dL Final    Total Protein 12/16/2022 7.0  6.1 - 8.1 g/dL Final    Albumin 12/16/2022 4.0  3.6 - 5.1 g/dL Final    Globulin, Total 12/16/2022 3.0  1.9 - 3.7 g/dL (calc) Final    Albumin/Globulin Ratio 12/16/2022 1.3  1.0 - 2.5 (calc) Final    Total Bilirubin 12/16/2022 0.4  0.2 - 1.2 mg/dL Final    Alkaline Phosphatase 12/16/2022 43  35 - 144 U/L Final    AST 12/16/2022 15  10 - 35 U/L Final    ALT 12/16/2022 19  9 - 46 U/L Final    Cholesterol 12/16/2022 172  <200 mg/dL Final    HDL 12/16/2022 36 (L)  > OR = 40 mg/dL Final    Triglycerides 12/16/2022 146  <150 mg/dL Final    LDL Cholesterol 12/16/2022 110 (H)  mg/dL (calc) Final    HDL/Cholesterol Ratio 12/16/2022 4.8  <5.0 (calc) Final    Non HDL Chol. (LDL+VLDL) 12/16/2022 136 (H)  <130 mg/dL (calc) Final    TSH w/reflex to FT4 12/16/2022 2.59  0.40 - 4.50 mIU/L Final    Color, UA 12/16/2022 YELLOW  YELLOW Final    Appearance, UA 12/16/2022 CLEAR  CLEAR Final    Specific Gravity, UA 12/16/2022 1.020  1.001 - 1.035 Final    pH, UA 12/16/2022 5.5  5.0 - 8.0 Final    Glucose, UA 12/16/2022 NEGATIVE  NEGATIVE Final    Bilirubin, UA 12/16/2022 NEGATIVE  NEGATIVE Final    Ketones, UA 12/16/2022 NEGATIVE  NEGATIVE Final    Occult Blood UA 12/16/2022 NEGATIVE  NEGATIVE Final    Protein, UA 12/16/2022 NEGATIVE  NEGATIVE Final    Nitrite, UA 12/16/2022 NEGATIVE  NEGATIVE Final    Leukocytes, UA 12/16/2022 NEGATIVE  NEGATIVE Final    WBC Casts, UA 12/16/2022 NONE SEEN  < OR = 5 /HPF Final    RBC Casts, UA 12/16/2022 NONE SEEN  < OR = 2 /HPF Final    Squam  Epithel, UA 2022 NONE SEEN  < OR = 5 /HPF Final    Bacteria, UA 2022 NONE SEEN  NONE SEEN /HPF Final    Hyaline Casts, UA 2022 NONE SEEN  NONE SEEN /LPF Final    Service Cmt: 2022    Final    Reflexive Urine Culture 2022    Final       Past Medical History:   Diagnosis Date    Allergy     BPH (benign prostatic hypertrophy)     Low serum testosterone level     Seminal vesiculitis     Testosterone deficiency     Viral hepatitis A without mention of hepatic coma      Social History     Socioeconomic History    Marital status:      Spouse name: Theresa Lucio    Number of children: 2   Occupational History    Occupation: [employer]     Comment: [employer]   Tobacco Use    Smoking status: Former     Packs/day: 0.25     Years: 0.25     Pack years: 0.06     Types: Cigarettes     Start date: 1974     Quit date: 1975     Years since quittin.5    Smokeless tobacco: Never   Substance and Sexual Activity    Alcohol use: Yes     Comment: 3x's week-beer or crown    Drug use: Never    Sexual activity: Yes     Partners: Female   Social History Narrative    Annamaria Daughter    Janelle Daughter     Past Surgical History:   Procedure Laterality Date    CATARACT EXTRACTION W/  INTRAOCULAR LENS IMPLANT Right 2023    EYE SURGERY Bilateral     Lasik    KNEE ARTHROSCOPY Left 2021    partial MM     ROTATOR CUFF REPAIR  2013    left shoulder  per Dr. Jigar Greene    VASECTOMY       Family History   Problem Relation Age of Onset    Alzheimer's disease Father     Stroke Father     Dementia Mother     Emphysema Sister     COPD Sister         ex smoker    No Known Problems Brother     Eczema Neg Hx     Lupus Neg Hx     Psoriasis Neg Hx     Melanoma Neg Hx        Review of patient's allergies indicates:  No Known Allergies    Current Outpatient Medications:     acetaminophen-codeine 300-30mg (TYLENOL #3) 300-30 mg Tab, Take 1-2 tablets by mouth nightly as needed., Disp: ,  "Rfl:     diclofenac (VOLTAREN) 75 MG EC tablet, Take 1 tablet (75 mg total) by mouth 2 (two) times daily with meals., Disp: 60 tablet, Rfl: 3    testosterone cypionate (DEPOTESTOTERONE CYPIONATE) 200 mg/mL injection, ADMINISTER 1 ML(200 MG) IN THE MUSCLE EVERY 14 DAYS, Disp: 5 mL, Rfl: 1    clobetasoL (TEMOVATE) 0.05 % cream, Apply topically 2 (two) times daily., Disp: 45 g, Rfl: 2    methylPREDNISolone (MEDROL DOSEPACK) 4 mg tablet, use as directed, Disp: 1 each, Rfl: 0    nystatin (MYCOSTATIN) cream, Apply topically 2 (two) times daily. For  groin rash, Disp: 30 g, Rfl: 3    Review of Systems   Integumentary:  Positive for rash.         Objective:      Vitals:    06/27/23 1450   BP: 120/70   Pulse: 73   Weight: 98 kg (216 lb)   Height: 6' 1" (1.854 m)     Physical Exam  Vitals and nursing note reviewed.   Constitutional:       General: He is not in acute distress.     Appearance: Normal appearance. He is well-developed. He is not toxic-appearing.   HENT:      Head: Normocephalic and atraumatic.      Right Ear: Tympanic membrane and external ear normal.      Left Ear: Tympanic membrane and external ear normal.      Nose: Nose normal.      Mouth/Throat:      Pharynx: Oropharynx is clear.   Eyes:      Pupils: Pupils are equal, round, and reactive to light.   Neck:      Thyroid: No thyromegaly.      Vascular: No carotid bruit.   Cardiovascular:      Rate and Rhythm: Normal rate and regular rhythm.      Heart sounds: Normal heart sounds. No murmur heard.  Pulmonary:      Effort: Pulmonary effort is normal.      Breath sounds: Normal breath sounds. No wheezing or rales.   Abdominal:      General: Bowel sounds are normal. There is no distension.      Palpations: Abdomen is soft.      Tenderness: There is no abdominal tenderness.   Genitourinary:     Comments: Tinea cruris noted in the groin  Musculoskeletal:         General: No tenderness or deformity. Normal range of motion.      Cervical back: Normal range of motion " and neck supple.      Lumbar back: Normal. No spasms.      Comments: Bends 90 degrees at  waist   Lymphadenopathy:      Cervical: No cervical adenopathy.   Skin:     General: Skin is warm and dry.      Findings: Rash (patient has antecubital fossa eczema maculopapular rash.  Also has maculopapular rash to the trunk and belt line and thighs.) present.   Neurological:      Mental Status: He is alert and oriented to person, place, and time.      Cranial Nerves: No cranial nerve deficit.      Coordination: Coordination normal.   Psychiatric:         Behavior: Behavior normal.         Thought Content: Thought content normal.         Judgment: Judgment normal.         Assessment:       1. Intrinsic eczema    2. Tinea cruris         Plan:       Intrinsic eczema  -     methylPREDNISolone (MEDROL DOSEPACK) 4 mg tablet; use as directed  Dispense: 1 each; Refill: 0  -     clobetasoL (TEMOVATE) 0.05 % cream; Apply topically 2 (two) times daily.  Dispense: 45 g; Refill: 2  Will treat eczema with Medrol Dosepak and clobetasol cream.  Notify us in 2 weeks if no improvement  Tinea cruris  -     nystatin (MYCOSTATIN) cream; Apply topically 2 (two) times daily. For  groin rash  Dispense: 30 g; Refill: 3  Trial of nystatin to the groin    Follow up if symptoms worsen or fail to improve.        6/27/2023 Edwin Cottrell

## 2023-09-14 DIAGNOSIS — R79.89 LOW SERUM TESTOSTERONE LEVEL: ICD-10-CM

## 2023-09-15 RX ORDER — TESTOSTERONE CYPIONATE 200 MG/ML
INJECTION, SOLUTION INTRAMUSCULAR
Qty: 5 ML | Refills: 1 | Status: SHIPPED | OUTPATIENT
Start: 2023-09-15 | End: 2024-01-10 | Stop reason: SDUPTHER

## 2023-09-28 ENCOUNTER — TELEPHONE (OUTPATIENT)
Dept: FAMILY MEDICINE | Facility: CLINIC | Age: 63
End: 2023-09-28

## 2023-09-28 NOTE — TELEPHONE ENCOUNTER
Spoke with the blood center, they state they had an old order that is about to  in December and he has been donating blood. They would like an updated order. He is overdue for labs - she is sending us the forms.   
[id number]  used

## 2023-10-11 ENCOUNTER — PATIENT MESSAGE (OUTPATIENT)
Dept: FAMILY MEDICINE | Facility: CLINIC | Age: 63
End: 2023-10-11

## 2023-11-08 ENCOUNTER — TELEPHONE (OUTPATIENT)
Dept: FAMILY MEDICINE | Facility: CLINIC | Age: 63
End: 2023-11-08

## 2023-11-08 DIAGNOSIS — Z79.899 ENCOUNTER FOR LONG-TERM (CURRENT) USE OF OTHER MEDICATIONS: ICD-10-CM

## 2023-11-08 DIAGNOSIS — N13.8 BPH WITH OBSTRUCTION/LOWER URINARY TRACT SYMPTOMS: ICD-10-CM

## 2023-11-08 DIAGNOSIS — D75.1 POLYCYTHEMIA: ICD-10-CM

## 2023-11-08 DIAGNOSIS — N40.1 BPH WITH OBSTRUCTION/LOWER URINARY TRACT SYMPTOMS: ICD-10-CM

## 2023-11-08 DIAGNOSIS — Z00.00 WELLNESS EXAMINATION: Primary | ICD-10-CM

## 2023-11-08 NOTE — TELEPHONE ENCOUNTER
----- Message from Heather Macias sent at 11/8/2023 11:28 AM CST -----  Pt has an appt on November 29 and would like to know if he needs [facility]   [phone]

## 2023-11-08 NOTE — TELEPHONE ENCOUNTER
Spoke with patient and let him know the labs are in to be done any time as long as he is fasting.

## 2023-11-16 LAB
ALBUMIN SERPL-MCNC: 4.1 G/DL (ref 3.6–5.1)
ALBUMIN/GLOB SERPL: 1.2 (CALC) (ref 1–2.5)
ALP SERPL-CCNC: 47 U/L (ref 35–144)
ALT SERPL-CCNC: 30 U/L (ref 9–46)
APPEARANCE UR: CLEAR
AST SERPL-CCNC: 18 U/L (ref 10–35)
BACTERIA #/AREA URNS HPF: NORMAL /HPF
BACTERIA UR CULT: NORMAL
BASOPHILS # BLD AUTO: 102 CELLS/UL (ref 0–200)
BASOPHILS NFR BLD AUTO: 1.5 %
BILIRUB SERPL-MCNC: 0.4 MG/DL (ref 0.2–1.2)
BILIRUB UR QL STRIP: NEGATIVE
BUN SERPL-MCNC: 11 MG/DL (ref 7–25)
BUN/CREAT SERPL: NORMAL (CALC) (ref 6–22)
CALCIUM SERPL-MCNC: 9.5 MG/DL (ref 8.6–10.3)
CHLORIDE SERPL-SCNC: 103 MMOL/L (ref 98–110)
CHOLEST SERPL-MCNC: 158 MG/DL
CHOLEST/HDLC SERPL: 4.4 (CALC)
CO2 SERPL-SCNC: 31 MMOL/L (ref 20–32)
COLOR UR: YELLOW
CREAT SERPL-MCNC: 1.13 MG/DL (ref 0.7–1.35)
EGFR: 73 ML/MIN/1.73M2
EOSINOPHIL # BLD AUTO: 238 CELLS/UL (ref 15–500)
EOSINOPHIL NFR BLD AUTO: 3.5 %
ERYTHROCYTE [DISTWIDTH] IN BLOOD BY AUTOMATED COUNT: 15.3 % (ref 11–15)
GLOBULIN SER CALC-MCNC: 3.4 G/DL (CALC) (ref 1.9–3.7)
GLUCOSE SERPL-MCNC: 92 MG/DL (ref 65–99)
GLUCOSE UR QL STRIP: NEGATIVE
HCT VFR BLD AUTO: 51.9 % (ref 38.5–50)
HDLC SERPL-MCNC: 36 MG/DL
HGB BLD-MCNC: 16.6 G/DL (ref 13.2–17.1)
HGB UR QL STRIP: NEGATIVE
HYALINE CASTS #/AREA URNS LPF: NORMAL /LPF
KETONES UR QL STRIP: NEGATIVE
LDLC SERPL CALC-MCNC: 94 MG/DL (CALC)
LEUKOCYTE ESTERASE UR QL STRIP: NEGATIVE
LYMPHOCYTES # BLD AUTO: 1680 CELLS/UL (ref 850–3900)
LYMPHOCYTES NFR BLD AUTO: 24.7 %
MCH RBC QN AUTO: 27.5 PG (ref 27–33)
MCHC RBC AUTO-ENTMCNC: 32 G/DL (ref 32–36)
MCV RBC AUTO: 86.1 FL (ref 80–100)
MONOCYTES # BLD AUTO: 823 CELLS/UL (ref 200–950)
MONOCYTES NFR BLD AUTO: 12.1 %
NEUTROPHILS # BLD AUTO: 3958 CELLS/UL (ref 1500–7800)
NEUTROPHILS NFR BLD AUTO: 58.2 %
NITRITE UR QL STRIP: NEGATIVE
NONHDLC SERPL-MCNC: 122 MG/DL (CALC)
PH UR STRIP: 5.5 [PH] (ref 5–8)
PLATELET # BLD AUTO: 238 THOUSAND/UL (ref 140–400)
PMV BLD REES-ECKER: 9.6 FL (ref 7.5–12.5)
POTASSIUM SERPL-SCNC: 4.2 MMOL/L (ref 3.5–5.3)
PROT SERPL-MCNC: 7.5 G/DL (ref 6.1–8.1)
PROT UR QL STRIP: NEGATIVE
RBC # BLD AUTO: 6.03 MILLION/UL (ref 4.2–5.8)
RBC #/AREA URNS HPF: NORMAL /HPF
SERVICE CMNT-IMP: ABNORMAL
SERVICE CMNT-IMP: NORMAL
SODIUM SERPL-SCNC: 138 MMOL/L (ref 135–146)
SP GR UR STRIP: 1.01 (ref 1–1.03)
SQUAMOUS #/AREA URNS HPF: NORMAL /HPF
TRIGL SERPL-MCNC: 182 MG/DL
TSH SERPL-ACNC: 2.57 MIU/L (ref 0.4–4.5)
WBC # BLD AUTO: 6.8 THOUSAND/UL (ref 3.8–10.8)
WBC #/AREA URNS HPF: NORMAL /HPF

## 2023-11-29 ENCOUNTER — OFFICE VISIT (OUTPATIENT)
Dept: FAMILY MEDICINE | Facility: CLINIC | Age: 63
End: 2023-11-29
Attending: FAMILY MEDICINE
Payer: COMMERCIAL

## 2023-11-29 VITALS
SYSTOLIC BLOOD PRESSURE: 124 MMHG | WEIGHT: 225 LBS | HEART RATE: 80 BPM | HEIGHT: 73 IN | DIASTOLIC BLOOD PRESSURE: 70 MMHG | BODY MASS INDEX: 29.82 KG/M2

## 2023-11-29 DIAGNOSIS — N40.1 BPH WITH OBSTRUCTION/LOWER URINARY TRACT SYMPTOMS: ICD-10-CM

## 2023-11-29 DIAGNOSIS — R79.89 LOW SERUM TESTOSTERONE LEVEL: ICD-10-CM

## 2023-11-29 DIAGNOSIS — N13.8 BPH WITH OBSTRUCTION/LOWER URINARY TRACT SYMPTOMS: ICD-10-CM

## 2023-11-29 DIAGNOSIS — L57.0 ACTINIC KERATOSES: ICD-10-CM

## 2023-11-29 DIAGNOSIS — R05.9 COUGH, UNSPECIFIED TYPE: ICD-10-CM

## 2023-11-29 DIAGNOSIS — D75.1 POLYCYTHEMIA: Primary | ICD-10-CM

## 2023-11-29 DIAGNOSIS — G47.33 OSA ON CPAP: ICD-10-CM

## 2023-11-29 PROCEDURE — 3074F SYST BP LT 130 MM HG: CPT | Mod: CPTII,S$GLB,, | Performed by: FAMILY MEDICINE

## 2023-11-29 PROCEDURE — 99396 PREV VISIT EST AGE 40-64: CPT | Mod: S$GLB,,, | Performed by: FAMILY MEDICINE

## 2023-11-29 PROCEDURE — 3008F BODY MASS INDEX DOCD: CPT | Mod: CPTII,S$GLB,, | Performed by: FAMILY MEDICINE

## 2023-11-29 PROCEDURE — 3078F PR MOST RECENT DIASTOLIC BLOOD PRESSURE < 80 MM HG: ICD-10-PCS | Mod: CPTII,S$GLB,, | Performed by: FAMILY MEDICINE

## 2023-11-29 PROCEDURE — 3074F PR MOST RECENT SYSTOLIC BLOOD PRESSURE < 130 MM HG: ICD-10-PCS | Mod: CPTII,S$GLB,, | Performed by: FAMILY MEDICINE

## 2023-11-29 PROCEDURE — 99396 PR PREVENTIVE VISIT,EST,40-64: ICD-10-PCS | Mod: S$GLB,,, | Performed by: FAMILY MEDICINE

## 2023-11-29 PROCEDURE — 1159F PR MEDICATION LIST DOCUMENTED IN MEDICAL RECORD: ICD-10-PCS | Mod: CPTII,S$GLB,, | Performed by: FAMILY MEDICINE

## 2023-11-29 PROCEDURE — 3078F DIAST BP <80 MM HG: CPT | Mod: CPTII,S$GLB,, | Performed by: FAMILY MEDICINE

## 2023-11-29 PROCEDURE — 3008F PR BODY MASS INDEX (BMI) DOCUMENTED: ICD-10-PCS | Mod: CPTII,S$GLB,, | Performed by: FAMILY MEDICINE

## 2023-11-29 PROCEDURE — 1159F MED LIST DOCD IN RCRD: CPT | Mod: CPTII,S$GLB,, | Performed by: FAMILY MEDICINE

## 2023-11-29 RX ORDER — BENZONATATE 100 MG/1
100 CAPSULE ORAL 3 TIMES DAILY PRN
Qty: 30 CAPSULE | Refills: 1 | Status: SHIPPED | OUTPATIENT
Start: 2023-11-29 | End: 2023-12-09

## 2023-11-29 NOTE — PROGRESS NOTES
SUBJECTIVE:    Patient ID: Edwin Conklin is a 63 y.o. male.    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  For exercise he does bowling and yd work.  He has gained 9 lb since last visit.    Positive for COVID on November 4, 2023, he had a sore throat no fever shortness of breath, a dry cough persists.    Urology Dr. Fran Ruiz-currently on testosterone replacement injections every 2 weeks.  He does have polycythemia with a hematocrit of 51 and he donates blood every 3 months.    He complains of back aches occasionally.    December 2021-colonoscopy with Dr. Roblero-RTC 10 years    Nocturia 1-2 times per night, BPH    Dermatology Dr. Jacob appointment in January        Telephone on 11/08/2023   Component Date Value Ref Range Status    WBC 11/15/2023 6.8  3.8 - 10.8 Thousand/uL Final    RBC 11/15/2023 6.03 (H)  4.20 - 5.80 Million/uL Final    Hemoglobin 11/15/2023 16.6  13.2 - 17.1 g/dL Final    Hematocrit 11/15/2023 51.9 (H)  38.5 - 50.0 % Final    MCV 11/15/2023 86.1  80.0 - 100.0 fL Final    MCH 11/15/2023 27.5  27.0 - 33.0 pg Final    MCHC 11/15/2023 32.0  32.0 - 36.0 g/dL Final    RDW 11/15/2023 15.3 (H)  11.0 - 15.0 % Final    Platelets 11/15/2023 238  140 - 400 Thousand/uL Final    MPV 11/15/2023 9.6  7.5 - 12.5 fL Final    Neutrophils, Abs 11/15/2023 3,958  1,500 - 7,800 cells/uL Final    Lymph # 11/15/2023 1,680  850 - 3,900 cells/uL Final    Mono # 11/15/2023 823  200 - 950 cells/uL Final    Eos # 11/15/2023 238  15 - 500 cells/uL Final    Baso # 11/15/2023 102  0 - 200 cells/uL Final    Neutrophils Relative 11/15/2023 58.2  % Final    Lymph % 11/15/2023 24.7  % Final    Mono % 11/15/2023 12.1  % Final    Eosinophil % 11/15/2023 3.5  % Final    Basophil % 11/15/2023 1.5  % Final    Differential Comment 11/15/2023 Review of peripheral smear confirms  automated results.   Final    Glucose 11/15/2023 92  65 - 99 mg/dL Final    BUN 11/15/2023 11  7 - 25 mg/dL Final    Creatinine 11/15/2023 1.13  0.70 - 1.35 mg/dL Final    eGFR 11/15/2023 73  > OR = 60 mL/min/1.73m2 Final    BUN/Creatinine Ratio 11/15/2023 SEE NOTE:  6 - 22 (calc) Final    Sodium 11/15/2023 138  135 - 146 mmol/L Final    Potassium 11/15/2023 4.2  3.5 - 5.3 mmol/L Final    Chloride 11/15/2023 103  98 - 110 mmol/L Final    CO2 11/15/2023 31  20 - 32 mmol/L Final    Calcium 11/15/2023 9.5  8.6 - 10.3 mg/dL Final    Total Protein 11/15/2023 7.5  6.1 - 8.1 g/dL Final    Albumin 11/15/2023 4.1  3.6 - 5.1 g/dL Final    Globulin, Total 11/15/2023 3.4  1.9 - 3.7 g/dL (calc) Final    Albumin/Globulin Ratio 11/15/2023 1.2  1.0 - 2.5 (calc) Final    Total Bilirubin 11/15/2023 0.4  0.2 - 1.2 mg/dL Final    Alkaline Phosphatase 11/15/2023 47  35 - 144 U/L Final    AST 11/15/2023 18  10 - 35 U/L Final    ALT 11/15/2023 30  9 - 46 U/L Final    Cholesterol 11/15/2023 158  <200 mg/dL Final    HDL 11/15/2023 36 (L)  > OR = 40 mg/dL Final    Triglycerides 11/15/2023 182 (H)  <150 mg/dL Final    LDL Cholesterol 11/15/2023 94  mg/dL (calc) Final    HDL/Cholesterol Ratio 11/15/2023 4.4  <5.0 (calc) Final    Non HDL Chol. (LDL+VLDL) 11/15/2023 122  <130 mg/dL (calc) Final    TSH w/reflex to FT4 11/15/2023 2.57  0.40 - 4.50 mIU/L Final    Color, UA 11/15/2023 YELLOW  YELLOW Final    Appearance, UA 11/15/2023 CLEAR  CLEAR Final    Specific Gravity, UA 11/15/2023 1.013  1.001 - 1.035 Final    pH, UA 11/15/2023 5.5  5.0 - 8.0 Final    Glucose, UA 11/15/2023 NEGATIVE  NEGATIVE Final    Bilirubin, UA 11/15/2023 NEGATIVE  NEGATIVE Final    Ketones, UA 11/15/2023 NEGATIVE  NEGATIVE Final    Occult Blood UA 11/15/2023 NEGATIVE  NEGATIVE Final    Protein, UA 11/15/2023 NEGATIVE  NEGATIVE Final    Nitrite, UA 11/15/2023 NEGATIVE  NEGATIVE Final    Leukocytes, UA 11/15/2023 NEGATIVE  NEGATIVE Final    WBC Casts, UA 11/15/2023 NONE  SEEN  < OR = 5 /HPF Final    RBC Casts, UA 11/15/2023 NONE SEEN  < OR = 2 /HPF Final    Squam Epithel, UA 11/15/2023 NONE SEEN  < OR = 5 /HPF Final    Bacteria, UA 11/15/2023 NONE SEEN  NONE SEEN /HPF Final    Hyaline Casts, UA 11/15/2023 NONE SEEN  NONE SEEN /LPF Final    Service Cmt: 11/15/2023    Final    Reflexive Urine Culture 11/15/2023    Final       Past Medical History:   Diagnosis Date    Allergy     BPH (benign prostatic hypertrophy)     Low serum testosterone level     Seminal vesiculitis     Testosterone deficiency     Viral hepatitis A without mention of hepatic coma      Social History     Socioeconomic History    Marital status:      Spouse name: Theresa Lucio    Number of children: 2   Occupational History    Occupation: Salesman     Comment: [employer]   Tobacco Use    Smoking status: Former     Current packs/day: 0.00     Average packs/day: 0.3 packs/day for 1 year (0.3 ttl pk-yrs)     Types: Cigarettes     Start date: 1974     Quit date: 1975     Years since quittin.9    Smokeless tobacco: Never   Substance and Sexual Activity    Alcohol use: Yes     Comment: 3x's week-beer or crown    Drug use: Never    Sexual activity: Yes     Partners: Female   Social History Narrative    Annamaria Daughter    Janelle Daughter     Social Determinants of Health     Financial Resource Strain: Low Risk  (2020)    Overall Financial Resource Strain (CARDIA)     Difficulty of Paying Living Expenses: Not hard at all   Food Insecurity: No Food Insecurity (2020)    Hunger Vital Sign     Worried About Running Out of Food in the Last Year: Never true     Ran Out of Food in the Last Year: Never true   Transportation Needs: No Transportation Needs (2020)    PRAPARE - Transportation     Lack of Transportation (Medical): No     Lack of Transportation (Non-Medical): No   Physical Activity: Insufficiently Active (2020)    Exercise Vital Sign     Days of Exercise per Week: 3  days     Minutes of Exercise per Session: 40 min   Stress: No Stress Concern Present (8/7/2019)    [place] [employer] - Occupational Stress Questionnaire     Feeling of Stress : Not at all   Social Connections: Unknown (7/21/2020)    Social Connection and Isolation Panel [NHANES]     Frequency of Communication with Friends and Family: Three times a week     Frequency of Social Gatherings with Friends and Family: Once a week     Active Member of Clubs or Organizations: Yes     Attends Club or Organization Meetings: 1 to 4 times per year     Marital Status:      Past Surgical History:   Procedure Laterality Date    CATARACT EXTRACTION W/  INTRAOCULAR LENS IMPLANT Right 03/09/2023    EYE SURGERY Bilateral     Lasik    KNEE ARTHROSCOPY Left 05/27/2021    partial MM     ROTATOR CUFF REPAIR  02/2013    left shoulder  per Dr. Jigar Greene    VASECTOMY       Family History   Problem Relation Age of Onset    Alzheimer's disease Father     Stroke Father     Dementia Mother     Emphysema Sister     COPD Sister         ex smoker    No Known Problems Brother     Eczema Neg Hx     Lupus Neg Hx     Psoriasis Neg Hx     Melanoma Neg Hx        The 10-year CVD risk score (Sonny, et al., 2008) is: 16.3%    Values used to calculate the score:      Age: 63 years      Sex: Male      Diabetic: No      Tobacco smoker: No      Systolic Blood Pressure: 124 mmHg      Is BP treated: No      HDL Cholesterol: 36 mg/dL      Total Cholesterol: 158 mg/dL    All of your core healthy metrics are met.      Review of patient's allergies indicates:  No Known Allergies    Current Outpatient Medications:     acetaminophen-codeine 300-30mg (TYLENOL #3) 300-30 mg Tab, Take 1-2 tablets by mouth nightly as needed., Disp: , Rfl:     clobetasoL (TEMOVATE) 0.05 % cream, Apply topically 2 (two) times daily., Disp: 45 g, Rfl: 2    diclofenac (VOLTAREN) 75 MG EC tablet, Take 1 tablet (75 mg total) by mouth 2 (two) times daily with  "meals., Disp: 60 tablet, Rfl: 3    nystatin (MYCOSTATIN) cream, Apply topically 2 (two) times daily. For  groin rash, Disp: 30 g, Rfl: 3    testosterone cypionate (DEPOTESTOTERONE CYPIONATE) 200 mg/mL injection, ADMINISTER 1 ML(200 MG) IN THE MUSCLE EVERY 14 DAYS, Disp: 5 mL, Rfl: 1    benzonatate (TESSALON) 100 MG capsule, Take 1 capsule (100 mg total) by mouth 3 (three) times daily as needed for Cough., Disp: 30 capsule, Rfl: 1    Review of Systems   Constitutional:  Negative for appetite change, chills, fatigue, fever and unexpected weight change.   HENT:  Negative for ear pain and trouble swallowing.    Eyes:  Negative for pain, discharge and visual disturbance.   Respiratory:  Negative for apnea, cough, shortness of breath and wheezing.    Cardiovascular:  Negative for chest pain and leg swelling.   Gastrointestinal:  Negative for abdominal pain, blood in stool, constipation, diarrhea, nausea, vomiting and reflux.   Endocrine: Negative for cold intolerance, heat intolerance and polydipsia.   Genitourinary:  Negative for bladder incontinence, dysuria, erectile dysfunction, frequency, hematuria, testicular pain and urgency.   Musculoskeletal:  Positive for back pain. Negative for gait problem, joint swelling and myalgias.   Neurological:  Negative for dizziness, seizures and numbness.   Psychiatric/Behavioral:  Negative for agitation, behavioral problems and hallucinations. The patient is not nervous/anxious.            Objective:      Vitals:    11/29/23 0825   BP: 124/70   Pulse: 80   Weight: 102.1 kg (225 lb)   Height: 6' 1" (1.854 m)     Physical Exam  Vitals and nursing note reviewed.   Constitutional:       General: He is not in acute distress.     Appearance: Normal appearance. He is well-developed. He is not toxic-appearing.   HENT:      Head: Normocephalic and atraumatic.      Right Ear: Tympanic membrane and external ear normal.      Left Ear: Tympanic membrane and external ear normal.      Nose: Nose " normal.      Mouth/Throat:      Pharynx: Oropharynx is clear.   Eyes:      Pupils: Pupils are equal, round, and reactive to light.   Neck:      Thyroid: No thyromegaly.      Vascular: No carotid bruit.   Cardiovascular:      Rate and Rhythm: Normal rate and regular rhythm.      Heart sounds: Normal heart sounds. No murmur heard.  Pulmonary:      Effort: Pulmonary effort is normal.      Breath sounds: Normal breath sounds. No wheezing or rales.   Abdominal:      General: Bowel sounds are normal. There is no distension.      Palpations: Abdomen is soft.      Tenderness: There is no abdominal tenderness.   Musculoskeletal:         General: No tenderness or deformity. Normal range of motion.      Cervical back: Normal range of motion and neck supple.      Lumbar back: Normal. No spasms.      Comments: Bends 90 degrees at  waist, shoulders and knees full range of motion without crepitance.  No pitting edema to lower extremities   Lymphadenopathy:      Cervical: No cervical adenopathy.   Skin:     General: Skin is warm and dry.      Findings: No rash.   Neurological:      Mental Status: He is alert and oriented to person, place, and time. Mental status is at baseline.      Cranial Nerves: No cranial nerve deficit.      Coordination: Coordination normal.      Gait: Gait normal.   Psychiatric:         Mood and Affect: Mood normal.         Behavior: Behavior normal.         Thought Content: Thought content normal.         Judgment: Judgment normal.           Assessment:       1. Polycythemia    2. Cough, unspecified type    3. Actinic keratoses    4. BPH with obstruction/lower urinary tract symptoms    5. Low serum testosterone level    6. KATINA on CPAP         Plan:       Polycythemia  Hematocrit up to 51, needs a phlebotomy every 3 months if hematocrit greater than 50.  Cough, unspecified type  -     benzonatate (TESSALON) 100 MG capsule; Take 1 capsule (100 mg total) by mouth 3 (three) times daily as needed for Cough.   Dispense: 30 capsule; Refill: 1  Will treat dry cough with benzonatate  Actinic keratoses  Dr. Jacob visit in January  BPH with obstruction/lower urinary tract symptoms  Dr. Fran Ruiz  does PSA yearly  Low serum testosterone level  Testosterone injections q.2 weeks  KATINA on CPAP  Cholesterol 158 HDL 36  LDL 94  Recommend weightlifting and yoga to help his back pain.  Follow up in about 6 months (around 5/29/2024), or Polycythemia.        12/3/2023 Edwin Cottrell

## 2023-12-01 ENCOUNTER — TELEPHONE (OUTPATIENT)
Dept: OTOLARYNGOLOGY | Facility: CLINIC | Age: 63
End: 2023-12-01
Payer: COMMERCIAL

## 2023-12-01 NOTE — TELEPHONE ENCOUNTER
Returned call. Apt scheduled for December 5th at 10:40AM with Dr. Chambers. Pt thanked me and verbalized understanding.       ----- Message from Heather Conklin sent at 12/1/2023 11:16 AM CST -----  Needs advice from nurse:      Who Called:pt  Regarding:needs to be seen for sore throat/sinus issues  Would the patient rather a call back or VIA [facility]?  Best Call Back number: [phone]  Additional Info:

## 2023-12-05 ENCOUNTER — OFFICE VISIT (OUTPATIENT)
Dept: OTOLARYNGOLOGY | Facility: CLINIC | Age: 63
End: 2023-12-05
Payer: COMMERCIAL

## 2023-12-05 VITALS
WEIGHT: 222.69 LBS | DIASTOLIC BLOOD PRESSURE: 77 MMHG | BODY MASS INDEX: 29.38 KG/M2 | SYSTOLIC BLOOD PRESSURE: 117 MMHG | HEART RATE: 74 BPM

## 2023-12-05 DIAGNOSIS — R05.2 SUBACUTE COUGH: ICD-10-CM

## 2023-12-05 DIAGNOSIS — J01.00 ACUTE NON-RECURRENT MAXILLARY SINUSITIS: Primary | ICD-10-CM

## 2023-12-05 DIAGNOSIS — J31.0 CHRONIC RHINITIS: ICD-10-CM

## 2023-12-05 DIAGNOSIS — J02.9 PHARYNGITIS, UNSPECIFIED ETIOLOGY: ICD-10-CM

## 2023-12-05 PROCEDURE — 3078F DIAST BP <80 MM HG: CPT | Mod: CPTII,S$GLB,, | Performed by: OTOLARYNGOLOGY

## 2023-12-05 PROCEDURE — 1159F PR MEDICATION LIST DOCUMENTED IN MEDICAL RECORD: ICD-10-PCS | Mod: CPTII,S$GLB,, | Performed by: OTOLARYNGOLOGY

## 2023-12-05 PROCEDURE — 96372 THER/PROPH/DIAG INJ SC/IM: CPT | Mod: S$GLB,,, | Performed by: OTOLARYNGOLOGY

## 2023-12-05 PROCEDURE — 99999 PR PBB SHADOW E&M-EST. PATIENT-LVL III: CPT | Mod: PBBFAC,,, | Performed by: OTOLARYNGOLOGY

## 2023-12-05 PROCEDURE — 99999 PR PBB SHADOW E&M-EST. PATIENT-LVL III: ICD-10-PCS | Mod: PBBFAC,,, | Performed by: OTOLARYNGOLOGY

## 2023-12-05 PROCEDURE — 3078F PR MOST RECENT DIASTOLIC BLOOD PRESSURE < 80 MM HG: ICD-10-PCS | Mod: CPTII,S$GLB,, | Performed by: OTOLARYNGOLOGY

## 2023-12-05 PROCEDURE — 96372 PR INJECTION,THERAP/PROPH/DIAG2ST, IM OR SUBCUT: ICD-10-PCS | Mod: S$GLB,,, | Performed by: OTOLARYNGOLOGY

## 2023-12-05 PROCEDURE — 99203 PR OFFICE/OUTPT VISIT, NEW, LEVL III, 30-44 MIN: ICD-10-PCS | Mod: 25,S$GLB,, | Performed by: OTOLARYNGOLOGY

## 2023-12-05 PROCEDURE — 99203 OFFICE O/P NEW LOW 30 MIN: CPT | Mod: 25,S$GLB,, | Performed by: OTOLARYNGOLOGY

## 2023-12-05 PROCEDURE — 3008F PR BODY MASS INDEX (BMI) DOCUMENTED: ICD-10-PCS | Mod: CPTII,S$GLB,, | Performed by: OTOLARYNGOLOGY

## 2023-12-05 PROCEDURE — 1159F MED LIST DOCD IN RCRD: CPT | Mod: CPTII,S$GLB,, | Performed by: OTOLARYNGOLOGY

## 2023-12-05 PROCEDURE — 3074F SYST BP LT 130 MM HG: CPT | Mod: CPTII,S$GLB,, | Performed by: OTOLARYNGOLOGY

## 2023-12-05 PROCEDURE — 3008F BODY MASS INDEX DOCD: CPT | Mod: CPTII,S$GLB,, | Performed by: OTOLARYNGOLOGY

## 2023-12-05 PROCEDURE — 3074F PR MOST RECENT SYSTOLIC BLOOD PRESSURE < 130 MM HG: ICD-10-PCS | Mod: CPTII,S$GLB,, | Performed by: OTOLARYNGOLOGY

## 2023-12-05 RX ORDER — METHYLPREDNISOLONE ACETATE 40 MG/ML
40 INJECTION, SUSPENSION INTRA-ARTICULAR; INTRALESIONAL; INTRAMUSCULAR; SOFT TISSUE
Status: COMPLETED | OUTPATIENT
Start: 2023-12-05 | End: 2023-12-05

## 2023-12-05 RX ORDER — DOXYCYCLINE HYCLATE 100 MG
100 TABLET ORAL 2 TIMES DAILY
Qty: 28 EACH | Refills: 0 | Status: SHIPPED | OUTPATIENT
Start: 2023-12-05 | End: 2023-12-19

## 2023-12-05 RX ORDER — FLUTICASONE PROPIONATE 50 MCG
1 SPRAY, SUSPENSION (ML) NASAL 2 TIMES DAILY
Qty: 11.1 ML | Refills: 11 | Status: SHIPPED | OUTPATIENT
Start: 2023-12-05 | End: 2024-01-04

## 2023-12-05 RX ORDER — PROMETHAZINE HYDROCHLORIDE AND DEXTROMETHORPHAN HYDROBROMIDE 6.25; 15 MG/5ML; MG/5ML
5 SYRUP ORAL EVERY 4 HOURS PRN
Qty: 180 ML | Refills: 0 | Status: SHIPPED | OUTPATIENT
Start: 2023-12-05 | End: 2023-12-15

## 2023-12-05 RX ADMIN — METHYLPREDNISOLONE ACETATE 40 MG: 40 INJECTION, SUSPENSION INTRA-ARTICULAR; INTRALESIONAL; INTRAMUSCULAR; SOFT TISSUE at 12:12

## 2023-12-05 NOTE — PATIENT INSTRUCTIONS
Doxycycline and steroid shot for sinusitis post covid.    Flonase nasal spray daily.   Hydrate.   Warm liquids and honey for throat soothing.   Promethazine-DM cough medicine at night if needed.  May use mucinex DM or delsym during the day for cough.

## 2023-12-05 NOTE — PROGRESS NOTES
Chief Complaint   Patient presents with    Sinus Problem    Sore Throat    Cough        HPI:          The pt is 63 y.o. male with a sore throat. Symptoms began several weeks ago.  The patient was diagnosed with COVID in mid November and symptoms have lingered since that time.  The pain is moderate. The pain is primarily on (the)  both sides. Fever is absent. There is no  history of trismus. The voice is not muffled, and there is no ongoing issue with hoarseness.  Other associated symptoms have included: cough, which was not responsive to Tessalon Perles. Fluid intake is good.   The patient has not been prescribed any antibiotics or other treatments for the ongoing symptoms.    He does have a history of previous chronic rhinitis and was a patient of Gael Bermudez and Steve at my previous office.              Past Medical History:   Diagnosis Date    Allergy     BPH (benign prostatic hypertrophy)     Low serum testosterone level     Seminal vesiculitis     Testosterone deficiency     Viral hepatitis A without mention of hepatic coma      Social History     Socioeconomic History    Marital status:      Spouse name: Theresa Lucio    Number of children: 2   Occupational History    Occupation: Salesman     Comment: [employer]   Tobacco Use    Smoking status: Former     Current packs/day: 0.00     Average packs/day: 0.3 packs/day for 1 year (0.3 ttl pk-yrs)     Types: Cigarettes     Start date: 1974     Quit date: 1975     Years since quittin.9    Smokeless tobacco: Never   Substance and Sexual Activity    Alcohol use: Yes     Comment: 3x's week-beer or crown    Drug use: Never    Sexual activity: Yes     Partners: Female   Social History Narrative    Annamaria Daughter    Janelle Daughter     Social Determinants of Health     Financial Resource Strain: Low Risk  (2020)    Overall Financial Resource Strain (CARDIA)     Difficulty of Paying Living Expenses: Not hard at all   Food  Insecurity: No Food Insecurity (7/21/2020)    Hunger Vital Sign     Worried About Running Out of Food in the Last Year: Never true     Ran Out of Food in the Last Year: Never true   Transportation Needs: No Transportation Needs (7/21/2020)    PRAPARE - Transportation     Lack of Transportation (Medical): No     Lack of Transportation (Non-Medical): No   Physical Activity: Insufficiently Active (7/21/2020)    Exercise Vital Sign     Days of Exercise per Week: 3 days     Minutes of Exercise per Session: 40 min   Stress: No Stress Concern Present (8/7/2019)    [place] [employer] - Occupational Stress Questionnaire     Feeling of Stress : Not at all   Social Connections: Unknown (7/21/2020)    Social Connection and Isolation Panel [NHANES]     Frequency of Communication with Friends and Family: Three times a week     Frequency of Social Gatherings with Friends and Family: Once a week     Active Member of Clubs or Organizations: Yes     Attends Club or Organization Meetings: 1 to 4 times per year     Marital Status:      Past Surgical History:   Procedure Laterality Date    CATARACT EXTRACTION W/  INTRAOCULAR LENS IMPLANT Right 03/09/2023    EYE SURGERY Bilateral     Lasik    KNEE ARTHROSCOPY Left 05/27/2021    partial MM     ROTATOR CUFF REPAIR  02/2013    left shoulder  per Dr. Jigar Greene    VASECTOMY       Family History   Problem Relation Age of Onset    Alzheimer's disease Father     Stroke Father     Dementia Mother     Emphysema Sister     COPD Sister         ex smoker    No Known Problems Brother     Eczema Neg Hx     Lupus Neg Hx     Psoriasis Neg Hx     Melanoma Neg Hx            Review of Systems  General: negative for chills, fever or weight loss  Psychological: negative for mood changes or depression  Ophthalmic: negative for blurry vision, photophobia or eye pain  ENT: see HPI  Respiratory: no cough, shortness of breath, or wheezing  Cardiovascular: no chest pain or dyspnea on  exertion  Gastrointestinal: no abdominal pain, change in bowel habits, or black/ bloody stools  Musculoskeletal: negative for gait disturbance or muscular weakness  Neurological: no syncope or seizures; no ataxia  Dermatological: negative for pruritis,  rash and jaundice  Hematologic/lymphatic: no easy bruising, no new adenopathy      Physical Exam:    Vitals:    12/05/23 1039   BP: 117/77   Pulse: 74         Constitutional:   He is oriented to person, place, and time. Vital signs are normal. He appears well-developed and well-nourished. He appears alert. He is cooperative. Normal speech.      Head:  Normocephalic and atraumatic. Salivary glands normal.  Facial strength is normal.      Ears:  Hearing normal to normal and whispered voice; external ear normal without scars, lesions, or masses; ear canal, tympanic membrane, and middle ear normal., right ear hearing normal to normal and whispered voice; external ear normal without scars, lesions, or masses; ear canal, tympanic membrane, and middle ear normal. and left ear hearing normal to normal and whispered voice; external ear normal without scars, lesions, or masses; ear canal, tympanic membrane, and middle ear normal..   Right Ear: No middle ear effusion.   Left Ear:  No middle ear effusion.     Nose:  Mucosal edema (generalized erythema and edema of the nasal passages) and rhinorrhea (whitish) present. No septal deviation or polyps. Turbinate hypertrophy (3+, boggy, congested mucosa).  Turbinates normal.  Right sinus exhibits no maxillary sinus tenderness and no frontal sinus tenderness. Left sinus exhibits no maxillary sinus tenderness and no frontal sinus tenderness.     Mouth/Throat  Oropharynx clear and moist without lesions or asymmetry, normal uvula midline, lips, teeth, and gums normal and oropharynx normal. Uvula swelling (mild) present. No oral lesions, trismus or mucous membrane lesions. Posterior oropharyngeal edema and posterior oropharyngeal erythema  (erythema of the soft palate and uvula, no exudates) present. No oropharyngeal exudate. Tonsils present (no exudates or significant erythema), +1.  Mirror exam not performed due to patient tolerance.  Mirror exam not performed due to patient tolerance.      Neck:  Neck normal without thyromegaly masses, asymmetry, normal tracheal structure, crepitus, and tenderness, thyroid normal, trachea normal, phonation normal, full range of motion with neck supple and no adenopathy. No JVD present. Carotid bruit is not present. No thyroid mass and no thyromegaly present.     He has no cervical adenopathy.     Cardiovascular:    Normal rate, regular rhythm and rate and rhythm, heart sounds, and pulses normal.              Pulmonary/Chest:   Breath sounds normal and effort and breath sounds normal. He has no decreased breath sounds. He has no wheezes.     Psychiatric:   He has a normal mood and affect. His speech is normal and behavior is normal.     Neurological:   He is alert and oriented to person, place, and time. He has neurological normal, alert and oriented. No cranial nerve deficit.     Skin:   No abrasions, lacerations, lesions, or rashes.               Assessment:    ICD-10-CM ICD-9-CM    1. Acute non-recurrent maxillary sinusitis  J01.00 461.0 doxycycline (VIBRA-TABS) 100 MG tablet      methylPREDNISolone acetate injection 40 mg      fluticasone propionate (FLONASE) 50 mcg/actuation nasal spray      promethazine-dextromethorphan (PROMETHAZINE-DM) 6.25-15 mg/5 mL Syrp      2. Pharyngitis, unspecified etiology  J02.9 462       3. Subacute cough  R05.2 786.2       4. Chronic rhinitis  J31.0 472.0         The primary encounter diagnosis was Acute non-recurrent maxillary sinusitis. Diagnoses of Pharyngitis, unspecified etiology, Subacute cough, and Chronic rhinitis were also pertinent to this visit.      Plan:    Doxycycline times 14 days.    Depo-Medrol IM today.    May use OTC Mucinex DM or Delsym for cough during the  day.    Promethazine DM for nighttime use for cough.  Flonase nasal spray daily.  Hydration, warm liquids and honey for soothing of pharyngitis.    Patient will let me know if symptoms do not resolve with the above treatment and he will come for follow-up.      Mary Chambers MD

## 2023-12-14 ENCOUNTER — TELEPHONE (OUTPATIENT)
Dept: FAMILY MEDICINE | Facility: CLINIC | Age: 63
End: 2023-12-14

## 2023-12-14 NOTE — TELEPHONE ENCOUNTER
----- Message from She Cortez sent at 12/14/2023 12:29 PM CST -----  Contact: [facility]  The blood center needs the date for the orders for the phlebotomy draw. Reinaldo @[phone]

## 2024-01-02 ENCOUNTER — TELEPHONE (OUTPATIENT)
Dept: FAMILY MEDICINE | Facility: CLINIC | Age: 64
End: 2024-01-02
Payer: COMMERCIAL

## 2024-01-02 NOTE — TELEPHONE ENCOUNTER
When are 12/3 labs due? I was working overdue result folder and came across these, but isn't clear in the note. Maybe 3 months?

## 2024-01-03 ENCOUNTER — TELEPHONE (OUTPATIENT)
Dept: UROLOGY | Facility: CLINIC | Age: 64
End: 2024-01-03
Payer: COMMERCIAL

## 2024-01-03 DIAGNOSIS — Z12.5 SCREENING FOR PROSTATE CANCER: ICD-10-CM

## 2024-01-03 DIAGNOSIS — R79.89 LOW SERUM TESTOSTERONE LEVEL: Primary | ICD-10-CM

## 2024-01-03 NOTE — TELEPHONE ENCOUNTER
Spoke with pt. Pt has an appt on 1/10 and needs to know if  wants labs and if so can he have it done at [facility]. Informed pt that I will forward the message to Dr. Ruiz and if he's ok with that the pt will need to come in and get the req and bring it to [facility] to get his lab done. Pt verbalized understanding.

## 2024-01-05 ENCOUNTER — TELEPHONE (OUTPATIENT)
Dept: UROLOGY | Facility: CLINIC | Age: 64
End: 2024-01-05
Payer: COMMERCIAL

## 2024-01-05 NOTE — TELEPHONE ENCOUNTER
Spoke with pt informed pt that I will forward the message to Dr. Ruiz pt verbalized understanding.

## 2024-01-09 ENCOUNTER — OFFICE VISIT (OUTPATIENT)
Dept: DERMATOLOGY | Facility: CLINIC | Age: 64
End: 2024-01-09
Payer: COMMERCIAL

## 2024-01-09 DIAGNOSIS — D22.9 MULTIPLE BENIGN NEVI: ICD-10-CM

## 2024-01-09 DIAGNOSIS — L57.8 ACTINIC SKIN DAMAGE: Primary | ICD-10-CM

## 2024-01-09 DIAGNOSIS — L82.1 SEBORRHEIC KERATOSES: ICD-10-CM

## 2024-01-09 DIAGNOSIS — L57.0 ACTINIC KERATOSIS: ICD-10-CM

## 2024-01-09 PROCEDURE — 17003 DESTRUCT PREMALG LES 2-14: CPT | Mod: S$GLB,,, | Performed by: STUDENT IN AN ORGANIZED HEALTH CARE EDUCATION/TRAINING PROGRAM

## 2024-01-09 PROCEDURE — 99213 OFFICE O/P EST LOW 20 MIN: CPT | Mod: 25,S$GLB,, | Performed by: STUDENT IN AN ORGANIZED HEALTH CARE EDUCATION/TRAINING PROGRAM

## 2024-01-09 PROCEDURE — 1160F RVW MEDS BY RX/DR IN RCRD: CPT | Mod: CPTII,S$GLB,, | Performed by: STUDENT IN AN ORGANIZED HEALTH CARE EDUCATION/TRAINING PROGRAM

## 2024-01-09 PROCEDURE — 1159F MED LIST DOCD IN RCRD: CPT | Mod: CPTII,S$GLB,, | Performed by: STUDENT IN AN ORGANIZED HEALTH CARE EDUCATION/TRAINING PROGRAM

## 2024-01-09 PROCEDURE — 17000 DESTRUCT PREMALG LESION: CPT | Mod: S$GLB,,, | Performed by: STUDENT IN AN ORGANIZED HEALTH CARE EDUCATION/TRAINING PROGRAM

## 2024-01-09 NOTE — PROGRESS NOTES
Subjective:      Patient ID:  Edwin Conklin is a 63 y.o. male who presents for   Chief Complaint   Patient presents with    Skin Check     UBSC     LOV 08/02/2022 ([place])    Patient is coming in today for an UBSC.  Patient states he has some spot on his head, left hand and right arm that he is concerned with.    Derm Hx  Denies Phx NMSC  Denies Fhx MM      Review of Systems   Constitutional:  Negative for fever, chills and fatigue.   Respiratory:  Negative for cough and shortness of breath.    Gastrointestinal:  Negative for nausea, vomiting and diarrhea.   Skin:  Positive for wears hat. Negative for activity-related sunscreen use.   Hematologic/Lymphatic: Does not bruise/bleed easily.       Objective:   Physical Exam   Constitutional: He appears well-developed and well-nourished. No distress.   Neurological: He is alert and oriented to person, place, and time. He is not disoriented.   Psychiatric: He has a normal mood and affect.   Skin:   Areas Examined (abnormalities noted in diagram):   Scalp / Hair Palpated and Inspected  Head / Face Inspection Performed  Neck Inspection Performed  Chest / Axilla Inspection Performed  Abdomen Inspection Performed  Back Inspection Performed  RUE Inspected  LUE Inspection Performed  Nails and Digits Inspection Performed                 Diagram Legend     Erythematous scaling macule/papule c/w actinic keratosis       Vascular papule c/w angioma      Pigmented verrucoid papule/plaque c/w seborrheic keratosis      Yellow umbilicated papule c/w sebaceous hyperplasia      Irregularly shaped tan macule c/w lentigo     1-2 mm smooth white papules consistent with Milia      Movable subcutaneous cyst with punctum c/w epidermal inclusion cyst      Subcutaneous movable cyst c/w pilar cyst      Firm pink to brown papule c/w dermatofibroma      Pedunculated fleshy papule(s) c/w skin tag(s)      Evenly pigmented macule c/w junctional nevus     Mildly variegated pigmented, slightly  irregular-bordered macule c/w mildly atypical nevus      Flesh colored to evenly pigmented papule c/w intradermal nevus       Pink pearly papule/plaque c/w basal cell carcinoma      Erythematous hyperkeratotic cursted plaque c/w SCC      Surgical scar with no sign of skin cancer recurrence      Open and closed comedones      Inflammatory papules and pustules      Verrucoid papule consistent consistent with wart     Erythematous eczematous patches and plaques     Dystrophic onycholytic nail with subungual debris c/w onychomycosis     Umbilicated papule    Erythematous-base heme-crusted tan verrucoid plaque consistent with inflamed seborrheic keratosis     Erythematous Silvery Scaling Plaque c/w Psoriasis     See annotation      Assessment / Plan:        Actinic skin damage  Upper body skin examination performed today including at least 9 points as noted in physical examination. Suspicious lesions noted.  Patient instructed in importance in daily broad spectrum sun protection of at least spf 30. Mineral sunscreen ingredients preferred (Zinc +/- Titanium) and can be found OTC.   Patient encouraged to wear hat for all outdoor exposure.   Also discussed sun avoidance and use of protective clothing.  Discussed efudex for scalp in the future    Actinic keratosis  Cryosurgery Procedure Note    Verbal consent from the patient is obtained and the patient is aware of the precancerous quality and need for treatment of these lesions. Liquid nitrogen cryosurgery is applied to the 13 actinic keratoses, as detailed in the physical exam, to produce a freeze injury. The patient is aware that blisters may form and is instructed on wound care with gentle cleansing and use of vaseline ointment to keep moist until healed. The patient is supplied a handout on cryosurgery and is instructed to call if lesions do not completely resolve.    Seborrheic keratoses  These are benign inherited growths without a malignant potential. Reassurance  given to patient. No treatment is necessary.     Multiple benign nevi  Careful dermoscopy evaluation of nevi performed with none identified as needing biopsy today  Monitor for new mole or moles that are becoming bigger, darker, irritated, or developing irregular borders.          1 year    No follow-ups on file.

## 2024-01-09 NOTE — PATIENT INSTRUCTIONS

## 2024-01-10 ENCOUNTER — OFFICE VISIT (OUTPATIENT)
Dept: UROLOGY | Facility: CLINIC | Age: 64
End: 2024-01-10
Payer: COMMERCIAL

## 2024-01-10 VITALS — WEIGHT: 212 LBS | HEIGHT: 73 IN | BODY MASS INDEX: 28.1 KG/M2

## 2024-01-10 DIAGNOSIS — R79.89 LOW SERUM TESTOSTERONE LEVEL: Primary | ICD-10-CM

## 2024-01-10 DIAGNOSIS — N52.8 OTHER MALE ERECTILE DYSFUNCTION: ICD-10-CM

## 2024-01-10 DIAGNOSIS — R39.9 LOWER URINARY TRACT SYMPTOMS (LUTS): ICD-10-CM

## 2024-01-10 LAB
BASOPHILS # BLD AUTO: 58 CELLS/UL (ref 0–200)
BASOPHILS NFR BLD AUTO: 1.2 %
EOSINOPHIL # BLD AUTO: 158 CELLS/UL (ref 15–500)
EOSINOPHIL NFR BLD AUTO: 3.3 %
ERYTHROCYTE [DISTWIDTH] IN BLOOD BY AUTOMATED COUNT: 14.8 % (ref 11–15)
HCT VFR BLD AUTO: 52.6 % (ref 38.5–50)
HGB BLD-MCNC: 16.6 G/DL (ref 13.2–17.1)
LYMPHOCYTES # BLD AUTO: 1459 CELLS/UL (ref 850–3900)
LYMPHOCYTES NFR BLD AUTO: 30.4 %
MCH RBC QN AUTO: 26.9 PG (ref 27–33)
MCHC RBC AUTO-ENTMCNC: 31.6 G/DL (ref 32–36)
MCV RBC AUTO: 85.3 FL (ref 80–100)
MONOCYTES # BLD AUTO: 648 CELLS/UL (ref 200–950)
MONOCYTES NFR BLD AUTO: 13.5 %
NEUTROPHILS # BLD AUTO: 2477 CELLS/UL (ref 1500–7800)
NEUTROPHILS NFR BLD AUTO: 51.6 %
PLATELET # BLD AUTO: 247 THOUSAND/UL (ref 140–400)
PMV BLD REES-ECKER: 9.1 FL (ref 7.5–12.5)
PSA FREE MFR SERPL: 100 % (CALC)
PSA FREE SERPL-MCNC: 0.1 NG/ML
PSA SERPL-MCNC: 0.1 NG/ML
RBC # BLD AUTO: 6.17 MILLION/UL (ref 4.2–5.8)
TESTOST SERPL-MCNC: 157 NG/DL (ref 250–827)
WBC # BLD AUTO: 4.8 THOUSAND/UL (ref 3.8–10.8)

## 2024-01-10 PROCEDURE — 1160F RVW MEDS BY RX/DR IN RCRD: CPT | Mod: CPTII,S$GLB,, | Performed by: UROLOGY

## 2024-01-10 PROCEDURE — 1159F MED LIST DOCD IN RCRD: CPT | Mod: CPTII,S$GLB,, | Performed by: UROLOGY

## 2024-01-10 PROCEDURE — 99999 PR PBB SHADOW E&M-EST. PATIENT-LVL III: CPT | Mod: PBBFAC,,, | Performed by: UROLOGY

## 2024-01-10 PROCEDURE — 3008F BODY MASS INDEX DOCD: CPT | Mod: CPTII,S$GLB,, | Performed by: UROLOGY

## 2024-01-10 PROCEDURE — 99214 OFFICE O/P EST MOD 30 MIN: CPT | Mod: S$GLB,,, | Performed by: UROLOGY

## 2024-01-10 RX ORDER — SILDENAFIL 50 MG/1
50 TABLET, FILM COATED ORAL DAILY PRN
Qty: 15 TABLET | Refills: 11 | Status: SHIPPED | OUTPATIENT
Start: 2024-01-10 | End: 2025-01-09

## 2024-01-10 RX ORDER — TESTOSTERONE CYPIONATE 200 MG/ML
INJECTION, SOLUTION INTRAMUSCULAR
Qty: 5 ML | Refills: 1 | Status: SHIPPED | OUTPATIENT
Start: 2024-01-10

## 2024-01-10 NOTE — PROGRESS NOTES
[facility] Established Patient/H&P:    Edwin Conklin is a 63 y.o. male who presents for follow up for low testosterone and lower urinary tract symptoms.     Patient has been managed by Dr. Jason Hernandez for low testosterone with injections of 100 mg every 2 weeks. States that he has been managed well with this prescription.      He reports moderate lower urinary tract symptoms - frequency, intermittency and nocturia x 2 that is not bothersome. States he drinks fluid up until bed time.     Works as a .         Interval History     10/13/21: Testerone increased to 200 mg every 2 weeks. His testosterone has improved to 469 in 10/2021 from 198 after increasing his dosage. States his fatigue, lethargy and erectile dysfunction has improved.      10/5/22: He remains on Testosterone 200 mg every 2 weeks. His levels have increased to 681 most recently. Fatigue, lethargy and ED have continued to improve. Does not require any medication for his ED. Lower urinary tract symptoms stable. Of note, he has been giving blood every 3 months per his PCP for elevated hematocrit.     1/10/24: Patient remains on Testosterone 200 mg every 2 weeks. Remains with good results. However, he has developed some worsening of his erectile dysfunction. Reports decreased rigidity and early detumescence. Has never tried any medication. Testosterone level 157, but he had been of his injections for 3 weeks.      Denies any gross hematuria, fever, chills, nephrolithiasis,  trauma or  malignancy.         PSA  0.36      9/19/22  0.15                 10/13/21  0.16                 10/11/19     Testosterone  157  1/10/24  681                  9/19/22  469                  10/13/21  198                  3/23/21  496                  3/13/20  130                  1/16/20  295                  11/21/14     IPSS    QoL  7          1          10/5/22  14        1          3/19/21       Past Medical History:   Diagnosis  "Date    Allergy     BPH (benign prostatic hypertrophy)     Low serum testosterone level     Seminal vesiculitis     Testosterone deficiency     Viral hepatitis A without mention of hepatic coma        Past Surgical History:   Procedure Laterality Date    CATARACT EXTRACTION W/  INTRAOCULAR LENS IMPLANT Right 03/09/2023    EYE SURGERY Bilateral     Lasik    KNEE ARTHROSCOPY Left 05/27/2021    partial MM     ROTATOR CUFF REPAIR  02/2013    left shoulder  per Dr. Jigar Greene    VASECTOMY         Review of patient's allergies indicates:  No Known Allergies    Medications Reviewed: see MAR    FOCUSED PHYSICAL EXAM:    There were no vitals filed for this visit.  Body mass index is 27.97 kg/m². Weight: 96.2 kg (212 lb) Height: 6' 1" (185.4 cm)       General: Alert, cooperative, no distress, appears stated age  Abdomen: Soft, non-tender, no CVA tenderness, non-distended        LABS:    Recent Results (from the past 336 hour(s))   CBC Auto Differential    Collection Time: 01/09/24  7:36 AM   Result Value Ref Range    WBC 4.8 3.8 - 10.8 Thousand/uL    RBC 6.17 (H) 4.20 - 5.80 Million/uL    Hemoglobin 16.6 13.2 - 17.1 g/dL    Hematocrit 52.6 (H) 38.5 - 50.0 %    MCV 85.3 80.0 - 100.0 fL    MCH 26.9 (L) 27.0 - 33.0 pg    MCHC 31.6 (L) 32.0 - 36.0 g/dL    RDW 14.8 11.0 - 15.0 %    Platelets 247 140 - 400 Thousand/uL    MPV 9.1 7.5 - 12.5 fL    Neutrophils, Abs 2,477 1,500 - 7,800 cells/uL    Lymph # 1,459 850 - 3,900 cells/uL    Mono # 648 200 - 950 cells/uL    Eos # 158 15 - 500 cells/uL    Baso # 58 0 - 200 cells/uL    Neutrophils Relative 51.6 %    Lymph % 30.4 %    Mono % 13.5 %    Eosinophil % 3.3 %    Basophil % 1.2 %   Testosterone, Total, Males    Collection Time: 01/09/24  8:26 AM   Result Value Ref Range    TESTOSTERONE, TOTAL, MALE 157 (L) 250 - 827 ng/dL   PSA, Total and Free    Collection Time: 01/09/24  8:26 AM   Result Value Ref Range    Total PSA      Free PSA      % Free PSA   "           Assessment/Diagnosis:    1. Low serum testosterone level  testosterone cypionate (DEPOTESTOTERONE CYPIONATE) 200 mg/mL injection      2. Lower urinary tract symptoms (LUTS)        3. Other male erectile dysfunction  sildenafiL (VIAGRA) 50 MG tablet          Plans:    - I spent 30 minutes of the day of this encounter preparing for, treating and managing this patient. Discussed the risks and benefits of testosterone replacement today.  We went over different testosterone treatments. This included possible cardiac risks.  Patient wants to continue with injections.   - Continue Testosterone 200 mg IM injections every 2 weeks. Refills ordered.   - RX for Viagra 50 mg as needed.   - RTC in 1 year with CBC, testosterone and PSA.

## 2024-07-16 ENCOUNTER — TELEPHONE (OUTPATIENT)
Dept: UROLOGY | Facility: CLINIC | Age: 64
End: 2024-07-16
Payer: COMMERCIAL

## 2024-07-16 DIAGNOSIS — R79.89 LOW SERUM TESTOSTERONE LEVEL: Primary | ICD-10-CM

## 2024-07-16 RX ORDER — TESTOSTERONE CYPIONATE 200 MG/ML
INJECTION, SOLUTION INTRAMUSCULAR
Qty: 5 ML | Refills: 1 | Status: SHIPPED | OUTPATIENT
Start: 2024-07-16

## 2024-07-16 NOTE — TELEPHONE ENCOUNTER
----- Message from Lin Frances sent at 7/15/2024 10:11 AM CDT -----  Type: Needs Medical Advice  Who Called:  pt     Best Call Back Number: [phone] (home)     Additional Information: pt requesting call back In regards to his medication please advise

## 2024-07-16 NOTE — TELEPHONE ENCOUNTER
Spoke with pt. Pt stated that he need testo injections refilled. Informed pt that I will route the message to Dr. Ruiz. Pt verbalized understanding.

## 2024-07-30 ENCOUNTER — OFFICE VISIT (OUTPATIENT)
Dept: DERMATOLOGY | Facility: CLINIC | Age: 64
End: 2024-07-30
Payer: COMMERCIAL

## 2024-07-30 VITALS — HEIGHT: 73 IN | BODY MASS INDEX: 28.11 KG/M2 | WEIGHT: 212.06 LBS

## 2024-07-30 DIAGNOSIS — L81.4 SOLAR LENTIGO: ICD-10-CM

## 2024-07-30 DIAGNOSIS — L82.1 SEBORRHEIC KERATOSES: ICD-10-CM

## 2024-07-30 DIAGNOSIS — D18.01 CHERRY ANGIOMA: ICD-10-CM

## 2024-07-30 DIAGNOSIS — D22.9 MULTIPLE BENIGN NEVI: ICD-10-CM

## 2024-07-30 DIAGNOSIS — L57.0 ACTINIC KERATOSES: Primary | ICD-10-CM

## 2024-07-30 PROCEDURE — 99213 OFFICE O/P EST LOW 20 MIN: CPT | Mod: 25,S$GLB,, | Performed by: DERMATOLOGY

## 2024-07-30 PROCEDURE — 17000 DESTRUCT PREMALG LESION: CPT | Mod: S$GLB,,, | Performed by: DERMATOLOGY

## 2024-07-30 PROCEDURE — 17003 DESTRUCT PREMALG LES 2-14: CPT | Mod: S$GLB,,, | Performed by: DERMATOLOGY

## 2024-07-30 PROCEDURE — 3008F BODY MASS INDEX DOCD: CPT | Mod: CPTII,S$GLB,, | Performed by: DERMATOLOGY

## 2024-07-30 PROCEDURE — 1159F MED LIST DOCD IN RCRD: CPT | Mod: CPTII,S$GLB,, | Performed by: DERMATOLOGY

## 2024-07-30 PROCEDURE — 3288F FALL RISK ASSESSMENT DOCD: CPT | Mod: CPTII,S$GLB,, | Performed by: DERMATOLOGY

## 2024-07-30 PROCEDURE — 1160F RVW MEDS BY RX/DR IN RCRD: CPT | Mod: CPTII,S$GLB,, | Performed by: DERMATOLOGY

## 2024-07-30 PROCEDURE — 1101F PT FALLS ASSESS-DOCD LE1/YR: CPT | Mod: CPTII,S$GLB,, | Performed by: DERMATOLOGY

## 2024-07-30 NOTE — PATIENT INSTRUCTIONS

## 2024-07-30 NOTE — PROGRESS NOTES
Subjective:      Patient ID:  Edwin Conklin is a 64 y.o. male who presents for   Chief Complaint   Patient presents with    Skin Check     UBSE     LOV: 1/9/24 ([place]) actinic skin damage, AK, SK, nevi    Patient here for UBSE    C/o spot on chest  No symptoms, just bumps    Derm Hx  Denies Phx NMSC  Denies Fhx MM      Current Outpatient Medications:   ·  acetaminophen-codeine 300-30mg (TYLENOL #3) 300-30 mg Tab, Take 1-2 tablets by mouth nightly as needed., Disp: , Rfl:   ·  clobetasoL (TEMOVATE) 0.05 % cream, Apply topically 2 (two) times daily., Disp: 45 g, Rfl: 2  ·  diclofenac (VOLTAREN) 75 MG EC tablet, Take 1 tablet (75 mg total) by mouth 2 (two) times daily with meals., Disp: 60 tablet, Rfl: 3  ·  nystatin (MYCOSTATIN) cream, Apply topically 2 (two) times daily. For  groin rash, Disp: 30 g, Rfl: 3  ·  sildenafiL (VIAGRA) 50 MG tablet, Take 1 tablet (50 mg total) by mouth daily as needed for Erectile Dysfunction. Take on an empty stomach., Disp: 15 tablet, Rfl: 11  ·  testosterone cypionate (DEPOTESTOTERONE CYPIONATE) 200 mg/mL injection, ADMINISTER 1 ML(200 MG) IN THE MUSCLE EVERY 14 DAYS, Disp: 5 mL, Rfl: 1        Review of Systems   Constitutional:  Negative for fever, chills and fatigue.   Respiratory:  Negative for cough and shortness of breath.    Gastrointestinal:  Negative for nausea, vomiting and diarrhea.   Skin:  Positive for wears hat. Negative for itching, rash, dry skin, daily sunscreen use and activity-related sunscreen use.   Hematologic/Lymphatic: Does not bruise/bleed easily.       Objective:   Physical Exam   Constitutional: He appears well-developed and well-nourished. No distress.   Neurological: He is alert and oriented to person, place, and time. He is not disoriented.   Psychiatric: He has a normal mood and affect.   Skin:   Areas Examined (abnormalities noted in diagram):   Scalp / Hair Palpated and Inspected  Head / Face Inspection Performed  Neck Inspection Performed  Chest /  Axilla Inspection Performed  Abdomen Inspection Performed  Back Inspection Performed  RUE Inspected  LUE Inspection Performed  Nails and Digits Inspection Performed                     Diagram Legend     Erythematous scaling macule/papule c/w actinic keratosis       Vascular papule c/w angioma      Pigmented verrucoid papule/plaque c/w seborrheic keratosis      Yellow umbilicated papule c/w sebaceous hyperplasia      Irregularly shaped tan macule c/w lentigo     1-2 mm smooth white papules consistent with Milia      Movable subcutaneous cyst with punctum c/w epidermal inclusion cyst      Subcutaneous movable cyst c/w pilar cyst      Firm pink to brown papule c/w dermatofibroma      Pedunculated fleshy papule(s) c/w skin tag(s)      Evenly pigmented macule c/w junctional nevus     Mildly variegated pigmented, slightly irregular-bordered macule c/w mildly atypical nevus      Flesh colored to evenly pigmented papule c/w intradermal nevus       Pink pearly papule/plaque c/w basal cell carcinoma      Erythematous hyperkeratotic cursted plaque c/w SCC      Surgical scar with no sign of skin cancer recurrence      Open and closed comedones      Inflammatory papules and pustules      Verrucoid papule consistent consistent with wart     Erythematous eczematous patches and plaques     Dystrophic onycholytic nail with subungual debris c/w onychomycosis     Umbilicated papule    Erythematous-base heme-crusted tan verrucoid plaque consistent with inflamed seborrheic keratosis     Erythematous Silvery Scaling Plaque c/w Psoriasis     See annotation      Assessment / Plan:        Actinic keratoses  Cryosurgery Procedure Note    Verbal consent from the patient is obtained and the patient is aware of the precancerous quality and need for treatment of these lesions. Liquid nitrogen cryosurgery is applied to the 3 actinic keratoses, as detailed in the physical exam, to produce a freeze injury. The patient is aware that blisters may  form and is instructed on wound care with gentle cleansing and use of vaseline ointment to keep moist until healed. The patient is supplied a handout on cryosurgery and is instructed to call if lesions do not completely resolve.    Seborrheic keratoses  These are benign inherited growths without a malignant potential. Reassurance given to patient. No treatment is necessary.     Solar lentigo  This is a benign hyperpigmented sun induced lesion. Daily sun protection will reduce the number of new lesions. Treatment of these benign lesions are considered cosmetic.    Multiple benign nevi  Careful dermoscopy evaluation of nevi performed with none identified as needing biopsy today  Monitor for new mole or moles that are becoming bigger, darker, irritated, or developing irregular borders.     Cherry angioma  This is a benign vascular lesion. Reassurance given. No treatment required.     Patient instructed in importance in daily broad spectrum sun protection of at least spf 30. Mineral sunscreen ingredients preferred (Zinc +/- Titanium) and can be found OTC.   Patient encouraged to wear hat for all outdoor exposure.   Also discussed sun avoidance and use of protective clothing.             Follow up in about 1 year (around 7/30/2025), or if symptoms worsen or fail to improve.

## 2024-08-19 ENCOUNTER — TELEPHONE (OUTPATIENT)
Dept: FAMILY MEDICINE | Facility: CLINIC | Age: 64
End: 2024-08-19
Payer: COMMERCIAL

## 2024-08-19 NOTE — TELEPHONE ENCOUNTER
----- Message from Kat Delgado sent at 8/19/2024 10:02 AM CDT -----  The patient went to [facility] on Friday. He needs a f/u pt's # [phone] GH

## 2024-08-19 NOTE — TELEPHONE ENCOUNTER
Spoke to pt and he is schedule with Cardiology wants to see Dr. Cottrell. Offered patent several appointments this week pt refused. Pt scheduled 9/19 ,

## 2024-09-05 ENCOUNTER — TELEPHONE (OUTPATIENT)
Dept: FAMILY MEDICINE | Facility: CLINIC | Age: 64
End: 2024-09-05
Payer: COMMERCIAL

## 2024-09-05 NOTE — TELEPHONE ENCOUNTER
Was going to order previsit labs, but this is ER f/u. Will order for 12/2/24 visit as it looks like this will be his Wellness appointment

## 2024-09-19 ENCOUNTER — OFFICE VISIT (OUTPATIENT)
Dept: FAMILY MEDICINE | Facility: CLINIC | Age: 64
End: 2024-09-19
Payer: COMMERCIAL

## 2024-09-19 VITALS
DIASTOLIC BLOOD PRESSURE: 68 MMHG | SYSTOLIC BLOOD PRESSURE: 120 MMHG | BODY MASS INDEX: 29.26 KG/M2 | WEIGHT: 216 LBS | HEIGHT: 72 IN | HEART RATE: 77 BPM

## 2024-09-19 DIAGNOSIS — R79.89 LOW SERUM TESTOSTERONE LEVEL: ICD-10-CM

## 2024-09-19 DIAGNOSIS — R07.89 MUSCULOSKELETAL CHEST PAIN: Primary | ICD-10-CM

## 2024-09-19 DIAGNOSIS — D75.1 POLYCYTHEMIA: ICD-10-CM

## 2024-09-19 DIAGNOSIS — G47.33 OSA ON CPAP: ICD-10-CM

## 2024-09-19 PROCEDURE — 1159F MED LIST DOCD IN RCRD: CPT | Mod: CPTII,S$GLB,, | Performed by: FAMILY MEDICINE

## 2024-09-19 PROCEDURE — 3008F BODY MASS INDEX DOCD: CPT | Mod: CPTII,S$GLB,, | Performed by: FAMILY MEDICINE

## 2024-09-19 PROCEDURE — 3288F FALL RISK ASSESSMENT DOCD: CPT | Mod: CPTII,S$GLB,, | Performed by: FAMILY MEDICINE

## 2024-09-19 PROCEDURE — 3078F DIAST BP <80 MM HG: CPT | Mod: CPTII,S$GLB,, | Performed by: FAMILY MEDICINE

## 2024-09-19 PROCEDURE — 1101F PT FALLS ASSESS-DOCD LE1/YR: CPT | Mod: CPTII,S$GLB,, | Performed by: FAMILY MEDICINE

## 2024-09-19 PROCEDURE — 3074F SYST BP LT 130 MM HG: CPT | Mod: CPTII,S$GLB,, | Performed by: FAMILY MEDICINE

## 2024-09-19 PROCEDURE — 99213 OFFICE O/P EST LOW 20 MIN: CPT | Mod: S$GLB,,, | Performed by: FAMILY MEDICINE

## 2024-09-19 NOTE — PROGRESS NOTES
SUBJECTIVE:    Patient ID: Edwin Conklin is a 64 y.o. male.    Chief Complaint: Follow-up (ER//up, review Stress test, no bottles, abc )    64-year-old male was recently seen in the emergency room for episodes of fleeting sharp chest pains.  [facility] emergency room workup was negative for MI with negative troponins, he was referred then for an outpatient stress test which was performed yesterday.  His electronic EKG portion of the stress test was negative for ischemia.  Radiologic nuclear part is still pending    Father had CABG 4 vessel in his 70s    Patient golfs and participates in a [employer] league.  No chest pain or shortness a breath during activities.    Hypogonadism, sees Dr. Fran Ruiz urology and is on testosterone injections q.2 weeks.  Takes Viagra for erectile dysfunction.  Polycythemia secondary to testosterone injections, does phlebotomies every 3 months if hematocrit greater than 50.  Complains of nocturia 2 times a night.  Slower urine flow.  Last PSA was normal in January 2024    He denies any GERD symptoms    KATINA-compliant with CPAP at night        Follow-up  Associated symptoms include chest pain (Brief fleeting sharp chest pains). Pertinent negatives include no abdominal pain, chills, coughing, fatigue, fever, joint swelling, myalgias, nausea, numbness or vomiting.       Hospital Outpatient Visit on 09/18/2024   Component Date Value Ref Range Status    LVOT stroke volume 09/18/2024 86.07  cm3 Final    LVIDd 09/18/2024 5.23  3.5 - 6.0 cm Final    LV Systolic Volume 09/18/2024 49.27  mL Final    LVIDs 09/18/2024 3.45  2.1 - 4.0 cm Final    LV Diastolic Volume 09/18/2024 130.98  mL Final    Left Ventricular End Systolic Volu* 09/18/2024 49.27  mL Final    Left Ventricular End Diastolic Vol* 09/18/2024 130.98  mL Final    IVS 09/18/2024 0.80  0.6 - 1.1 cm Final    LVOT diameter 09/18/2024 2.16  cm Final    LVOT area 09/18/2024 3.7  cm2 Final    FS 09/18/2024 34  28 - 44 % Final    Left  Ventricle Relative Wall Thick* 09/18/2024 0.44  cm Final    Posterior Wall 09/18/2024 1.16 (A)  0.6 - 1.1 cm Final    LV mass 09/18/2024 190.83  g Final    MV Peak E Chris 09/18/2024 0.79  m/s Final    TDI LATERAL 09/18/2024 0.08  m/s Final    TDI SEPTAL 09/18/2024 0.09  m/s Final    E/E' ratio 09/18/2024 9.29  m/s Final    MV Peak A Chris 09/18/2024 0.82  m/s Final    TR Max Chris 09/18/2024 2.30  m/s Final    E/A ratio 09/18/2024 0.96   Final    E wave deceleration time 09/18/2024 144.98  msec Final    LV SEPTAL E/E' RATIO 09/18/2024 8.78  m/s Final    LV LATERAL E/E' RATIO 09/18/2024 9.88  m/s Final    LVOT peak chris 09/18/2024 1.07  m/s Final    Left Ventricular Outflow Tract Velma* 09/18/2024 0.67  cm/s Final    Left Ventricular Outflow Tract Velma* 09/18/2024 2.10  mmHg Final    RV/LV Ratio 09/18/2024 0.54  cm Final    LA size 09/18/2024 3.74  cm Final    AV mean gradient 09/18/2024 3  mmHg Final    AV peak gradient 09/18/2024 5  mmHg Final    Ao peak chris 09/18/2024 1.17  m/s Final    Ao VTI 09/18/2024 27.90  cm Final    LVOT peak VTI 09/18/2024 23.50  cm Final    AV valve area 09/18/2024 3.08  cm² Final    AV Velocity Ratio 09/18/2024 0.91   Final    AV index (prosthetic) 09/18/2024 0.84   Final    MORA by Velocity Ratio 09/18/2024 3.35  cm² Final    MV mean gradient 09/18/2024 2  mmHg Final    MV peak gradient 09/18/2024 3  mmHg Final    MV valve area by continuity eq 09/18/2024 3.44  cm2 Final    MV VTI 09/18/2024 25.0  cm Final    Triscuspid Valve Regurgitation Pea* 09/18/2024 21  mmHg Final    PV PEAK VELOCITY 09/18/2024 0.85  m/s Final    PV peak gradient 09/18/2024 3  mmHg Final    Ao root annulus 09/18/2024 2.92  cm Final    Mean e' 09/18/2024 0.09  m/s Final    RVDD 09/18/2024 2.80  cm Final    AORTIC VALVE CUSP SEPERATION 09/18/2024 2.16  cm Final    TV resting pulmonary artery pressu* 09/18/2024 29  mmHg Final    RV TB RVSP 09/18/2024 10  mmHg Final    Est. RA pres 09/18/2024 8  mmHg Final   Hospital Outpatient  Visit on 09/18/2024   Component Date Value Ref Range Status    85% Max Predicted HR 09/18/2024 132   Final    Max Predicted HR 09/18/2024 155   Final    [facility] CV CPX PATIENT IS MALE 09/18/2024 1.0   Final    [facility] CV CPX PATIENT IS FEMALE 09/18/2024 0.0   Final    HR at rest 09/18/2024 71  bpm Final    Systolic blood pressure 09/18/2024 112  mmHg Final    Diastolic blood pressure 09/18/2024 64  mmHg Final    RPP 09/18/2024 7,952   Final    Exercise duration (min) 09/18/2024 7  minutes Final    Exercise duration (sec) 09/18/2024 46  seconds Final    Peak HR 09/18/2024 143  bpm Final    Peak Systolic BP 09/18/2024 150  mmHg Final    Peak Diatolic BP 09/18/2024 80  mmHg Final    Peak RPP 09/18/2024 21,450   Final    Estimated METs 09/18/2024 9   Final    % Max HR Achieved 09/18/2024 92   Final    1 Minute Recovery HR 09/18/2024 125  bpm Final   Admission on 08/17/2024, Discharged on 08/17/2024   Component Date Value Ref Range Status    QRS Duration 08/16/2024 90  ms Final    [facility] QTC Calculation 08/16/2024 424  ms Final    WBC 08/16/2024 7.80  3.90 - 12.70 K/uL Final    RBC 08/16/2024 6.13  4.60 - 6.20 M/uL Final    Hemoglobin 08/16/2024 15.8  14.0 - 18.0 g/dL Final    Hematocrit 08/16/2024 50.0  40.0 - 54.0 % Final    MCV 08/16/2024 82  82 - 98 fL Final    MCH 08/16/2024 25.8 (L)  27.0 - 31.0 pg Final    MCHC 08/16/2024 31.6 (L)  32.0 - 36.0 g/dL Final    RDW 08/16/2024 18.6 (H)  11.5 - 14.5 % Final    Platelets 08/16/2024 179  150 - 450 K/uL Final    MPV 08/16/2024 8.8 (L)  9.2 - 12.9 fL Final    Immature Granulocytes 08/16/2024 0.3  0.0 - 0.5 % Final    Gran # (ANC) 08/16/2024 5.4  1.8 - 7.7 K/uL Final    Immature Grans (Abs) 08/16/2024 0.02  0.00 - 0.04 K/uL Final    Lymph # 08/16/2024 1.7  1.0 - 4.8 K/uL Final    Mono # 08/16/2024 0.6  0.3 - 1.0 K/uL Final    Eos # 08/16/2024 0.1  0.0 - 0.5 K/uL Final    Baso # 08/16/2024 0.05  0.00 - 0.20 K/uL Final    nRBC 08/16/2024 0  0 /100 WBC Final    Gran % 08/16/2024 69.8   38.0 - 73.0 % Final    Lymph % 2024 21.2  18.0 - 48.0 % Final    Mono % 2024 7.1  4.0 - 15.0 % Final    Eosinophil % 2024 1.0  0.0 - 8.0 % Final    Basophil % 2024 0.6  0.0 - 1.9 % Final    Differential Method 2024 Automated   Final    Sodium 2024 138  136 - 145 mmol/L Final    Potassium 2024 4.4  3.5 - 5.1 mmol/L Final    Chloride 2024 102  95 - 110 mmol/L Final    CO2 2024 26  22 - 31 mmol/L Final    Glucose 2024 89  70 - 110 mg/dL Final    BUN 2024 23 (H)  9 - 21 mg/dL Final    Creatinine 2024 1.21  0.50 - 1.40 mg/dL Final    Calcium 2024 9.0  8.4 - 10.2 mg/dL Final    Total Protein 2024 8.2  6.0 - 8.4 g/dL Final    Albumin 2024 4.6  3.5 - 5.2 g/dL Final    Total Bilirubin 2024 0.7  0.2 - 1.3 mg/dL Final    Alkaline Phosphatase 2024 55  38 - 145 U/L Final    AST 2024 30  17 - 59 U/L Final    ALT 2024 36  0 - 50 U/L Final    Anion Gap 2024 10  5 - 12 mmol/L Final    eGFR 2024 >60  >60 mL/min/1.73 m^2 Final    NT-proBNP 2024 <20  5 - 900 pg/mL Final    Troponin I 2024 <0.012  0.012 - 0.034 ng/mL Final    Troponin I 2024 <0.012  0.012 - 0.034 ng/mL Final       Past Medical History:   Diagnosis Date    Allergy     BPH (benign prostatic hypertrophy)     Low serum testosterone level     Seminal vesiculitis     Testosterone deficiency     Viral hepatitis A without mention of hepatic coma      Social History     Socioeconomic History    Marital status:      Spouse name: Theresa Lucio    Number of children: 2   Occupational History    Occupation: Salesman     Comment: [employer]   Tobacco Use    Smoking status: Former     Current packs/day: 0.00     Average packs/day: 0.3 packs/day for 1 year (0.3 ttl pk-yrs)     Types: Cigarettes     Start date: 1974     Quit date: 1975     Years since quittin.7    Smokeless tobacco: Never   Substance and Sexual  Activity    Alcohol use: Yes     Comment: 3x's week-beer or crown    Drug use: Never    Sexual activity: Yes     Partners: Female   Social History Narrative    Annamaria Daughter    Janelle Daughter     Social Determinants of Health     Financial Resource Strain: Low Risk  (7/21/2020)    Overall Financial Resource Strain (CARDIA)     Difficulty of Paying Living Expenses: Not hard at all   Food Insecurity: No Food Insecurity (7/21/2020)    Hunger Vital Sign     Worried About Running Out of Food in the Last Year: Never true     Ran Out of Food in the Last Year: Never true   Transportation Needs: No Transportation Needs (7/21/2020)    PRAPARE - Transportation     Lack of Transportation (Medical): No     Lack of Transportation (Non-Medical): No   Physical Activity: Insufficiently Active (7/21/2020)    Exercise Vital Sign     Days of Exercise per Week: 3 days     Minutes of Exercise per Session: 40 min   Stress: No Stress Concern Present (8/7/2019)    [employer] - Occupational Stress Questionnaire     Feeling of Stress : Not at all     Past Surgical History:   Procedure Laterality Date    CATARACT EXTRACTION W/  INTRAOCULAR LENS IMPLANT Right 03/09/2023    EYE SURGERY Bilateral     Lasik    KNEE ARTHROSCOPY Left 05/27/2021    partial MM     ROTATOR CUFF REPAIR  02/2013    left shoulder  per Dr. Jigar Greene    VASECTOMY       Family History   Problem Relation Name Age of Onset    Alzheimer's disease Father Manny     Stroke Father Manny     Dementia Mother Wendy     Emphysema Sister Colleen     COPD Sister Colleen         ex smoker    No Known Problems Brother Manny     Eczema Neg Hx      Lupus Neg Hx      Psoriasis Neg Hx      Melanoma Neg Hx         The 10-year CVD risk score (MORENITA'Agostino, et al., 2008) is: 16%    Values used to calculate the score:      Age: 64 years      Sex: Male      Diabetic: No      Tobacco smoker: No      Systolic Blood Pressure: 120 mmHg      Is BP treated: No      HDL  Cholesterol: 36 mg/dL      Total Cholesterol: 158 mg/dL    All of your core healthy metrics are met.      Review of patient's allergies indicates:  No Known Allergies    Current Outpatient Medications:     sildenafiL (VIAGRA) 50 MG tablet, Take 1 tablet (50 mg total) by mouth daily as needed for Erectile Dysfunction. Take on an empty stomach., Disp: 15 tablet, Rfl: 11    testosterone cypionate (DEPOTESTOTERONE CYPIONATE) 200 mg/mL injection, ADMINISTER 1 ML(200 MG) IN THE MUSCLE EVERY 14 DAYS, Disp: 5 mL, Rfl: 1    Review of Systems   Constitutional:  Negative for appetite change, chills, fatigue, fever and unexpected weight change.   HENT:  Negative for ear pain and trouble swallowing.    Eyes:  Negative for pain, discharge and visual disturbance.   Respiratory:  Negative for apnea, cough, shortness of breath and wheezing.    Cardiovascular:  Positive for chest pain (Brief fleeting sharp chest pains). Negative for leg swelling.   Gastrointestinal:  Negative for abdominal pain, blood in stool, constipation, diarrhea, nausea, vomiting and reflux.   Endocrine: Negative for cold intolerance, heat intolerance and polydipsia.   Genitourinary:  Negative for bladder incontinence, dysuria, erectile dysfunction, frequency, hematuria, testicular pain and urgency.   Musculoskeletal:  Negative for gait problem, joint swelling and myalgias.   Neurological:  Negative for dizziness, seizures and numbness.   Psychiatric/Behavioral:  Negative for agitation, behavioral problems and hallucinations. The patient is not nervous/anxious.            Objective:      Vitals:    09/19/24 0743   BP: 120/68   Pulse: 77   Weight: 98 kg (216 lb)   Height: 6' (1.829 m)     Physical Exam  Vitals and nursing note reviewed.   Constitutional:       General: He is not in acute distress.     Appearance: Normal appearance. He is well-developed. He is not toxic-appearing.   HENT:      Head: Normocephalic and atraumatic.      Right Ear: Tympanic membrane  and external ear normal.      Left Ear: Tympanic membrane and external ear normal.      Nose: Nose normal.      Mouth/Throat:      Pharynx: Oropharynx is clear. No posterior oropharyngeal erythema.   Eyes:      Pupils: Pupils are equal, round, and reactive to light.   Neck:      Thyroid: No thyromegaly.      Vascular: No carotid bruit.   Cardiovascular:      Rate and Rhythm: Normal rate and regular rhythm.      Heart sounds: Normal heart sounds. No murmur heard.     Comments: Chest wall and ribs nontender to palpation  Pulmonary:      Effort: Pulmonary effort is normal.      Breath sounds: Normal breath sounds. No wheezing or rales.   Abdominal:      General: Bowel sounds are normal. There is no distension.      Palpations: Abdomen is soft.      Tenderness: There is no abdominal tenderness.   Musculoskeletal:         General: No tenderness or deformity. Normal range of motion.      Cervical back: Normal range of motion and neck supple.      Lumbar back: Normal. No spasms.      Comments: Bends 90 degrees at  waist, shoulders and knees have full range of motion, no pitting edema to lower extremities   Lymphadenopathy:      Cervical: No cervical adenopathy.   Skin:     General: Skin is warm and dry.      Findings: No rash.   Neurological:      General: No focal deficit present.      Mental Status: He is alert and oriented to person, place, and time. Mental status is at baseline.      Cranial Nerves: No cranial nerve deficit.      Coordination: Coordination normal.   Psychiatric:         Mood and Affect: Mood normal.         Behavior: Behavior normal.         Thought Content: Thought content normal.         Judgment: Judgment normal.           Assessment:       1. Musculoskeletal chest pain    2. Polycythemia    3. Low serum testosterone level    4. KATINA on CPAP         Plan:       Musculoskeletal chest pain  Patient has a negative cardiac workup, nuclear studies are still pending for his stress test, suspect  musculoskeletal chest pain rather than cardiac source.  Polycythemia  -     TSH w/reflex to FT4; Future; Expected date: 09/19/2024  -     Lipid Panel; Future; Expected date: 09/19/2024  He is to donate blood if hematocrit greater than 50 every 3 months  Low serum testosterone level  -     TSH w/reflex to FT4; Future; Expected date: 09/19/2024  -     Lipid Panel; Future; Expected date: 09/19/2024  Follow-up with Dr. Fran Ruiz for prescriptions for testosterone and to consider tamsulosin  KATINA on CPAP  Compliant with CPAP    No follow-ups on file.        9/19/2024 Edwin Cottrell

## 2024-10-23 ENCOUNTER — PATIENT MESSAGE (OUTPATIENT)
Dept: RESEARCH | Facility: HOSPITAL | Age: 64
End: 2024-10-23
Payer: COMMERCIAL

## 2024-11-14 ENCOUNTER — TELEPHONE (OUTPATIENT)
Dept: FAMILY MEDICINE | Facility: CLINIC | Age: 64
End: 2024-11-14
Payer: COMMERCIAL

## 2024-11-26 ENCOUNTER — TELEPHONE (OUTPATIENT)
Dept: UROLOGY | Facility: CLINIC | Age: 64
End: 2024-11-26
Payer: COMMERCIAL

## 2024-11-26 DIAGNOSIS — R79.89 LOW SERUM TESTOSTERONE LEVEL: Primary | ICD-10-CM

## 2024-11-26 NOTE — TELEPHONE ENCOUNTER
----- Message from Maame sent at 11/26/2024 10:59 AM CST -----  Regarding: orders  Contact: patient  Type: Needs Medical Advice  Who Called:  patient  Symptoms (please be specific):    How long has patient had these symptoms:    Pharmacy name and phone #:    Best Call Back Number:  [phone]    Additional Information: Patient would like lab orders sent to [facility]  Thanks!

## 2024-11-26 NOTE — TELEPHONE ENCOUNTER
Spoke with pt. Pt stated that he usually get his testo checked at [facility] and he has an appt on 12/20. Informed pt that he will need to come in and get the requisition because we been having trouble sending it electronically. Pt stated that  he will be in on 11/27 to pick it up. Pt verbalized understanding.

## 2024-12-02 ENCOUNTER — OFFICE VISIT (OUTPATIENT)
Dept: FAMILY MEDICINE | Facility: CLINIC | Age: 64
End: 2024-12-02
Attending: FAMILY MEDICINE
Payer: COMMERCIAL

## 2024-12-02 VITALS
BODY MASS INDEX: 29.71 KG/M2 | DIASTOLIC BLOOD PRESSURE: 60 MMHG | WEIGHT: 219.38 LBS | OXYGEN SATURATION: 98 % | SYSTOLIC BLOOD PRESSURE: 90 MMHG | HEIGHT: 72 IN | HEART RATE: 74 BPM

## 2024-12-02 DIAGNOSIS — G47.33 OSA ON CPAP: ICD-10-CM

## 2024-12-02 DIAGNOSIS — N40.1 BPH WITH OBSTRUCTION/LOWER URINARY TRACT SYMPTOMS: ICD-10-CM

## 2024-12-02 DIAGNOSIS — R79.89 LOW SERUM TESTOSTERONE LEVEL: ICD-10-CM

## 2024-12-02 DIAGNOSIS — D75.1 POLYCYTHEMIA: Primary | ICD-10-CM

## 2024-12-02 DIAGNOSIS — N13.8 BPH WITH OBSTRUCTION/LOWER URINARY TRACT SYMPTOMS: ICD-10-CM

## 2024-12-02 DIAGNOSIS — N52.8 OTHER MALE ERECTILE DYSFUNCTION: ICD-10-CM

## 2024-12-02 PROCEDURE — 99214 OFFICE O/P EST MOD 30 MIN: CPT | Mod: S$GLB,,, | Performed by: FAMILY MEDICINE

## 2024-12-02 PROCEDURE — 3008F BODY MASS INDEX DOCD: CPT | Mod: CPTII,S$GLB,, | Performed by: FAMILY MEDICINE

## 2024-12-02 PROCEDURE — 1101F PT FALLS ASSESS-DOCD LE1/YR: CPT | Mod: CPTII,S$GLB,, | Performed by: FAMILY MEDICINE

## 2024-12-02 PROCEDURE — 1159F MED LIST DOCD IN RCRD: CPT | Mod: CPTII,S$GLB,, | Performed by: FAMILY MEDICINE

## 2024-12-02 PROCEDURE — 3078F DIAST BP <80 MM HG: CPT | Mod: CPTII,S$GLB,, | Performed by: FAMILY MEDICINE

## 2024-12-02 PROCEDURE — 3074F SYST BP LT 130 MM HG: CPT | Mod: CPTII,S$GLB,, | Performed by: FAMILY MEDICINE

## 2024-12-02 PROCEDURE — 3288F FALL RISK ASSESSMENT DOCD: CPT | Mod: CPTII,S$GLB,, | Performed by: FAMILY MEDICINE

## 2024-12-20 ENCOUNTER — OFFICE VISIT (OUTPATIENT)
Dept: UROLOGY | Facility: CLINIC | Age: 64
End: 2024-12-20
Payer: COMMERCIAL

## 2024-12-20 VITALS — BODY MASS INDEX: 29.71 KG/M2 | HEIGHT: 72 IN | WEIGHT: 219.38 LBS

## 2024-12-20 DIAGNOSIS — R79.89 LOW SERUM TESTOSTERONE LEVEL: Primary | ICD-10-CM

## 2024-12-20 DIAGNOSIS — Z12.5 SCREENING FOR PROSTATE CANCER: ICD-10-CM

## 2024-12-20 DIAGNOSIS — R39.12 WEAK URINARY STREAM: ICD-10-CM

## 2024-12-20 DIAGNOSIS — N52.8 OTHER MALE ERECTILE DYSFUNCTION: ICD-10-CM

## 2024-12-20 DIAGNOSIS — R39.9 LOWER URINARY TRACT SYMPTOMS (LUTS): ICD-10-CM

## 2024-12-20 PROCEDURE — 99999 PR PBB SHADOW E&M-EST. PATIENT-LVL III: CPT | Mod: PBBFAC,,, | Performed by: UROLOGY

## 2024-12-20 RX ORDER — TADALAFIL 20 MG/1
20 TABLET ORAL DAILY
Qty: 30 TABLET | Refills: 11 | Status: SHIPPED | OUTPATIENT
Start: 2024-12-20 | End: 2025-12-20

## 2024-12-20 RX ORDER — TAMSULOSIN HYDROCHLORIDE 0.4 MG/1
0.4 CAPSULE ORAL NIGHTLY
Qty: 30 CAPSULE | Refills: 11 | Status: SHIPPED | OUTPATIENT
Start: 2024-12-20 | End: 2025-12-20

## 2024-12-20 NOTE — PROGRESS NOTES
[facility] Established Patient/H&P:    Edwin Conklin is a 64 y.o. male who presents for follow up for low testosterone and lower urinary tract symptoms.     Patient has been managed by Dr. Jason Hernandez for low testosterone with injections of 100 mg every 2 weeks. States that he has been managed well with this prescription.      He reports moderate lower urinary tract symptoms - frequency, intermittency and nocturia x 2 that is not bothersome. States he drinks fluid up until bed time.     Works as a .         Interval History     10/13/21: Testerone increased to 200 mg every 2 weeks. His testosterone has improved to 469 in 10/2021 from 198 after increasing his dosage. States his fatigue, lethargy and erectile dysfunction has improved.      10/5/22: He remains on Testosterone 200 mg every 2 weeks. His levels have increased to 681 most recently. Fatigue, lethargy and ED have continued to improve. Does not require any medication for his ED. Lower urinary tract symptoms stable. Of note, he has been giving blood every 3 months per his PCP for elevated hematocrit.      1/10/24: Patient remains on Testosterone 200 mg every 2 weeks. Remains with good results. However, he has developed some worsening of his erectile dysfunction. Reports decreased rigidity and early detumescence. Has never tried any medication. Testosterone level 157, but he had been of his injections for 3 weeks.     12/20/24: Here today for follow up. Instructed to continue Testosterone 200 mg at last visit ever 2 weeks and prescribed Viagra 50 mg as needed. Currently using Viagra 100 mg, but he is bothered that he can't take with food. Testosterone normal at 444. CBC unremarkable. PSA in 1/2024 normal at 0.1.      He reports progressively worsening lower urinary tract symptoms - incomplete emptying, frequency, intermittency, weak stream, straining and nocturia x 3. Does not restrict his nighttime fluid intake. Not on  any medication.      Denies any gross hematuria, fever, chills, nephrolithiasis,  trauma or  malignancy.         PSA  0.1  1/9/24  0.36           9/19/22  0.15                 10/13/21  0.16                 10/11/19     Testosterone  444  12/13/24  157                  1/10/24  681                  9/19/22  469                  10/13/21  198                  3/23/21  496                  3/13/20  130                  1/16/20  295                  11/21/14     IPSS    QoL  19 6 12/20/24  7          1          10/5/22  14        1          3/19/21    Past Medical History:   Diagnosis Date    Allergy     BPH (benign prostatic hypertrophy)     Low serum testosterone level     Seminal vesiculitis     Testosterone deficiency     Viral hepatitis A without mention of hepatic coma        Past Surgical History:   Procedure Laterality Date    CATARACT EXTRACTION W/  INTRAOCULAR LENS IMPLANT Right 03/09/2023    EYE SURGERY Bilateral     Lasik    KNEE ARTHROSCOPY Left 05/27/2021    partial MM     ROTATOR CUFF REPAIR  02/2013    left shoulder  per Dr. Jigar Greene    VASECTOMY         Review of patient's allergies indicates:  No Known Allergies    Medications Reviewed: see MAR    FOCUSED PHYSICAL EXAM:    There were no vitals filed for this visit.  Body mass index is 29.75 kg/m². Weight: 99.5 kg (219 lb 5.7 oz) Height: 6' (182.9 cm)       General: Alert, cooperative, no distress, appears stated age  Abdomen: Soft, non-tender, no CVA tenderness, non-distended      LABS:    Recent Results (from the past 2 weeks)   Testosterone, Total, Males    Collection Time: 12/13/24  7:42 AM   Result Value Ref Range    TESTOSTERONE, TOTAL, MALE 444 250 - 827 ng/dL           Assessment/Diagnosis:    1. Low serum testosterone level        2. Other male erectile dysfunction  tadalafiL (CIALIS) 20 MG Tab      3. Lower urinary tract symptoms (LUTS)        4. Weak urinary stream  tamsulosin (FLOMAX) 0.4 mg Cap          Plans:    - I spent 30  minutes of the day of this encounter preparing for, treating and managing this patient. Visit today included increased complexity associated with the care of the episodic problem addressed and managing the longitudinal care of the patient due to the serious and/or complex managed problem(s) low testosterone, erectile dysfunction and weak urinary stream. Discussed the risks and benefits of testosterone replacement today.  We went over different testosterone treatments. This included possible cardiac risks.  Patient wants to continue with injections.   - Continue Testosterone 200 mg every 2 weeks.   - Discontinue Viagra.   - RX for Cialis 20 mg as needed.   - RX for Flomax 0.4 mg PO daily.   - RTC in 3 months with NP Olamide Larios with PSA, symptom score and PVR.   - RTC with me in 1 year with CBC, testosterone and PSA if doing well at NP f/u.

## 2024-12-30 ENCOUNTER — TELEPHONE (OUTPATIENT)
Dept: FAMILY MEDICINE | Facility: CLINIC | Age: 64
End: 2024-12-30
Payer: COMMERCIAL

## 2025-01-13 DIAGNOSIS — R79.89 LOW SERUM TESTOSTERONE LEVEL: ICD-10-CM

## 2025-01-14 RX ORDER — TESTOSTERONE CYPIONATE 200 MG/ML
INJECTION, SOLUTION INTRAMUSCULAR
Qty: 5 ML | Refills: 1 | Status: SHIPPED | OUTPATIENT
Start: 2025-01-14

## 2025-02-05 ENCOUNTER — TELEPHONE (OUTPATIENT)
Dept: FAMILY MEDICINE | Facility: CLINIC | Age: 65
End: 2025-02-05
Payer: COMMERCIAL

## 2025-02-05 NOTE — TELEPHONE ENCOUNTER
----- Message from Heather sent at 2025  8:02 AM CST -----  Pt dropped off a surgery clearance for eye care    He also needs order for his therapeutic blood draws every 3 months to be sent to the blood center. His orders have    [phone]

## 2025-03-24 DIAGNOSIS — Z00.00 ENCOUNTER FOR MEDICARE ANNUAL WELLNESS EXAM: ICD-10-CM

## 2025-05-05 NOTE — TELEPHONE ENCOUNTER
[facility] requesting additional code for TSH. Added Z13.29 and Z79.899 to billing jer.    Statement Selected

## 2025-06-09 ENCOUNTER — OFFICE VISIT (OUTPATIENT)
Dept: FAMILY MEDICINE | Facility: CLINIC | Age: 65
End: 2025-06-09
Payer: MEDICARE

## 2025-06-09 VITALS
HEIGHT: 72 IN | HEART RATE: 63 BPM | DIASTOLIC BLOOD PRESSURE: 59 MMHG | OXYGEN SATURATION: 97 % | SYSTOLIC BLOOD PRESSURE: 108 MMHG | BODY MASS INDEX: 29.12 KG/M2 | WEIGHT: 215 LBS

## 2025-06-09 DIAGNOSIS — D75.1 POLYCYTHEMIA: ICD-10-CM

## 2025-06-09 DIAGNOSIS — M15.0 PRIMARY OSTEOARTHRITIS INVOLVING MULTIPLE JOINTS: ICD-10-CM

## 2025-06-09 DIAGNOSIS — N40.1 BPH WITH OBSTRUCTION/LOWER URINARY TRACT SYMPTOMS: ICD-10-CM

## 2025-06-09 DIAGNOSIS — Z00.00 WELLNESS EXAMINATION: Primary | ICD-10-CM

## 2025-06-09 DIAGNOSIS — G47.33 OSA ON CPAP: ICD-10-CM

## 2025-06-09 DIAGNOSIS — N13.8 BPH WITH OBSTRUCTION/LOWER URINARY TRACT SYMPTOMS: ICD-10-CM

## 2025-06-09 DIAGNOSIS — L57.0 ACTINIC KERATOSES: ICD-10-CM

## 2025-06-09 DIAGNOSIS — N52.8 OTHER MALE ERECTILE DYSFUNCTION: ICD-10-CM

## 2025-06-09 DIAGNOSIS — L20.84 INTRINSIC ECZEMA: ICD-10-CM

## 2025-06-09 DIAGNOSIS — R79.89 LOW SERUM TESTOSTERONE LEVEL: ICD-10-CM

## 2025-06-09 PROCEDURE — 3008F BODY MASS INDEX DOCD: CPT | Mod: CPTII,S$GLB,, | Performed by: FAMILY MEDICINE

## 2025-06-09 PROCEDURE — 99397 PER PM REEVAL EST PAT 65+ YR: CPT | Mod: S$GLB,,, | Performed by: FAMILY MEDICINE

## 2025-06-09 PROCEDURE — 3074F SYST BP LT 130 MM HG: CPT | Mod: CPTII,S$GLB,, | Performed by: FAMILY MEDICINE

## 2025-06-09 PROCEDURE — 3078F DIAST BP <80 MM HG: CPT | Mod: CPTII,S$GLB,, | Performed by: FAMILY MEDICINE

## 2025-06-09 PROCEDURE — 1159F MED LIST DOCD IN RCRD: CPT | Mod: CPTII,S$GLB,, | Performed by: FAMILY MEDICINE

## 2025-06-09 PROCEDURE — 3288F FALL RISK ASSESSMENT DOCD: CPT | Mod: CPTII,S$GLB,, | Performed by: FAMILY MEDICINE

## 2025-06-09 PROCEDURE — 1101F PT FALLS ASSESS-DOCD LE1/YR: CPT | Mod: CPTII,S$GLB,, | Performed by: FAMILY MEDICINE

## 2025-06-09 NOTE — PROGRESS NOTES
SUBJECTIVE:    Patient ID: Edwin Conklin is a 64 y.o. male.    Chief Complaint: Follow-up (6 mo f/u//no med bottles//body aches and small rashes throughout body//tc)    Follow-up        History of Present Illness    CHIEF COMPLAINT:  Edwin presents today for follow up of generalized body aches and stiffness    MUSCULOSKELETAL:  He experiences morning stiffness affecting his entire body. He denies taking any pain medication, choosing to work through the discomfort. He reports increased stiffness after long car rides with difficulty walking upon exiting the vehicle. He has low back pain limiting his ability to walk long distances, which caused him to stop walking with his wife a few months ago. However, he denies pain while playing golf.    DERMATOLOGIC:  He has chronic bumps on fingers that become red with increased nocturnal itching. He uses cortisone cream for symptomatic relief and follows with Dr. Muro for dermatology care.    PAST MEDICAL HISTORY:  He has a history of shingles from 4-5 years ago. He experienced chest pain in August of last year requiring medical evaluation. All cardiac tests were normal with no recurrence of symptoms.    FAMILY HISTORY:  His father had quadruple bypass surgery and history of smoking in early life. He denies any cardiac conditions in siblings.    SOCIAL HISTORY:  He is a non-smoker and drinks beer and mixed drinks socially on weekends.    PREVENTIVE CARE:  Colonoscopy in 2021 with Dr. Roblero was normal, due for repeat in 2031.      ROS:  Constitutional: -appetite change, -chills, +fatigue, -fever, -unexpected weight change  HENT: -ear pain, -trouble swallowing  Eyes: -pain, -discharge, -visual disturbance  Respiratory: -apnea, -cough, -shortness of breath, -wheezing  Cardiovascular: -chest pain, -leg swelling  Gastrointestinal: -abdominal pain, -blood in stool, -constipation, -diarrhea, -nausea, -vomiting, -reflux  Endocrine: -cold intolerance, -heat intolerance,  -polydipsia  Genitourinary: -bladder incontinence, -dysuria, -erectile dysfunction, -frequency, -hematuria, -testicular pain, -urgency, +nocturia  Musculoskeletal: -gait problem, -joint swelling, -myalgia, +joint stiffness, +body aches, +muscle stiffness, +back pain  Neurological: -dizziness, -seizures, -numbness  Psychiatric/Behavioral: -agitation, -hallucinations, -nervous, -anxiety symptoms  Skin: +rash, +skin redness         No visits with results within 6 Month(s) from this visit.   Latest known visit with results is:   Orders Only on 11/26/2024   Component Date Value Ref Range Status    TESTOSTERONE, TOTAL, MALE 12/13/2024 444  250 - 827 ng/dL Final       Past Medical History:   Diagnosis Date    Allergy     BPH (benign prostatic hypertrophy)     Low serum testosterone level     Seminal vesiculitis     Testosterone deficiency     Viral hepatitis A without mention of hepatic coma      Social History[1]  Past Surgical History:   Procedure Laterality Date    CATARACT EXTRACTION W/  INTRAOCULAR LENS IMPLANT Right 03/09/2023    CATARACT EXTRACTION W/  INTRAOCULAR LENS IMPLANT Left 05/08/2025    EYE SURGERY Bilateral     Lasik    KNEE ARTHROSCOPY Left 05/27/2021    partial MM     ROTATOR CUFF REPAIR  02/2013    left shoulder  per Dr. Jigar Greene    VASECTOMY       Family History   Problem Relation Name Age of Onset    Alzheimer's disease Father Manny     Stroke Father Manny     Dementia Mother Wendy     Emphysema Sister Colleen     COPD Sister Colleen         ex smoker    No Known Problems Brother Manny     Eczema Neg Hx      Lupus Neg Hx      Psoriasis Neg Hx      Melanoma Neg Hx         The 10-year CVD risk score (MORENITA'Agostino, et al., 2008) is: 13.1%    Values used to calculate the score:      Age: 64 years      Sex: Male      Diabetic: No      Tobacco smoker: No      Systolic Blood Pressure: 108 mmHg      Is BP treated: No      HDL Cholesterol: 38 mg/dL      Total Cholesterol: 163 mg/dL    All of your core healthy  metrics are met.      Review of patient's allergies indicates:  No Known Allergies  Current Medications[2]    Review of Systems        Objective:      Vitals:    06/09/25 0802   BP: (!) 108/59   Pulse: 63   SpO2: 97%   Weight: 97.5 kg (215 lb)   Height: 6' (1.829 m)     Physical Exam  Vitals and nursing note reviewed.   Constitutional:       General: He is not in acute distress.     Appearance: Normal appearance. He is well-developed. He is not toxic-appearing.   HENT:      Head: Normocephalic and atraumatic.      Right Ear: Tympanic membrane and external ear normal.      Left Ear: Tympanic membrane and external ear normal.      Ears:      Comments: Fluid level seen behind left TM     Nose: Nose normal.      Mouth/Throat:      Pharynx: Oropharynx is clear. No posterior oropharyngeal erythema.   Eyes:      Pupils: Pupils are equal, round, and reactive to light.   Neck:      Thyroid: No thyromegaly.      Vascular: No carotid bruit.   Cardiovascular:      Rate and Rhythm: Normal rate and regular rhythm.      Heart sounds: Normal heart sounds. No murmur heard.  Pulmonary:      Effort: Pulmonary effort is normal.      Breath sounds: Normal breath sounds. No wheezing or rales.   Abdominal:      General: Bowel sounds are normal. There is no distension.      Palpations: Abdomen is soft.      Tenderness: There is no abdominal tenderness.   Musculoskeletal:         General: No tenderness or deformity. Normal range of motion.      Cervical back: Normal range of motion and neck supple.      Lumbar back: Normal. No spasms.      Comments: Bends 90 degrees at  waist, shoulders and knees have full range of motion, no pitting edema to lower extremities, fingers have OA changes in the D IP and PIP joints, knees are slightly crepitant   Lymphadenopathy:      Cervical: No cervical adenopathy.   Skin:     General: Skin is warm and dry.      Findings: No rash.      Comments: Dry eczema patches seen on forearm left wrist, finger    Neurological:      General: No focal deficit present.      Mental Status: He is alert and oriented to person, place, and time. Mental status is at baseline.      Cranial Nerves: No cranial nerve deficit.      Coordination: Coordination normal.      Gait: Gait normal.   Psychiatric:         Mood and Affect: Mood normal.         Behavior: Behavior normal.         Thought Content: Thought content normal.         Judgment: Judgment normal.       Physical Exam    Vitals: Blood pressure is low.             Assessment:       1. Wellness examination    2. Intrinsic eczema    3. Actinic keratoses    4. BPH with obstruction/lower urinary tract symptoms    5. Other male erectile dysfunction    6. Polycythemia    7. Low serum testosterone level    8. KATINA on CPAP    9. Primary osteoarthritis involving multiple joints         Plan:       Wellness examination  -     Urinalysis Microscopic; Future; Expected date: 06/09/2025  -     Lipid Panel; Future; Expected date: 06/09/2025  -     CBC Auto Differential; Future; Expected date: 06/09/2025  -     Comprehensive Metabolic Panel; Future; Expected date: 06/09/2025  -     TSH; Future; Expected date: 06/09/2025  Will get routine wellness lab work in six-months.  Intrinsic eczema  Patient has an anti-itch cream that is successful, offered triamcinolone cream if this gets worse  Actinic keratoses  Refer to Dr. Jacob for actinic keratoses  BPH with obstruction/lower urinary tract symptoms  Continue follow-up with Dr. Fran Ruiz, PSA yearly  Other male erectile dysfunction  Viagra working well  Polycythemia  CBC in six-months  Low serum testosterone level  Continue testosterone 200 mg q.2 weeks injection  KATINA on CPAP  Compliant with CPAP at night  Primary osteoarthritis involving multiple joints  Try Aleve or Advil as needed  Offered pneumonia vaccine and Shingrix vaccine and he declines.    Assessment & Plan    M25.59 Pain in other specified joint  M54.50 Low back pain,  unspecified  L30.8 Other specified dermatitis  Z82.49 Family history of ischemic heart disease and other diseases of the circulatory system  Z72.89 Other problems related to lifestyle  R35.1 Nocturia    JOINT PAIN:  - Monitored patient's joint pain and stiffness, which is worse in the morning but resolves with activity, and can also occur after exercise.  - Advised patient to continue current exercise routine, including bowling and golfing.    LOW BACK PAIN:  - Monitored patient's low back pain, which was more bothersome a few months ago, especially after walking long distances.  - Current status shows improvement with less discomfort.    DERMATITIS:  - Monitored chronic skin condition presenting as bumps on fingers that are sometimes erythematous and pruritic, especially at night.  - These long-standing finger lesions likely represent eczema or cutaneous allergy.  - Referred patient to Dr. Muro, dermatologist, for annual skin exam.    CARDIOVASCULAR RISK FACTORS:  - Evaluated cardiovascular risk factors, noting significant family history of father with quadruple bypass.  - Reviewed cholesterol levels from previous laboratory studies and determined no need for cholesterol medication at this time.    LIFESTYLE AND ALCOHOL CONSUMPTION:  - Assessed alcohol consumption pattern, noting beer and mixed drinks consumed socially, primarily on weekends.  - Current intake is not problematic.  - Advised patient to maintain moderate alcohol consumption, avoiding excessive daily intake.    NOCTURIA:  - Monitored urinary symptoms, specifically nocturia occurring 1-2 times per night, which is within normal limits for men of patient's age.  - Noted patient is under care of Dr. Ruiz and receiving testosterone therapy for prostate health.    PREVENTIVE CARE:  - Educated patient on availability of free pneumonia vaccine for those 65 and older.  - Informed about availability of shingles vaccine, though patient has had shingles in  the past.    FOLLOW-UP:  - Edwin to continue current exercise routine.  - Ordered full set of labs to be done in 6 months with follow-up visit at that time for annual review.         Follow up in about 6 months (around 2025), or KATINA, CPAP, OA.        This note was generated with the assistance of ambient listening technology. Verbal consent was obtained by the patient and accompanying visitor(s) for the recording of patient appointment to facilitate this note. I attest to having reviewed and edited the generated note for accuracy, though some syntax or spelling errors may persist. Please contact the author of this note for any clarification.      2025 Edwin Cottrell           [1]   Social History  Socioeconomic History    Marital status:      Spouse name: Theresa Lucio    Number of children: 2   Occupational History    Occupation: [employer]     Comment: [employer]   Tobacco Use    Smoking status: Former     Current packs/day: 0.00     Average packs/day: 0.3 packs/day for 1 year (0.3 ttl pk-yrs)     Types: Cigarettes     Start date: 1974     Quit date: 1975     Years since quittin.4    Smokeless tobacco: Never   Substance and Sexual Activity    Alcohol use: Yes     Comment: 3x's week-beer or crown    Drug use: Never    Sexual activity: Yes     Partners: Female   Social History Narrative    Annamaria Daughter    Janelle Daughter     Social Drivers of Health     Financial Resource Strain: Low Risk  (2020)    Overall Financial Resource Strain (CARDIA)     Difficulty of Paying Living Expenses: Not hard at all   Food Insecurity: No Food Insecurity (2020)    Hunger Vital Sign     Worried About Running Out of Food in the Last Year: Never true     Ran Out of Food in the Last Year: Never true   Transportation Needs: No Transportation Needs (2020)    PRAPARE - Transportation     Lack of Transportation (Medical): No     Lack of Transportation (Non-Medical): No   Physical  Activity: Insufficiently Active (7/21/2020)    Exercise Vital Sign     Days of Exercise per Week: 3 days     Minutes of Exercise per Session: 40 min   Stress: No Stress Concern Present (8/7/2019)    Citizen of [place] [employer] - Occupational Stress Questionnaire     Feeling of Stress : Not at all   [2]   Current Outpatient Medications:     tadalafiL (CIALIS) 20 MG Tab, Take 1 tablet (20 mg total) by mouth once daily., Disp: 30 tablet, Rfl: 11    tamsulosin (FLOMAX) 0.4 mg Cap, Take 1 capsule (0.4 mg total) by mouth every evening., Disp: 30 capsule, Rfl: 11    testosterone cypionate (DEPOTESTOTERONE CYPIONATE) 200 mg/mL injection, ADMINISTER 1 ML(200 MG) IN THE MUSCLE EVERY 14 DAYS, Disp: 5 mL, Rfl: 1

## 2025-07-29 DIAGNOSIS — R79.89 LOW SERUM TESTOSTERONE LEVEL: ICD-10-CM

## 2025-08-01 RX ORDER — TESTOSTERONE CYPIONATE 200 MG/ML
INJECTION, SOLUTION INTRAMUSCULAR
Qty: 5 ML | Refills: 2 | Status: SHIPPED | OUTPATIENT
Start: 2025-08-01

## 2025-08-11 ENCOUNTER — TELEPHONE (OUTPATIENT)
Dept: DERMATOLOGY | Facility: CLINIC | Age: 65
End: 2025-08-11
Payer: MEDICARE